# Patient Record
Sex: FEMALE | Race: WHITE | Employment: OTHER | URBAN - METROPOLITAN AREA
[De-identification: names, ages, dates, MRNs, and addresses within clinical notes are randomized per-mention and may not be internally consistent; named-entity substitution may affect disease eponyms.]

---

## 2016-05-19 LAB
CHOLESTEROL, TOTAL: NORMAL MG/DL
CHOLESTEROL/HDL RATIO: NORMAL
HDLC SERPL-MCNC: 57 MG/DL (ref 35–70)
LDL CHOLESTEROL CALCULATED: 67 MG/DL (ref 0–160)
TRIGL SERPL-MCNC: 103 MG/DL
VLDLC SERPL CALC-MCNC: NORMAL MG/DL

## 2017-02-27 RX ORDER — ATORVASTATIN CALCIUM 10 MG/1
10 TABLET, FILM COATED ORAL DAILY
Qty: 30 TABLET | Refills: 11 | Status: SHIPPED | OUTPATIENT
Start: 2017-02-27 | End: 2018-03-19 | Stop reason: SDUPTHER

## 2017-05-15 ENCOUNTER — OFFICE VISIT (OUTPATIENT)
Dept: CARDIOLOGY CLINIC | Age: 82
End: 2017-05-15

## 2017-05-15 VITALS
SYSTOLIC BLOOD PRESSURE: 126 MMHG | DIASTOLIC BLOOD PRESSURE: 70 MMHG | BODY MASS INDEX: 32.06 KG/M2 | WEIGHT: 169.8 LBS | HEIGHT: 61 IN | HEART RATE: 80 BPM

## 2017-05-15 DIAGNOSIS — I63.9 ACUTE CVA (CEREBROVASCULAR ACCIDENT) (HCC): ICD-10-CM

## 2017-05-15 DIAGNOSIS — E78.2 MIXED HYPERLIPIDEMIA: ICD-10-CM

## 2017-05-15 DIAGNOSIS — Z95.1 S/P CABG X 2: ICD-10-CM

## 2017-05-15 DIAGNOSIS — I10 ESSENTIAL HYPERTENSION: ICD-10-CM

## 2017-05-15 DIAGNOSIS — I71.21 ASCENDING AORTIC ANEURYSM: ICD-10-CM

## 2017-05-15 DIAGNOSIS — I25.810 CORONARY ARTERY DISEASE INVOLVING CORONARY BYPASS GRAFT OF NATIVE HEART WITHOUT ANGINA PECTORIS: Primary | ICD-10-CM

## 2017-05-15 DIAGNOSIS — I48.0 PAROXYSMAL ATRIAL FIBRILLATION (HCC): ICD-10-CM

## 2017-05-15 DIAGNOSIS — I38 VHD (VALVULAR HEART DISEASE): ICD-10-CM

## 2017-05-15 DIAGNOSIS — R47.01 EXPRESSIVE APHASIA: ICD-10-CM

## 2017-05-15 DIAGNOSIS — Z98.61 HISTORY OF PTCA: ICD-10-CM

## 2017-05-15 DIAGNOSIS — E66.09 NON MORBID OBESITY DUE TO EXCESS CALORIES: ICD-10-CM

## 2017-05-15 PROCEDURE — G8400 PT W/DXA NO RESULTS DOC: HCPCS | Performed by: INTERNAL MEDICINE

## 2017-05-15 PROCEDURE — G8598 ASA/ANTIPLAT THER USED: HCPCS | Performed by: INTERNAL MEDICINE

## 2017-05-15 PROCEDURE — G8427 DOCREV CUR MEDS BY ELIG CLIN: HCPCS | Performed by: INTERNAL MEDICINE

## 2017-05-15 PROCEDURE — 99214 OFFICE O/P EST MOD 30 MIN: CPT | Performed by: INTERNAL MEDICINE

## 2017-05-15 PROCEDURE — G8419 CALC BMI OUT NRM PARAM NOF/U: HCPCS | Performed by: INTERNAL MEDICINE

## 2017-05-15 PROCEDURE — 4040F PNEUMOC VAC/ADMIN/RCVD: CPT | Performed by: INTERNAL MEDICINE

## 2017-05-15 PROCEDURE — 1123F ACP DISCUSS/DSCN MKR DOCD: CPT | Performed by: INTERNAL MEDICINE

## 2017-05-15 PROCEDURE — 1036F TOBACCO NON-USER: CPT | Performed by: INTERNAL MEDICINE

## 2017-05-15 PROCEDURE — 1090F PRES/ABSN URINE INCON ASSESS: CPT | Performed by: INTERNAL MEDICINE

## 2017-05-26 LAB
ALBUMIN SERPL-MCNC: 3.4 G/DL
ALP BLD-CCNC: 127 U/L
ALT SERPL-CCNC: 15 U/L
ANION GAP SERPL CALCULATED.3IONS-SCNC: NORMAL MMOL/L
AST SERPL-CCNC: 17 U/L
BASOPHILS ABSOLUTE: 0.2 /ΜL
BASOPHILS RELATIVE PERCENT: 2.4 %
BILIRUB SERPL-MCNC: 0.4 MG/DL (ref 0.1–1.4)
BUN BLDV-MCNC: 19 MG/DL
CALCIUM SERPL-MCNC: 9.2 MG/DL
CHLORIDE BLD-SCNC: 99 MMOL/L
CHOLESTEROL, TOTAL: 139 MG/DL
CHOLESTEROL/HDL RATIO: NORMAL
CO2: 27 MMOL/L
CREAT SERPL-MCNC: 0.9 MG/DL
EOSINOPHILS ABSOLUTE: 0.2 /ΜL
EOSINOPHILS RELATIVE PERCENT: 3.5 %
GFR CALCULATED: NORMAL
GLUCOSE BLD-MCNC: 90 MG/DL
HCT VFR BLD CALC: 36.4 % (ref 36–46)
HDLC SERPL-MCNC: 59 MG/DL (ref 35–70)
HEMOGLOBIN: 12.2 G/DL (ref 12–16)
LDL CHOLESTEROL CALCULATED: 63 MG/DL (ref 0–160)
LYMPHOCYTES ABSOLUTE: 1.5 /ΜL
LYMPHOCYTES RELATIVE PERCENT: 26.4 %
MCH RBC QN AUTO: 31 PG
MCHC RBC AUTO-ENTMCNC: 33.6 G/DL
MCV RBC AUTO: 92.3 FL
MONOCYTES ABSOLUTE: 0.7 /ΜL
MONOCYTES RELATIVE PERCENT: 12.3 %
NEUTROPHILS ABSOLUTE: 3.2 /ΜL
NEUTROPHILS RELATIVE PERCENT: 55.4 %
PLATELET # BLD: 205 K/ΜL
PMV BLD AUTO: NORMAL FL
POTASSIUM SERPL-SCNC: 4.4 MMOL/L
RBC # BLD: 3.94 10^6/ΜL
SODIUM BLD-SCNC: 139 MMOL/L
TOTAL PROTEIN: 7.2
TRIGL SERPL-MCNC: 63 MG/DL
VLDLC SERPL CALC-MCNC: 17 MG/DL
WBC # BLD: 5.8 10^3/ML

## 2017-11-01 ENCOUNTER — OFFICE VISIT (OUTPATIENT)
Dept: CARDIOLOGY CLINIC | Age: 82
End: 2017-11-01

## 2017-11-01 VITALS
HEART RATE: 70 BPM | HEIGHT: 61 IN | WEIGHT: 171.2 LBS | BODY MASS INDEX: 32.32 KG/M2 | DIASTOLIC BLOOD PRESSURE: 66 MMHG | SYSTOLIC BLOOD PRESSURE: 118 MMHG

## 2017-11-01 DIAGNOSIS — I25.810 CORONARY ARTERY DISEASE INVOLVING CORONARY BYPASS GRAFT OF NATIVE HEART WITHOUT ANGINA PECTORIS: Primary | ICD-10-CM

## 2017-11-01 DIAGNOSIS — Z98.61 HISTORY OF PTCA: ICD-10-CM

## 2017-11-01 DIAGNOSIS — I71.21 ASCENDING AORTIC ANEURYSM: ICD-10-CM

## 2017-11-01 DIAGNOSIS — I38 VHD (VALVULAR HEART DISEASE): ICD-10-CM

## 2017-11-01 DIAGNOSIS — Z95.1 S/P CABG X 2: ICD-10-CM

## 2017-11-01 DIAGNOSIS — E66.09 NON MORBID OBESITY DUE TO EXCESS CALORIES: ICD-10-CM

## 2017-11-01 DIAGNOSIS — R47.01 EXPRESSIVE APHASIA: ICD-10-CM

## 2017-11-01 DIAGNOSIS — I10 ESSENTIAL HYPERTENSION: ICD-10-CM

## 2017-11-01 DIAGNOSIS — I48.0 PAROXYSMAL ATRIAL FIBRILLATION (HCC): ICD-10-CM

## 2017-11-01 DIAGNOSIS — I63.9 ACUTE CVA (CEREBROVASCULAR ACCIDENT) (HCC): ICD-10-CM

## 2017-11-01 DIAGNOSIS — E78.2 MIXED HYPERLIPIDEMIA: ICD-10-CM

## 2017-11-01 PROCEDURE — 1123F ACP DISCUSS/DSCN MKR DOCD: CPT | Performed by: INTERNAL MEDICINE

## 2017-11-01 PROCEDURE — G8598 ASA/ANTIPLAT THER USED: HCPCS | Performed by: INTERNAL MEDICINE

## 2017-11-01 PROCEDURE — G8427 DOCREV CUR MEDS BY ELIG CLIN: HCPCS | Performed by: INTERNAL MEDICINE

## 2017-11-01 PROCEDURE — 4040F PNEUMOC VAC/ADMIN/RCVD: CPT | Performed by: INTERNAL MEDICINE

## 2017-11-01 PROCEDURE — 99214 OFFICE O/P EST MOD 30 MIN: CPT | Performed by: INTERNAL MEDICINE

## 2017-11-01 PROCEDURE — G8417 CALC BMI ABV UP PARAM F/U: HCPCS | Performed by: INTERNAL MEDICINE

## 2017-11-01 PROCEDURE — 1036F TOBACCO NON-USER: CPT | Performed by: INTERNAL MEDICINE

## 2017-11-01 PROCEDURE — G8484 FLU IMMUNIZE NO ADMIN: HCPCS | Performed by: INTERNAL MEDICINE

## 2017-11-01 PROCEDURE — 1090F PRES/ABSN URINE INCON ASSESS: CPT | Performed by: INTERNAL MEDICINE

## 2017-11-01 PROCEDURE — G8400 PT W/DXA NO RESULTS DOC: HCPCS | Performed by: INTERNAL MEDICINE

## 2017-11-01 RX ORDER — CARVEDILOL 12.5 MG/1
12.5 TABLET ORAL 2 TIMES DAILY
Qty: 180 TABLET | Refills: 3 | Status: SHIPPED | OUTPATIENT
Start: 2017-11-01 | End: 2018-05-16 | Stop reason: SDUPTHER

## 2017-11-01 NOTE — PROGRESS NOTES
OFFICE PROGRESS NOTES      Deion Mendes is a 80 y.o. female who has    CHIEF COMPLAINT AS FOLLOWS:  CHEST PAIN: Patient denies any C/O chest pains at this time. SOB: No C/O SOB at this time. LEG EDEMA: No leg edema   PALPITATIONS: Denies any C/O Palpitations                                DIZZINESS: No C/O Dizziness                           SYNCOPE: None   OTHER:                                     HPI: Patient is here for F/U on her CAD, HTN & Dyslipidemia problems. She does not have any complaints at this time.     Omar Harris has the following history recorded in care path:  Patient Active Problem List    Diagnosis Date Noted    Unstable angina ([facility] 01/22/2016     Priority: High    [facility] 01/21/2016     Priority: Medium    Expressive aphasia 01/21/2016     Priority: Medium    Paroxysmal atrial fibrillation ([facility] 01/13/2016     Priority: Medium    S/P CABG x 2 08/03/2016     Priority: Low    Essential hypertension      Priority: Low    Non morbid obesity due to excess calories      Priority: Low    Coronary artery disease involving coronary bypass graft of native heart without angina pectoris      Priority: Low    Ascending aortic aneurysm (HCC)      Priority: Low    UTI (urinary tract infection) 01/13/2016     Priority: Low    H/O cardiovascular stress test 08/13/2013     Priority: Low    VHD (valvular heart disease)      Priority: Low    History of PTCA      Priority: Low    Hyperlipidemia      Priority: Low     Current Outpatient Prescriptions   Medication Sig Dispense Refill    apixaban (ELIQUIS) 5 MG TABS tablet Take 1 tablet by mouth 2 times daily 60 tablet 11    atorvastatin (LIPITOR) 10 MG tablet Take 1 tablet by mouth daily 30 tablet 11    carvedilol (COREG) 12.5 MG tablet Take 1 tablet by mouth 2 times daily 180 tablet 3    acetaminophen (TYLENOL) 500 MG tablet Take 500 Never Smoker    Smokeless tobacco: Never Used      Comment: reviewed 9/21/2016    Alcohol use No      Review of Systems:    Constitutional: Negative for diaphoresis and fatigue  Psychological:Negative for anxiety or depression  HENT: Negative for headaches, nasal congestion, sinus pain or vertigo  Eyes: Negative for visual disturbance. Endocrine: Negative for polydipsia/polyuria  Respiratory: Negative for shortness of breath  Cardiovascular: Negative for chest pain, dyspnea on exertion, claudication, edema, irregular heartbeat, murmur, palpitations or shortness of breath  Gastrointestinal: Negative for abdominal pain or heartburn  Genito-Urinary: Negative for urinary frequency/urgency  Musculoskeletal: Negative for muscle pain, muscular weakness, negative for pain in arm and leg or swelling in foot and leg  Neurological: Negative for dizziness, headaches, memory loss, numbness/tingling, visual changes, syncope  Dermatological: Negative for rash    Objective: There were no vitals taken for this visit. Wt Readings from Last 3 Encounters:   05/15/17 169 lb 12.8 oz (77 kg)   11/23/16 166 lb (75.3 kg)   09/21/16 169 lb (76.7 kg)     There is no height or weight on file to calculate BMI. GENERAL - Alert, oriented, pleasant, in no apparent distress. EYES: No jaundice, no conjunctival pallor. SKIN: It is warm & dry. No rashes. No Echhymosis    HEENT  No clinically significant abnormalities seen. Neck - Supple. No jugular venous distention noted. No carotid bruits. Cardiovascular  Normal S1 and S2 without obvious murmur or gallop. Extremities - No cyanosis, clubbing, or significant edema. Pulmonary  No respiratory distress. No wheezes or rales. Abdomen  No masses, tenderness, or organomegaly. Musculoskeletal  No significant edema. No joint deformities. No muscle wasting. Neurologic  Cranial nerves II through XII are grossly intact. There were no gross focal neurologic abnormalities.     Lab heart disease) I39    H/O cardiovascular stress test Z92.89    Paroxysmal atrial fibrillation (HCC) I48.0    UTI (urinary tract infection) N39.0    Acute CVA (cerebrovascular accident) ([facility] I63.9    Expressive aphasia R47.01    Unstable angina (HCC) I20.0    Coronary artery disease involving coronary bypass graft of native heart without angina pectoris I25.810    Ascending aortic aneurysm (HCC) I71.2    Essential hypertension I10    Non morbid obesity due to excess calories E66.09    S/P CABG x 2 Z95.1       Assessment & Plan:    CAD:Yes   clinically stable. Patient is on optimal medical regimen ( see medication list above )  -     CORONARY ARTERY DISEASE: Patient is currently  asymptomatic from CAD. - changes in  treatment:   no           - Testing ordered:  no  [facility] classification: 1  [place] RISK SCORE:  [place] RISK SCORE:  HTN:well controlled on current medical regimen, see list above. - changes in  treatment:   no     VHD: No significant VHD noted  DYSLIPIDEMIA: Patient's profile is at / near [employer],                                                             Tolerating current medical regimen wellyes,                                                               See most recent Lab values in Labs section above. OTHER RELEVANT DIAGNOSIS:as noted in patient's active problem list:  TESTS ORDERED:  None this visit                                      All previously ordered tests reviewed. ARRHYTHMIAS: Patient has H/O Atrial fibrillation                                She is rate controlled & on anticoagulation. MEDICATIONS: CPM   Office f/u in six months. Primary/secondary prevention is the goal by aggressive risk modification, healthy and therapeutic life style changes for cardiovascular risk reduction. Various goals are discussed and multiple questions answered.

## 2017-11-01 NOTE — LETTER
Cardiology [address] [place] [address]  Phone: [phone]  Fax: [phone]    Sharon Stiles MD        November 1, 2017     Dewayne Rodriguez MD  [address]    Patient: Keyvn Alfaro  MR Number: [id number]  YOB: 1933  Date of Visit: 11/1/2017    Dear Dr. Dewayne Rodriguez: Thank you for the request for consultation for [facility] to me for the evaluation of CAD / A-fib. Below are the relevant portions of my assessment and plan of care. If you have questions, please do not hesitate to call me. I look forward to following Corinna Ramachandran along with you.     Sincerely,        Sharon Stiles MD

## 2017-11-29 ENCOUNTER — HOSPITAL ENCOUNTER (OUTPATIENT)
Dept: WOMENS IMAGING | Age: 82
Discharge: HOME | End: 2017-11-29
Attending: FAMILY MEDICINE | Admitting: FAMILY MEDICINE

## 2017-11-29 DIAGNOSIS — Z12.31 SCREENING MAMMOGRAM, ENCOUNTER FOR: ICD-10-CM

## 2017-11-29 DIAGNOSIS — M81.0 OSTEOPOROSIS WITHOUT CURRENT PATHOLOGICAL FRACTURE, UNSPECIFIED OSTEOPOROSIS TYPE: ICD-10-CM

## 2018-03-20 RX ORDER — ATORVASTATIN CALCIUM 10 MG/1
10 TABLET, FILM COATED ORAL DAILY
Qty: 30 TABLET | Refills: 1 | Status: SHIPPED | OUTPATIENT
Start: 2018-03-20 | End: 2018-05-16 | Stop reason: SDUPTHER

## 2018-03-21 LAB
ALBUMIN SERPL-MCNC: 4 G/DL
ALP BLD-CCNC: 111 U/L
ALT SERPL-CCNC: 13 U/L
ANION GAP SERPL CALCULATED.3IONS-SCNC: NORMAL MMOL/L
AST SERPL-CCNC: 18 U/L
BILIRUB SERPL-MCNC: 0.3 MG/DL (ref 0.1–1.4)
BUN BLDV-MCNC: 20 MG/DL
CALCIUM SERPL-MCNC: 10 MG/DL
CHLORIDE BLD-SCNC: 99 MMOL/L
CHOLESTEROL, TOTAL: 153 MG/DL
CHOLESTEROL/HDL RATIO: ABNORMAL
CO2: 28 MMOL/L
CREAT SERPL-MCNC: 1 MG/DL
GFR CALCULATED: NORMAL
GLUCOSE BLD-MCNC: 76 MG/DL
HDLC SERPL-MCNC: 71 MG/DL (ref 35–70)
LDL CHOLESTEROL CALCULATED: 65 MG/DL (ref 0–160)
POTASSIUM SERPL-SCNC: 4.3 MMOL/L
SODIUM BLD-SCNC: 140 MMOL/L
TOTAL PROTEIN: 7.3
TRIGL SERPL-MCNC: 87 MG/DL
TSH SERPL DL<=0.05 MIU/L-ACNC: 2.48 UIU/ML
VLDLC SERPL CALC-MCNC: 17 MG/DL

## 2018-05-16 ENCOUNTER — OFFICE VISIT (OUTPATIENT)
Dept: CARDIOLOGY CLINIC | Age: 83
End: 2018-05-16

## 2018-05-16 VITALS
WEIGHT: 171.8 LBS | DIASTOLIC BLOOD PRESSURE: 70 MMHG | SYSTOLIC BLOOD PRESSURE: 130 MMHG | HEIGHT: 60 IN | BODY MASS INDEX: 33.73 KG/M2 | HEART RATE: 60 BPM

## 2018-05-16 DIAGNOSIS — I48.0 PAROXYSMAL ATRIAL FIBRILLATION (HCC): ICD-10-CM

## 2018-05-16 DIAGNOSIS — E78.2 MIXED HYPERLIPIDEMIA: ICD-10-CM

## 2018-05-16 DIAGNOSIS — I71.21 ASCENDING AORTIC ANEURYSM: ICD-10-CM

## 2018-05-16 DIAGNOSIS — E66.09 NON MORBID OBESITY DUE TO EXCESS CALORIES: ICD-10-CM

## 2018-05-16 DIAGNOSIS — I63.9 ACUTE CVA (CEREBROVASCULAR ACCIDENT) (HCC): ICD-10-CM

## 2018-05-16 DIAGNOSIS — R47.01 EXPRESSIVE APHASIA: ICD-10-CM

## 2018-05-16 DIAGNOSIS — Z95.1 S/P CABG X 2: ICD-10-CM

## 2018-05-16 DIAGNOSIS — I10 ESSENTIAL HYPERTENSION: ICD-10-CM

## 2018-05-16 DIAGNOSIS — Z98.61 HISTORY OF PTCA: ICD-10-CM

## 2018-05-16 DIAGNOSIS — I25.810 CORONARY ARTERY DISEASE INVOLVING CORONARY BYPASS GRAFT OF NATIVE HEART WITHOUT ANGINA PECTORIS: Primary | ICD-10-CM

## 2018-05-16 DIAGNOSIS — I38 VHD (VALVULAR HEART DISEASE): ICD-10-CM

## 2018-05-16 PROCEDURE — G8427 DOCREV CUR MEDS BY ELIG CLIN: HCPCS | Performed by: INTERNAL MEDICINE

## 2018-05-16 PROCEDURE — 99214 OFFICE O/P EST MOD 30 MIN: CPT | Performed by: INTERNAL MEDICINE

## 2018-05-16 PROCEDURE — G8417 CALC BMI ABV UP PARAM F/U: HCPCS | Performed by: INTERNAL MEDICINE

## 2018-05-16 PROCEDURE — 1123F ACP DISCUSS/DSCN MKR DOCD: CPT | Performed by: INTERNAL MEDICINE

## 2018-05-16 PROCEDURE — G8598 ASA/ANTIPLAT THER USED: HCPCS | Performed by: INTERNAL MEDICINE

## 2018-05-16 PROCEDURE — 4040F PNEUMOC VAC/ADMIN/RCVD: CPT | Performed by: INTERNAL MEDICINE

## 2018-05-16 PROCEDURE — 1090F PRES/ABSN URINE INCON ASSESS: CPT | Performed by: INTERNAL MEDICINE

## 2018-05-16 PROCEDURE — G8399 PT W/DXA RESULTS DOCUMENT: HCPCS | Performed by: INTERNAL MEDICINE

## 2018-05-16 PROCEDURE — 1036F TOBACCO NON-USER: CPT | Performed by: INTERNAL MEDICINE

## 2018-05-16 RX ORDER — ATORVASTATIN CALCIUM 10 MG/1
10 TABLET, FILM COATED ORAL DAILY
Qty: 30 TABLET | Refills: 1 | Status: SHIPPED | OUTPATIENT
Start: 2018-05-16 | End: 2018-07-18 | Stop reason: SDUPTHER

## 2018-05-16 RX ORDER — LEVOTHYROXINE SODIUM 0.03 MG/1
25 TABLET ORAL
COMMUNITY
End: 2019-09-19 | Stop reason: SDUPTHER

## 2018-05-16 RX ORDER — CARVEDILOL 12.5 MG/1
12.5 TABLET ORAL 2 TIMES DAILY
Qty: 180 TABLET | Refills: 3 | Status: SHIPPED | OUTPATIENT
Start: 2018-05-16 | End: 2019-06-05 | Stop reason: DRUGHIGH

## 2018-07-19 RX ORDER — ATORVASTATIN CALCIUM 10 MG/1
10 TABLET, FILM COATED ORAL DAILY
Qty: 30 TABLET | Refills: 5 | Status: SHIPPED | OUTPATIENT
Start: 2018-07-19 | End: 2019-01-02 | Stop reason: SDUPTHER

## 2018-11-20 ENCOUNTER — OFFICE VISIT (OUTPATIENT)
Dept: CARDIOLOGY CLINIC | Age: 83
End: 2018-11-20
Payer: MEDICARE

## 2018-11-20 VITALS
HEIGHT: 60 IN | DIASTOLIC BLOOD PRESSURE: 72 MMHG | SYSTOLIC BLOOD PRESSURE: 136 MMHG | HEART RATE: 74 BPM | BODY MASS INDEX: 32.98 KG/M2 | WEIGHT: 168 LBS

## 2018-11-20 DIAGNOSIS — I71.21 ASCENDING AORTIC ANEURYSM: ICD-10-CM

## 2018-11-20 DIAGNOSIS — I25.810 CORONARY ARTERY DISEASE INVOLVING CORONARY BYPASS GRAFT OF NATIVE HEART WITHOUT ANGINA PECTORIS: Primary | ICD-10-CM

## 2018-11-20 DIAGNOSIS — I10 ESSENTIAL HYPERTENSION: ICD-10-CM

## 2018-11-20 DIAGNOSIS — Z98.61 HISTORY OF PTCA: ICD-10-CM

## 2018-11-20 DIAGNOSIS — I48.0 PAROXYSMAL ATRIAL FIBRILLATION (HCC): ICD-10-CM

## 2018-11-20 DIAGNOSIS — I38 VHD (VALVULAR HEART DISEASE): ICD-10-CM

## 2018-11-20 DIAGNOSIS — E66.09 NON MORBID OBESITY DUE TO EXCESS CALORIES: ICD-10-CM

## 2018-11-20 DIAGNOSIS — E78.2 MIXED HYPERLIPIDEMIA: ICD-10-CM

## 2018-11-20 DIAGNOSIS — R47.01 EXPRESSIVE APHASIA: ICD-10-CM

## 2018-11-20 DIAGNOSIS — Z95.1 S/P CABG X 2: ICD-10-CM

## 2018-11-20 PROCEDURE — 1090F PRES/ABSN URINE INCON ASSESS: CPT | Performed by: INTERNAL MEDICINE

## 2018-11-20 PROCEDURE — 1036F TOBACCO NON-USER: CPT | Performed by: INTERNAL MEDICINE

## 2018-11-20 PROCEDURE — G8417 CALC BMI ABV UP PARAM F/U: HCPCS | Performed by: INTERNAL MEDICINE

## 2018-11-20 PROCEDURE — G8399 PT W/DXA RESULTS DOCUMENT: HCPCS | Performed by: INTERNAL MEDICINE

## 2018-11-20 PROCEDURE — G8484 FLU IMMUNIZE NO ADMIN: HCPCS | Performed by: INTERNAL MEDICINE

## 2018-11-20 PROCEDURE — 1123F ACP DISCUSS/DSCN MKR DOCD: CPT | Performed by: INTERNAL MEDICINE

## 2018-11-20 PROCEDURE — G8598 ASA/ANTIPLAT THER USED: HCPCS | Performed by: INTERNAL MEDICINE

## 2018-11-20 PROCEDURE — G8427 DOCREV CUR MEDS BY ELIG CLIN: HCPCS | Performed by: INTERNAL MEDICINE

## 2018-11-20 PROCEDURE — 99214 OFFICE O/P EST MOD 30 MIN: CPT | Performed by: INTERNAL MEDICINE

## 2018-11-20 PROCEDURE — 1101F PT FALLS ASSESS-DOCD LE1/YR: CPT | Performed by: INTERNAL MEDICINE

## 2018-11-20 PROCEDURE — 4040F PNEUMOC VAC/ADMIN/RCVD: CPT | Performed by: INTERNAL MEDICINE

## 2018-11-20 NOTE — PROGRESS NOTES
OFFICE PROGRESS NOTES      Yates Duane is a 80 y.o. female who has    CHIEF COMPLAINT AS FOLLOWS:  CHEST PAIN: Patient denies any C/O chest pains at this time. SOB: No C/O SOB at this time. LEG EDEMA: No leg edema   PALPITATIONS: Denies any C/O Palpitations                                   DIZZINESS: No C/O Dizziness                           SYNCOPE: None   OTHER:                                     HPI: Patient is here for F/U on her CAD, HTN & Dyslipidemia problems. She does not have any complaints at this time.     Gareth Sanabria has the following history recorded in care path:  Patient Active Problem List    Diagnosis Date Noted    Unstable angina ([facility] [facility] 01/22/2016     Priority: High    [facility] 01/21/2016     Priority: Medium    Expressive aphasia 01/21/2016     Priority: Medium    Paroxysmal atrial fibrillation ([facility] 01/13/2016     Priority: Medium    S/P CABG x 2 08/03/2016     Priority: Low    Essential hypertension      Priority: Low    Non morbid obesity due to excess calories      Priority: Low    Coronary artery disease involving coronary bypass graft of native heart without angina pectoris      Priority: Low    Ascending aortic aneurysm (HCC)      Priority: Low    H/O cardiovascular stress test 08/13/2013     Priority: Low    VHD (valvular heart disease)      Priority: Low    History of PTCA      Priority: Low    Hyperlipidemia      Priority: Low     Current Outpatient Prescriptions   Medication Sig Dispense Refill    atorvastatin (LIPITOR) 10 MG tablet Take 1 tablet by mouth daily 30 tablet 5    apixaban (ELIQUIS) 5 MG TABS tablet Take 1 tablet by mouth 2 times daily 60 tablet 5    levothyroxine (SYNTHROID) 25 MCG tablet Take 25 mcg by mouth Daily      carvedilol (COREG) 12.5 MG tablet Take 1 tablet by mouth 2 times daily (Patient taking differently: Take 12.5 mg by reviewed History reviewed. No pertinent family history. Social History   Substance Use Topics    Smoking status: Never Smoker    Smokeless tobacco: Never Used    Alcohol use No      Review of Systems:    Constitutional: Negative for diaphoresis and fatigue  Psychological:Negative for anxiety or depression  HENT: Negative for headaches, nasal congestion, sinus pain or vertigo  Eyes: Negative for visual disturbance. Endocrine: Negative for polydipsia/polyuria  Respiratory: Negative for shortness of breath  Cardiovascular: Negative for chest pain, dyspnea on exertion, claudication, edema, irregular heartbeat, murmur, palpitations or shortness of breath  Gastrointestinal: Negative for abdominal pain or heartburn  Genito-Urinary: Negative for urinary frequency/urgency  Musculoskeletal: Negative for muscle pain, muscular weakness, negative for pain in arm and leg or swelling in foot and leg  Neurological: Negative for dizziness, headaches, memory loss, numbness/tingling, visual changes, syncope  Dermatological: Negative for rash    Objective:  /72   Pulse 74   Ht 4' 11.5\" (1.511 m)   Wt 168 lb (76.2 kg)   BMI 33.36 kg/m²   Wt Readings from Last 3 Encounters:   11/20/18 168 lb (76.2 kg)   05/16/18 171 lb 12.8 oz (77.9 kg)   11/01/17 171 lb 3.2 oz (77.7 kg)     Body mass index is 33.36 kg/m². GENERAL - Alert, oriented, pleasant, in no apparent distress. EYES: No jaundice, no conjunctival pallor. SKIN: It is warm & dry. No rashes. No Echhymosis    HEENT - No clinically significant abnormalities seen. Neck - Supple. No jugular venous distention noted. No carotid bruits. Cardiovascular - Normal S1 and S2 without obvious murmur or gallop. Extremities - No cyanosis, clubbing, or significant edema. Pulmonary - No respiratory distress. No wheezes or rales. Abdomen - No masses, tenderness, or organomegaly. Musculoskeletal - No significant edema. No joint deformities. No muscle wasting.   Neurologic anticoagulation. MEDICATIONS: CPM   Office f/u in six months. Primary/secondary prevention is the goal by aggressive risk modification, healthy and therapeutic life style changes for cardiovascular risk reduction. Various goals are discussed and multiple questions answered.

## 2018-12-05 ENCOUNTER — PROCEDURE VISIT (OUTPATIENT)
Dept: CARDIOLOGY CLINIC | Age: 83
End: 2018-12-05
Payer: MEDICARE

## 2018-12-05 DIAGNOSIS — I10 ESSENTIAL HYPERTENSION: ICD-10-CM

## 2018-12-05 DIAGNOSIS — R47.01 EXPRESSIVE APHASIA: ICD-10-CM

## 2018-12-05 DIAGNOSIS — I25.810 CORONARY ARTERY DISEASE INVOLVING CORONARY BYPASS GRAFT OF NATIVE HEART WITHOUT ANGINA PECTORIS: ICD-10-CM

## 2018-12-05 DIAGNOSIS — Z95.1 S/P CABG X 2: ICD-10-CM

## 2018-12-05 DIAGNOSIS — E78.2 MIXED HYPERLIPIDEMIA: ICD-10-CM

## 2018-12-05 DIAGNOSIS — I38 VHD (VALVULAR HEART DISEASE): ICD-10-CM

## 2018-12-05 DIAGNOSIS — I48.0 PAROXYSMAL ATRIAL FIBRILLATION (HCC): ICD-10-CM

## 2018-12-05 DIAGNOSIS — E66.09 NON MORBID OBESITY DUE TO EXCESS CALORIES: ICD-10-CM

## 2018-12-05 DIAGNOSIS — Z98.61 HISTORY OF PTCA: ICD-10-CM

## 2018-12-05 DIAGNOSIS — I71.21 ASCENDING AORTIC ANEURYSM: ICD-10-CM

## 2018-12-05 PROCEDURE — 93880 EXTRACRANIAL BILAT STUDY: CPT | Performed by: INTERNAL MEDICINE

## 2018-12-06 ENCOUNTER — TELEPHONE (OUTPATIENT)
Dept: CARDIOLOGY CLINIC | Age: 83
End: 2018-12-06

## 2018-12-06 NOTE — TELEPHONE ENCOUNTER
Left message on voicemail per hipaa. Mild (0-49%) disease of the Bilateral proximal Internal carotid artery. The Left Proximal external carotid artery exhibits stenosis. Normal vertebral flow.

## 2019-01-02 RX ORDER — ATORVASTATIN CALCIUM 10 MG/1
10 TABLET, FILM COATED ORAL DAILY
Qty: 30 TABLET | Refills: 5 | Status: SHIPPED | OUTPATIENT
Start: 2019-01-02 | End: 2019-07-31 | Stop reason: SDUPTHER

## 2019-01-04 ENCOUNTER — TELEPHONE (OUTPATIENT)
Dept: CARDIOLOGY CLINIC | Age: 84
End: 2019-01-04

## 2019-05-13 NOTE — TELEPHONE ENCOUNTER
Kathyleen Solon called needing the following prescription refills:   Requested Prescriptions     Pending Prescriptions Disp Refills    ADVAIR DISKUS 250-50 MCG/DOSE AEPB [Pharmacy Med Name: Ibeth Alcaraz 250-50 [place] 1 Inhaler 4     Sig: TAKE 1 PUFF BY MOUTH EVERY DAY       Medications were reviewed and appropriate refills were sent to the requested pharmacy after provider approval.     Electronically signed by Davide Ariza LPN on 5/76/80 at 82:71 AM        Patient has appt on 7/31/19

## 2019-06-05 ENCOUNTER — OFFICE VISIT (OUTPATIENT)
Dept: CARDIOLOGY CLINIC | Age: 84
End: 2019-06-05
Payer: MEDICARE

## 2019-06-05 ENCOUNTER — PROCEDURE VISIT (OUTPATIENT)
Dept: CARDIOLOGY CLINIC | Age: 84
End: 2019-06-05
Payer: MEDICARE

## 2019-06-05 VITALS
WEIGHT: 169 LBS | DIASTOLIC BLOOD PRESSURE: 70 MMHG | HEIGHT: 59 IN | HEART RATE: 60 BPM | SYSTOLIC BLOOD PRESSURE: 118 MMHG | BODY MASS INDEX: 34.07 KG/M2

## 2019-06-05 DIAGNOSIS — I71.21 ASCENDING AORTIC ANEURYSM: ICD-10-CM

## 2019-06-05 DIAGNOSIS — I25.810 CORONARY ARTERY DISEASE INVOLVING CORONARY BYPASS GRAFT OF NATIVE HEART WITHOUT ANGINA PECTORIS: ICD-10-CM

## 2019-06-05 DIAGNOSIS — I38 VHD (VALVULAR HEART DISEASE): Primary | ICD-10-CM

## 2019-06-05 DIAGNOSIS — E78.2 MIXED HYPERLIPIDEMIA: ICD-10-CM

## 2019-06-05 DIAGNOSIS — I38 VHD (VALVULAR HEART DISEASE): ICD-10-CM

## 2019-06-05 DIAGNOSIS — I10 ESSENTIAL HYPERTENSION: ICD-10-CM

## 2019-06-05 DIAGNOSIS — I48.0 PAROXYSMAL ATRIAL FIBRILLATION (HCC): Primary | ICD-10-CM

## 2019-06-05 LAB
LV EF: 58 %
LVEF MODALITY: NORMAL

## 2019-06-05 PROCEDURE — G8427 DOCREV CUR MEDS BY ELIG CLIN: HCPCS | Performed by: NURSE PRACTITIONER

## 2019-06-05 PROCEDURE — 1123F ACP DISCUSS/DSCN MKR DOCD: CPT | Performed by: NURSE PRACTITIONER

## 2019-06-05 PROCEDURE — 4040F PNEUMOC VAC/ADMIN/RCVD: CPT | Performed by: NURSE PRACTITIONER

## 2019-06-05 PROCEDURE — 1090F PRES/ABSN URINE INCON ASSESS: CPT | Performed by: NURSE PRACTITIONER

## 2019-06-05 PROCEDURE — 99213 OFFICE O/P EST LOW 20 MIN: CPT | Performed by: NURSE PRACTITIONER

## 2019-06-05 PROCEDURE — G8598 ASA/ANTIPLAT THER USED: HCPCS | Performed by: NURSE PRACTITIONER

## 2019-06-05 PROCEDURE — G8417 CALC BMI ABV UP PARAM F/U: HCPCS | Performed by: NURSE PRACTITIONER

## 2019-06-05 PROCEDURE — 1036F TOBACCO NON-USER: CPT | Performed by: NURSE PRACTITIONER

## 2019-06-05 PROCEDURE — 93306 TTE W/DOPPLER COMPLETE: CPT | Performed by: INTERNAL MEDICINE

## 2019-06-05 RX ORDER — CARVEDILOL 6.25 MG/1
6.25 TABLET ORAL 2 TIMES DAILY WITH MEALS
COMMUNITY
End: 2019-07-31 | Stop reason: SDUPTHER

## 2019-06-05 NOTE — PATIENT INSTRUCTIONS
Please remember to bring all medication bottles or a medication list with you to your appointment. If you have any questions, please call our office at [phone].

## 2019-06-05 NOTE — PROGRESS NOTES
[facility] [place]  Brianna Russell  Phone: [phone]    Fax [phone]                  Deuce Moreno MD, Paul Jha MD, [address], MD, MD Lisa Mascorro MD Mela Fairy, MD Julianne Orn, APRN      Ivette Mcdonald, APRGABO Delgado, OSIRIS    CARDIOLOGY  NOTE      6/5/2019    RE: Larry Guerrero  (4/17/1933)                               TO:  Dr. Shelley Espinoza MD  The primary cardiologist is Dr. Jose Drake    CC:   1. Shortness of breath    2. Paroxysmal atrial fibrillation (HCC)    3. VHD (valvular heart disease)    4. Coronary artery disease involving coronary bypass graft of native heart without angina pectoris    5. Ascending aortic aneurysm ([address]    6. Essential hypertension    7. Mixed hyperlipidemia      Patient denies all of the following:  Chest Pain  Palpitations  Increased Shortness of Breath  Increased Edema  Dizziness  Syncope      HPI: Thank you for involving me in taking care of your patient Larry Guerrero, who is a  80y.o. year old female with a history as listed above. Patient is not active she lives home alone but has frequent help. Patient is compliant with medications. Patient denies any cardiac complaints or needs.      Vitals:    06/05/19 1052   BP: 118/70   Pulse: 60       Current Outpatient Medications   Medication Sig Dispense Refill    carvedilol (COREG) 6.25 MG tablet Take 6.25 mg by mouth 2 times daily (with meals)      ADVAIR DISKUS 250-50 MCG/DOSE AEPB TAKE 1 PUFF BY MOUTH EVERY DAY 1 Inhaler 4    apixaban (ELIQUIS) 5 MG TABS tablet Take 1 tablet by mouth 2 times daily 56 tablet 0    atorvastatin (LIPITOR) 10 MG tablet Take 1 tablet by mouth daily 30 tablet 5    levothyroxine (SYNTHROID) 25 MCG tablet Take 25 mcg by mouth every morning (before breakfast)       acetaminophen (TYLENOL) 500 MG tablet Take 500 mg by mouth every 6 hours as needed for Pain      aspirin 81 MG tablet Take 81 mg by mouth daily      ranitidine (ZANTAC) 150 MG tablet Take 150 mg by mouth 2 times daily       multivitamin (THERAGRAN) per tablet Take 1 tablet by mouth daily.  albuterol (PROVENTIL;VENTOLIN) 90 MCG/ACT inhaler Inhale 2 puffs into the lungs every 6 hours as needed.  nitroGLYCERIN (NITROSTAT) 0.4 MG SL tablet Place 1 tablet under the tongue every 5 minutes as needed for Chest pain 25 tablet 3     No current facility-administered medications for this visit. Allergies: Sulfa antibiotics and Zoloft [sertraline hcl]  Past Medical History:   Diagnosis Date    A-fib [facility])     Ascending aortic aneurysm ([facility] [address] [address] 07/15/2016    per 7/15/2016 Op Note from Dr Zurdo Cee.  CAD (coronary artery disease)     Family history of coronary artery disease     H/O cardiovascular stress test 08/13/2013    EF 70%, no ischemia, normal perfusion pattern in all regions, normal study.  History of PTCA 1995,10/7/05,10/25/05    LAD,RCAX2 Ramus    Hx of cardiovascular stress test     11/12/2009=normal perfusion EF 70%, pt c/o 6/10 chest pain during adenosine infusion;  03/2008=EF 65%; 02/2006=EF 70%; 05/2005; 08/2001;10/1997; 09/1996    Hx of echocardiogram 8/13/13,11/12/09,05/2006,08/2001,10/1997    8/13-EF 50-55% essentially normal study.  11/09=EF>55%, mild MR    Hyperlipidemia     Hypertension     NSTEMI (non-ST elevated myocardial infarction) ([facility] 07/07/2016    Obesity     S/P CABG x 2 07/15/2016    LIMA-LAD and SVG-OM    VHD (valvular heart disease)      Past Surgical History:   Procedure Laterality Date    CHOLECYSTECTOMY      CORONARY ANGIOPLASTY      10/25/2005=ramus 2.5x15mm taxus and 2.5x8mm ostium and proximal portion of the ramus; PTCA LAD in [address] Left 08/15/2016    525 ml s/s     Family History   Problem Relation Age of Onset    Heart Disease Father     Heart Disease Sister     Heart Surgery Sister      Social History     Tobacco Use    Smoking status: Never Smoker    Smokeless tobacco: Never Used   Substance Use Topics    Alcohol use: No     Alcohol/week: 0.0 oz        Review of Systems - History obtained from the patient  General: negative for - fatigue, malaise, night sweats, weight gain  Psychological: negative for - anxiety, depression, sleep disturbances  Ophthalmic: negative for - blurry vision, loss of vision  ENT: negative for - headaches, vertigo, visual changes  Hematological and Lymphatic: negative for - bleeding problems, blood clots, bruising, fatigue or pallor  Endocrine: negative for - malaise/lethargy, palpitations, unexpected weight changes  Respiratory: negative for - cough, orthopnea, shortness of breath or tachypnea  Cardiovascular: negative for - chest pain, dyspnea on exertion, edema or palpitations  Gastrointestinal: no abdominal pain, change in bowel habits, or black or bloody stools  Genito-Urinary: no dysuria, trouble voiding, or hematuria  Musculoskeletal: negative for - gait disturbance, +generalized arthialgia, - swelling   Neurological: negative for - confusion, dizziness, impaired coordination/balance or numbness/tingling. Using a walker for ambulation    Objective:      Physical Exam:  /70 (Site: Right Upper Arm, Position: Sitting, Cuff Size: Medium Adult)   Pulse 60   Ht 4' 11\" (1.499 m)   Wt 169 lb (76.7 kg)   BMI 34.13 kg/m²   Wt Readings from Last 3 Encounters:   06/05/19 169 lb (76.7 kg)   11/20/18 168 lb (76.2 kg)   05/16/18 171 lb 12.8 oz (77.9 kg)     Body mass index is 34.13 kg/m². GENERAL - Alert, oriented, pleasant, in no apparent distress. Head unremarkable  Eyes - pupils equal and reactive to light - bilateral conjunctiva are pink: sclera are white   ENT - external ears intact, nose is intact:  tongue is midline pink and moist  Neck - Supple. No jugular venous distention noted. No carotid bruits appreciated. Cardiovascular - Normal S1 and S2:  murmur appreciated No, No gallop.  Regular rate-

## 2019-06-10 ENCOUNTER — TELEPHONE (OUTPATIENT)
Dept: CARDIOLOGY CLINIC | Age: 84
End: 2019-06-10

## 2019-06-10 NOTE — TELEPHONE ENCOUNTER
Advised patient of echo results. Patient voiced understanding. Summary   Left ventricular systolic function is normal with an ejection fraction of   55-60%.  Grade I diastolic dysfunction.   Mild septal wall asymmetrical left ventricular hypertrophy.   The left atrium is mildly dilated.   Dilatation of the ascending aorta measuring 4.3 cm.   Sclerotic, but non-stenotic aortic valve.   Mitral annular calcification is present.   Mild aortic regurgitation is noted with a pressure half time of 542 msec.   No other significant valvulopathy seen.   No evidence of any pericardial effusion.

## 2019-07-13 DIAGNOSIS — J98.4 RESTRICTIVE LUNG DISEASE: ICD-10-CM

## 2019-07-13 DIAGNOSIS — Z78.0 POSTMENOPAUSAL STATE: ICD-10-CM

## 2019-07-13 DIAGNOSIS — J98.01 BRONCHOSPASM: ICD-10-CM

## 2019-07-31 ENCOUNTER — OFFICE VISIT (OUTPATIENT)
Dept: FAMILY MEDICINE CLINIC | Age: 84
End: 2019-07-31
Payer: MEDICARE

## 2019-07-31 VITALS
OXYGEN SATURATION: 97 % | HEART RATE: 63 BPM | HEIGHT: 58 IN | SYSTOLIC BLOOD PRESSURE: 132 MMHG | BODY MASS INDEX: 35.48 KG/M2 | WEIGHT: 169 LBS | DIASTOLIC BLOOD PRESSURE: 84 MMHG

## 2019-07-31 DIAGNOSIS — I25.810 CORONARY ARTERY DISEASE INVOLVING CORONARY BYPASS GRAFT OF NATIVE HEART WITHOUT ANGINA PECTORIS: Primary | ICD-10-CM

## 2019-07-31 DIAGNOSIS — I10 ESSENTIAL HYPERTENSION: ICD-10-CM

## 2019-07-31 DIAGNOSIS — K21.9 GASTROESOPHAGEAL REFLUX DISEASE WITHOUT ESOPHAGITIS: ICD-10-CM

## 2019-07-31 DIAGNOSIS — E03.9 ACQUIRED HYPOTHYROIDISM: ICD-10-CM

## 2019-07-31 PROCEDURE — 99214 OFFICE O/P EST MOD 30 MIN: CPT | Performed by: FAMILY MEDICINE

## 2019-07-31 RX ORDER — CARVEDILOL 6.25 MG/1
6.25 TABLET ORAL 2 TIMES DAILY WITH MEALS
Qty: 180 TABLET | Refills: 1 | Status: SHIPPED | OUTPATIENT
Start: 2019-07-31 | End: 2019-10-07 | Stop reason: SDUPTHER

## 2019-07-31 RX ORDER — RANITIDINE 150 MG/1
150 TABLET ORAL 2 TIMES DAILY
Qty: 180 TABLET | Refills: 1 | Status: SHIPPED | OUTPATIENT
Start: 2019-07-31 | End: 2019-10-07 | Stop reason: SDUPTHER

## 2019-07-31 RX ORDER — ATORVASTATIN CALCIUM 10 MG/1
10 TABLET, FILM COATED ORAL DAILY
Qty: 90 TABLET | Refills: 1 | Status: SHIPPED | OUTPATIENT
Start: 2019-07-31 | End: 2019-08-16 | Stop reason: SDUPTHER

## 2019-07-31 ASSESSMENT — ENCOUNTER SYMPTOMS
ABDOMINAL PAIN: 0
CHEST TIGHTNESS: 0
RHINORRHEA: 0
CONSTIPATION: 0
SHORTNESS OF BREATH: 0
COUGH: 0
SORE THROAT: 0
SINUS PRESSURE: 0
DIARRHEA: 0

## 2019-07-31 ASSESSMENT — PATIENT HEALTH QUESTIONNAIRE - PHQ9
2. FEELING DOWN, DEPRESSED OR HOPELESS: 0
1. LITTLE INTEREST OR PLEASURE IN DOING THINGS: 0
SUM OF ALL RESPONSES TO PHQ QUESTIONS 1-9: 0
SUM OF ALL RESPONSES TO PHQ9 QUESTIONS 1 & 2: 0
SUM OF ALL RESPONSES TO PHQ QUESTIONS 1-9: 0

## 2019-07-31 NOTE — PROGRESS NOTES
person, place, and time. She appears well-developed. HENT:   Head: Normocephalic. Eyes: Conjunctivae and EOM are normal.   Neck: Neck supple. Cardiovascular: Normal rate, regular rhythm and normal heart sounds. Pulmonary/Chest: Effort normal and breath sounds normal.   Musculoskeletal: Normal range of motion. Neurological: She is alert and oriented to person, place, and time. Skin: Skin is warm and dry. Psychiatric: She has a normal mood and affect. Thought content normal.                ASSESSMENT & PLAN  1. Coronary artery disease involving coronary bypass graft of native heart without angina pectoris  Issue controlled. Continue meds. Refilled meds. - atorvastatin (LIPITOR) 10 MG tablet; Take 1 tablet by mouth daily  Dispense: 90 tablet; Refill: 1    2. Essential hypertension  Issue controlled. Continue meds. Refilled meds. - carvedilol (COREG) 6.25 MG tablet; Take 1 tablet by mouth 2 times daily (with meals)  Dispense: 180 tablet; Refill: 1  - CBC Auto Differential; Future  - Comprehensive Metabolic Panel; Future    3. Gastroesophageal reflux disease without esophagitis  We discussed nutrition changes see above. Continue present meds. - ranitidine (ZANTAC) 150 MG tablet; Take 1 tablet by mouth 2 times daily  Dispense: 180 tablet; Refill: 1    4. Acquired hypothyroidism  Issue is stable check labs today. Adjust medication off of lab results.  - TSH WITH REFLEX TO FT4; Future                 Return in about 6 months (around 1/31/2020).             Electronically signed by Amber Guerrero MD on 7/31/2019

## 2019-08-01 LAB
A/G RATIO: 1.1 (ref 1.1–2.2)
ALBUMIN SERPL-MCNC: 3.9 G/DL (ref 3.4–5)
ALP BLD-CCNC: 105 U/L (ref 40–129)
ALT SERPL-CCNC: 14 U/L (ref 10–40)
ANION GAP SERPL CALCULATED.3IONS-SCNC: 13 MMOL/L (ref 3–16)
AST SERPL-CCNC: 21 U/L (ref 15–37)
BASOPHILS ABSOLUTE: 0 K/UL (ref 0–0.2)
BASOPHILS RELATIVE PERCENT: 0.8 %
BILIRUB SERPL-MCNC: <0.2 MG/DL (ref 0–1)
BUN BLDV-MCNC: 17 MG/DL (ref 7–20)
CALCIUM SERPL-MCNC: 10.5 MG/DL (ref 8.3–10.6)
CHLORIDE BLD-SCNC: 99 MMOL/L (ref 99–110)
CO2: 28 MMOL/L (ref 21–32)
CREAT SERPL-MCNC: 1 MG/DL (ref 0.6–1.2)
EOSINOPHILS ABSOLUTE: 0.1 K/UL (ref 0–0.6)
EOSINOPHILS RELATIVE PERCENT: 2 %
GFR AFRICAN AMERICAN: >60
GFR NON-AFRICAN AMERICAN: 53
GLOBULIN: 3.4 G/DL
GLUCOSE BLD-MCNC: 73 MG/DL (ref 70–99)
HCT VFR BLD CALC: 38 % (ref 36–48)
HEMOGLOBIN: 12.5 G/DL (ref 12–16)
LYMPHOCYTES ABSOLUTE: 2.5 K/UL (ref 1–5.1)
LYMPHOCYTES RELATIVE PERCENT: 41.2 %
MCH RBC QN AUTO: 30.8 PG (ref 26–34)
MCHC RBC AUTO-ENTMCNC: 32.7 G/DL (ref 31–36)
MCV RBC AUTO: 94.2 FL (ref 80–100)
MONOCYTES ABSOLUTE: 0.8 K/UL (ref 0–1.3)
MONOCYTES RELATIVE PERCENT: 13.7 %
NEUTROPHILS ABSOLUTE: 2.5 K/UL (ref 1.7–7.7)
NEUTROPHILS RELATIVE PERCENT: 42.3 %
PDW BLD-RTO: 14.6 % (ref 12.4–15.4)
PLATELET # BLD: 218 K/UL (ref 135–450)
PMV BLD AUTO: 9.2 FL (ref 5–10.5)
POTASSIUM SERPL-SCNC: 4.4 MMOL/L (ref 3.5–5.1)
RBC # BLD: 4.04 M/UL (ref 4–5.2)
SODIUM BLD-SCNC: 140 MMOL/L (ref 136–145)
TOTAL PROTEIN: 7.3 G/DL (ref 6.4–8.2)
TSH REFLEX FT4: 2.85 UIU/ML (ref 0.27–4.2)
WBC # BLD: 6 K/UL (ref 4–11)

## 2019-08-16 DIAGNOSIS — E78.2 MIXED HYPERLIPIDEMIA: Primary | ICD-10-CM

## 2019-08-16 DIAGNOSIS — I25.810 CORONARY ARTERY DISEASE INVOLVING CORONARY BYPASS GRAFT OF NATIVE HEART WITHOUT ANGINA PECTORIS: ICD-10-CM

## 2019-08-16 RX ORDER — ATORVASTATIN CALCIUM 10 MG/1
10 TABLET, FILM COATED ORAL DAILY
Qty: 90 TABLET | Refills: 0 | Status: SHIPPED | OUTPATIENT
Start: 2019-08-16 | End: 2019-10-07 | Stop reason: SDUPTHER

## 2019-08-16 NOTE — PROGRESS NOTES
Patient dose not have a recent lipid panel to be evaluated for proper management of their cholesterol. Will give one refill, with the expectation that the patient have their labs drawn, which I have ordered, or bring in a copy of recent, within the last 6 months, results.        Electronically signed by OSIRIS Eddy CNP on 8/16/2019 at 4:49 PM

## 2019-09-20 ENCOUNTER — TELEPHONE (OUTPATIENT)
Dept: FAMILY MEDICINE CLINIC | Age: 84
End: 2019-09-20

## 2019-09-20 RX ORDER — LEVOTHYROXINE SODIUM 0.03 MG/1
25 TABLET ORAL DAILY
Qty: 90 TABLET | Refills: 1 | Status: SHIPPED | OUTPATIENT
Start: 2019-09-20 | End: 2019-10-07 | Stop reason: SDUPTHER

## 2019-09-20 NOTE — TELEPHONE ENCOUNTER
Requested Prescriptions     Signed Prescriptions Disp Refills    levothyroxine (SYNTHROID) 25 MCG tablet 90 tablet 1     Sig: Take 1 tablet by mouth Daily     Authorizing Provider: Marly Ayoub     Ordering User: Crispin Grissom

## 2019-10-07 DIAGNOSIS — K21.9 GASTROESOPHAGEAL REFLUX DISEASE WITHOUT ESOPHAGITIS: ICD-10-CM

## 2019-10-07 DIAGNOSIS — I10 ESSENTIAL HYPERTENSION: ICD-10-CM

## 2019-10-07 DIAGNOSIS — I25.810 CORONARY ARTERY DISEASE INVOLVING CORONARY BYPASS GRAFT OF NATIVE HEART WITHOUT ANGINA PECTORIS: ICD-10-CM

## 2019-10-07 RX ORDER — LEVOTHYROXINE SODIUM 0.03 MG/1
25 TABLET ORAL DAILY
Qty: 90 TABLET | Refills: 0 | Status: SHIPPED | OUTPATIENT
Start: 2019-10-07 | End: 2020-01-28 | Stop reason: SDUPTHER

## 2019-10-07 RX ORDER — CARVEDILOL 6.25 MG/1
6.25 TABLET ORAL 2 TIMES DAILY WITH MEALS
Qty: 180 TABLET | Refills: 0 | Status: SHIPPED | OUTPATIENT
Start: 2019-10-07 | End: 2020-01-06

## 2019-10-07 RX ORDER — ATORVASTATIN CALCIUM 10 MG/1
10 TABLET, FILM COATED ORAL DAILY
Qty: 90 TABLET | Refills: 0 | Status: SHIPPED | OUTPATIENT
Start: 2019-10-07 | End: 2020-02-05 | Stop reason: SDUPTHER

## 2019-10-07 RX ORDER — RANITIDINE 150 MG/1
150 TABLET ORAL 2 TIMES DAILY
Qty: 180 TABLET | Refills: 1 | Status: SHIPPED | OUTPATIENT
Start: 2019-10-07 | End: 2020-02-05

## 2020-01-06 RX ORDER — CARVEDILOL 6.25 MG/1
TABLET ORAL
Qty: 180 TABLET | Refills: 0 | Status: SHIPPED | OUTPATIENT
Start: 2020-01-06 | End: 2020-02-05 | Stop reason: SDUPTHER

## 2020-01-28 RX ORDER — LEVOTHYROXINE SODIUM 0.03 MG/1
25 TABLET ORAL DAILY
Qty: 90 TABLET | Refills: 0 | Status: SHIPPED | OUTPATIENT
Start: 2020-01-28 | End: 2020-02-05 | Stop reason: SDUPTHER

## 2020-02-05 ENCOUNTER — OFFICE VISIT (OUTPATIENT)
Dept: FAMILY MEDICINE CLINIC | Age: 85
End: 2020-02-05
Payer: MEDICARE

## 2020-02-05 VITALS
BODY MASS INDEX: 34.61 KG/M2 | SYSTOLIC BLOOD PRESSURE: 122 MMHG | WEIGHT: 160.4 LBS | DIASTOLIC BLOOD PRESSURE: 84 MMHG | HEIGHT: 57 IN | HEART RATE: 60 BPM

## 2020-02-05 DIAGNOSIS — E03.9 ACQUIRED HYPOTHYROIDISM: ICD-10-CM

## 2020-02-05 LAB — TSH REFLEX FT4: 2.42 UIU/ML (ref 0.27–4.2)

## 2020-02-05 PROCEDURE — 99214 OFFICE O/P EST MOD 30 MIN: CPT | Performed by: FAMILY MEDICINE

## 2020-02-05 PROCEDURE — G8510 SCR DEP NEG, NO PLAN REQD: HCPCS | Performed by: FAMILY MEDICINE

## 2020-02-05 RX ORDER — ATORVASTATIN CALCIUM 10 MG/1
10 TABLET, FILM COATED ORAL DAILY
Qty: 90 TABLET | Refills: 1 | Status: SHIPPED | OUTPATIENT
Start: 2020-02-05 | End: 2020-08-05 | Stop reason: SDUPTHER

## 2020-02-05 RX ORDER — FAMOTIDINE 20 MG/1
20 TABLET, FILM COATED ORAL 2 TIMES DAILY PRN
Qty: 60 TABLET | Refills: 3 | Status: SHIPPED | OUTPATIENT
Start: 2020-02-05 | End: 2020-05-28

## 2020-02-05 RX ORDER — LEVOTHYROXINE SODIUM 0.03 MG/1
25 TABLET ORAL DAILY
Qty: 90 TABLET | Refills: 1 | Status: SHIPPED | OUTPATIENT
Start: 2020-02-05 | End: 2020-08-05 | Stop reason: SDUPTHER

## 2020-02-05 RX ORDER — CARVEDILOL 6.25 MG/1
6.25 TABLET ORAL 2 TIMES DAILY
Qty: 180 TABLET | Refills: 1 | Status: SHIPPED | OUTPATIENT
Start: 2020-02-05 | End: 2020-08-05 | Stop reason: SDUPTHER

## 2020-02-05 ASSESSMENT — PATIENT HEALTH QUESTIONNAIRE - PHQ9
1. LITTLE INTEREST OR PLEASURE IN DOING THINGS: 0
SUM OF ALL RESPONSES TO PHQ QUESTIONS 1-9: 0
SUM OF ALL RESPONSES TO PHQ QUESTIONS 1-9: 0
SUM OF ALL RESPONSES TO PHQ9 QUESTIONS 1 & 2: 0
2. FEELING DOWN, DEPRESSED OR HOPELESS: 0

## 2020-02-05 NOTE — PROGRESS NOTES
2/5/2020    Ryan Velazquez    Chief Complaint   Patient presents with    6 Month Follow-Up    Gastroesophageal Reflux     pt stopped ranitidine d/t recall. need alternative       HPI  Amy Aiken is a 80 y.o. female who presents today for follow up:    Patient is without complaints today. Patient has some changes related to her insurance. Her Advair changed over to [employer] which is a generic. Her ranitidine has had manufacturing problems and she wishes for a replacement as her reflux is no longer controlled. Patient notes no problems with her or side effects with her levothyroxine. She notes compliance with it. She wishes to continue it. Patient's blood pressures controlled well today. She is not been having orthostatic symptoms. She notes compliance with her blood pressure medication. Review of systems    Patient denies constitutional symptoms of infection, chest pain, shortness of breath, abdominal pain or diarrhea. She denies edema. PAST MEDICAL HISTORY  Past Medical History:   Diagnosis Date    [facility]     Bronchospasm     Essential hypertension     Family history of coronary artery disease     GERD (gastroesophageal reflux disease)     H/O cardiovascular stress test 08/13/2013    EF 70%, no ischemia, normal perfusion pattern in all regions, normal study.  H/O echocardiogram 06/05/2019    EF 87-52%, Grade I diastolic dysfunction, Mild septal wall asymmetrical left ventricular hypertophy, sclerotic but non stenotic aortic vavle, mitral annular calcification, Mild AR,     Headache     History of PTCA 1995,10/7/05,10/25/05    LAD,RCAX2 Ramus    Hx of cardiovascular stress test     11/12/2009=normal perfusion EF 70%, pt c/o 6/10 chest pain during adenosine infusion;  03/2008=EF 65%; 02/2006=EF 70%; 05/2005; 08/2001;10/1997; 09/1996    Hx of echocardiogram 8/13/13,11/12/09,05/2006,08/2001,10/1997    8/13-EF 50-55% essentially normal study.  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    LIMA-LAD and SVG-OM    VHD (valvular heart disease)        FAMILY HISTORY  Family History   Problem Relation Age of Onset    Heart Failure Mother     Heart Disease Father     Heart Disease Sister     Heart Disease Sister     Heart Surgery Sister     Rheum Arthritis Sister     Colon Cancer Other        SOCIAL HISTORY  Social History     Socioeconomic History    Marital status:       Spouse name: Not on file    Number of children: 11    Years of education: Not on file    Highest education level: Not on file   Occupational History    Not on file   Social Needs    Financial resource strain: Not on file    Food insecurity:     Worry: Not on file     Inability: Not on file    Transportation needs:     Medical: Not on file     Non-medical: Not on file   Tobacco Use    Smoking status: Never Smoker    Smokeless tobacco: Never Used   Substance and Sexual Activity    Alcohol use: No     Alcohol/week: 0.0 standard drinks    Drug use: No    Sexual activity: Not on file     Comment:    Lifestyle    Physical activity:     Days per week: Not on file     Minutes per session: Not on file    Stress: Not on file   Relationships    Social connections:     Talks on phone: Not on file     Gets together: Not on file     Attends Congregational service: Not on file     Active member of club or organization: Not on file     Attends meetings of clubs or organizations: Not on file     Relationship status: Not on file    Intimate partner violence:     Fear of current or ex partner: Not on file     Emotionally abused: Not on file     Physically abused: Not on file     Forced sexual activity: Not on file   Other Topics Concern    Not on file   Social History Narrative    Not on file        SURGICAL HISTORY  Past Surgical History:   Procedure Laterality Date    CATARACT REMOVAL WITH IMPLANT Bilateral 2006    CHOLECYSTECTOMY      CORONARY ANGIOPLASTY  2005, 1995    10/25/2005=ramus 2.5x15mm taxus and 2.5x8mm ostium and proximal portion of the ramus; PTCA LAD in [address]  07/2016    x 2    THORACENTESIS Left 08/15/2016    525 ml s/s       CURRENT MEDICATIONS  Current Outpatient Medications   Medication Sig Dispense Refill    aspirin 81 MG tablet Take 81 mg by mouth daily      carvedilol (COREG) 6.25 MG tablet Take 1 tablet by mouth 2 times daily 180 tablet 1    atorvastatin (LIPITOR) 10 MG tablet Take 1 tablet by mouth daily 90 tablet 1    famotidine (PEPCID) 20 MG tablet Take 1 tablet by mouth 2 times daily as needed (heart burn) 60 tablet 3    fluticasone-salmeterol (WIXELA INHUB) 250-50 MCG/DOSE AEPB Inhale 1 puff into the lungs every 12 hours 60 each 3    apixaban (ELIQUIS) 5 MG TABS tablet Take 1 tablet by mouth 2 times daily 180 tablet 1    levothyroxine (SYNTHROID) 25 MCG tablet Take 1 tablet by mouth Daily 90 tablet 1    acetaminophen (TYLENOL) 500 MG tablet Take 500 mg by mouth every 6 hours as needed for Pain      nitroGLYCERIN (NITROSTAT) 0.4 MG SL tablet Place 1 tablet under the tongue every 5 minutes as needed for Chest pain 25 tablet 3    multivitamin (THERAGRAN) per tablet Take 1 tablet by mouth daily.  albuterol (PROVENTIL;VENTOLIN) 90 MCG/ACT inhaler Inhale 2 puffs into the lungs 4 times daily as needed        No current facility-administered medications for this visit.         ALLERGIES  Allergies   Allergen Reactions    Sulfa Antibiotics Other (See Comments)     Cannot remember; rash per paper chart    Zoloft [Sertraline Hcl] Other (See Comments)     Shakes          PHYSICAL EXAM  /84   Pulse 60   Ht 4' 9\" (1.448 m)   Wt 160 lb 6.4 oz (72.8 kg)   BMI 34.71 kg/m²     Physical Exam  Constitutional:       Appearance: She is well-developed. HENT:      Head: Normocephalic. Eyes:      Conjunctiva/sclera: Conjunctivae normal.   Neck:      Musculoskeletal: Neck supple. Cardiovascular:      Rate and Rhythm: Normal rate and regular rhythm. Heart sounds: Normal heart sounds. Pulmonary:      Effort: Pulmonary effort is normal.      Breath sounds: Normal breath sounds. Musculoskeletal: Normal range of motion. Skin:     General: Skin is warm and dry. Neurological:      Mental Status: She is alert and oriented to person, place, and time. Psychiatric:         Thought Content: Thought content normal.                  ASSESSMENT & PLAN  1. Essential hypertension  Issue controlled. Continue meds. Refilled meds. - carvedilol (COREG) 6.25 MG tablet; Take 1 tablet by mouth 2 times daily  Dispense: 180 tablet; Refill: 1    2. Acquired hypothyroidism  Issue is stable check labs today. Adjust medication off of lab results.  - TSH WITH REFLEX TO FT4; Future    3. Gastroesophageal reflux disease without esophagitis  Symptoms controlled change to Pepcid due to the above    4. Paroxysmal atrial fibrillation (HCC)  Issue controlled. Continue meds. Refilled meds. 5. Coronary artery disease involving coronary bypass graft of native heart without angina pectoris  Issue controlled. Continue meds. Refilled meds. - atorvastatin (LIPITOR) 10 MG tablet; Take 1 tablet by mouth daily  Dispense: 90 tablet; Refill: 1                 Return in about 6 months (around 8/5/2020).             Electronically signed by Jo Avery MD on 2/5/2020

## 2020-03-23 ENCOUNTER — TELEPHONE (OUTPATIENT)
Dept: INTERNAL MEDICINE CLINIC | Age: 85
End: 2020-03-23

## 2020-05-11 ENCOUNTER — TELEPHONE (OUTPATIENT)
Dept: FAMILY MEDICINE CLINIC | Age: 85
End: 2020-05-11

## 2020-05-11 ENCOUNTER — OFFICE VISIT (OUTPATIENT)
Dept: FAMILY MEDICINE CLINIC | Age: 85
End: 2020-05-11
Payer: MEDICARE

## 2020-05-11 VITALS
HEIGHT: 59 IN | SYSTOLIC BLOOD PRESSURE: 130 MMHG | WEIGHT: 160 LBS | DIASTOLIC BLOOD PRESSURE: 80 MMHG | BODY MASS INDEX: 32.25 KG/M2

## 2020-05-11 PROCEDURE — 1123F ACP DISCUSS/DSCN MKR DOCD: CPT | Performed by: PHYSICIAN ASSISTANT

## 2020-05-11 PROCEDURE — G8427 DOCREV CUR MEDS BY ELIG CLIN: HCPCS | Performed by: PHYSICIAN ASSISTANT

## 2020-05-11 PROCEDURE — 99213 OFFICE O/P EST LOW 20 MIN: CPT | Performed by: PHYSICIAN ASSISTANT

## 2020-05-11 PROCEDURE — 1036F TOBACCO NON-USER: CPT | Performed by: PHYSICIAN ASSISTANT

## 2020-05-11 PROCEDURE — 96372 THER/PROPH/DIAG INJ SC/IM: CPT | Performed by: PHYSICIAN ASSISTANT

## 2020-05-11 PROCEDURE — 1090F PRES/ABSN URINE INCON ASSESS: CPT | Performed by: PHYSICIAN ASSISTANT

## 2020-05-11 PROCEDURE — G8417 CALC BMI ABV UP PARAM F/U: HCPCS | Performed by: PHYSICIAN ASSISTANT

## 2020-05-11 PROCEDURE — 4040F PNEUMOC VAC/ADMIN/RCVD: CPT | Performed by: PHYSICIAN ASSISTANT

## 2020-05-11 RX ORDER — TRAMADOL HYDROCHLORIDE 50 MG/1
50 TABLET ORAL EVERY 4 HOURS PRN
Qty: 18 TABLET | Refills: 0 | Status: CANCELLED | OUTPATIENT
Start: 2020-05-11 | End: 2020-05-14

## 2020-05-11 RX ORDER — KETOROLAC TROMETHAMINE 30 MG/ML
60 INJECTION, SOLUTION INTRAMUSCULAR; INTRAVENOUS ONCE
Status: COMPLETED | OUTPATIENT
Start: 2020-05-11 | End: 2020-05-11

## 2020-05-11 RX ORDER — TRAMADOL HYDROCHLORIDE 50 MG/1
50 TABLET ORAL EVERY 4 HOURS PRN
Qty: 42 TABLET | Refills: 0 | Status: SHIPPED | OUTPATIENT
Start: 2020-05-11 | End: 2020-05-15 | Stop reason: ALTCHOICE

## 2020-05-11 RX ORDER — TIZANIDINE 2 MG/1
2 TABLET ORAL NIGHTLY PRN
Qty: 30 TABLET | Refills: 0 | Status: SHIPPED | OUTPATIENT
Start: 2020-05-11 | End: 2020-08-05

## 2020-05-11 RX ORDER — BACLOFEN 5 MG/1
TABLET ORAL
Qty: 30 TABLET | Refills: 0 | Status: CANCELLED | OUTPATIENT
Start: 2020-05-11

## 2020-05-11 RX ORDER — ALBUTEROL SULFATE 90 UG/1
2 AEROSOL, METERED RESPIRATORY (INHALATION) 4 TIMES DAILY PRN
Qty: 1 INHALER | Refills: 5 | Status: SHIPPED | OUTPATIENT
Start: 2020-05-11 | End: 2020-08-05

## 2020-05-11 RX ADMIN — KETOROLAC TROMETHAMINE 60 MG: 30 INJECTION, SOLUTION INTRAMUSCULAR; INTRAVENOUS at 16:52

## 2020-05-11 ASSESSMENT — ENCOUNTER SYMPTOMS
DIARRHEA: 0
CONSTIPATION: 0
COUGH: 1
SHORTNESS OF BREATH: 1
EYE PAIN: 0
BLOOD IN STOOL: 0
SORE THROAT: 0
RHINORRHEA: 0
ABDOMINAL PAIN: 0

## 2020-05-11 NOTE — PROGRESS NOTES
Cardiovascular:      Rate and Rhythm: Normal rate and regular rhythm. Heart sounds: No murmur. No friction rub. No gallop. Pulmonary:      Effort: Pulmonary effort is normal.      Breath sounds: Normal breath sounds. Abdominal:      Tenderness: There is no right CVA tenderness or left CVA tenderness. Musculoskeletal:      Comments: Normal lordotic curve. No TTP over spinal processes. Slight TTP over paravertebral muscles in thoracic region, left of midline. Patient is in a wheelchair. Skin:     General: Skin is warm and dry. Neurological:      Mental Status: She is alert and oriented to person, place, and time. Comments: Cranial nerves II-XII grossly intact   Psychiatric:         Mood and Affect: Mood normal.         Behavior: Behavior normal.         ASSESSMENT & PLAN  1. Acute left-sided low back pain without sciatica  Administered 60 mg IM injection of Toradol. Prescribed Tizanidine and Tramadol for back pain. The patient has been exceeding the maximum dose of Tylenol without relief and she's been instructed not to take NSAID's due to chronic therapy with Eliquis. Instructed the patient to continue with heat and rest.  - ketorolac (TORADOL) injection 60 mg  - tiZANidine (ZANAFLEX) 2 MG tablet; Take 1 tablet by mouth nightly as needed (muscle spasms)  Dispense: 30 tablet; Refill: 0  - traMADol (ULTRAM) 50 MG tablet; Take 1 tablet by mouth every 4 hours as needed for Pain for up to 7 days. Intended supply: 7 days. Take lowest dose possible to manage pain  Dispense: 42 tablet; Refill: 0    2. Bronchospasm  Refilled albuterol inhaler. - albuterol sulfate HFA (VENTOLIN HFA) 108 (90 Base) MCG/ACT inhaler; Inhale 2 puffs into the lungs 4 times daily as needed for Wheezing  Dispense: 1 Inhaler; Refill: 5          No follow-ups on file.             Electronically signed by Gavino Pineda PA-C on 5/11/2020

## 2020-05-13 ENCOUNTER — TELEPHONE (OUTPATIENT)
Dept: FAMILY MEDICINE CLINIC | Age: 85
End: 2020-05-13

## 2020-05-14 ENCOUNTER — TELEPHONE (OUTPATIENT)
Dept: FAMILY MEDICINE CLINIC | Age: 85
End: 2020-05-14

## 2020-05-14 NOTE — TELEPHONE ENCOUNTER
Call [address]. I am sorry I do not know what happened yesterday's message. I do not have any unanswered messages. Your mom's pulled muscles are tricky. She cannot use nonsteroidals like ibuprofen Motrin or Aleve due to her Eliquis. She is able to use Tylenol and that would be 500 mg 2 pills 3 times a day as a max. She can use ice if she truly has pulled muscles in the first day or 2 after pulling them. Afterwards probably more heat and stretching. She does have muscle relaxers on her list I believe tizanidine. Please let us know if there is more you can think of we can do for her.

## 2020-05-15 ENCOUNTER — APPOINTMENT (OUTPATIENT)
Dept: CT IMAGING | Age: 85
End: 2020-05-15
Payer: MEDICARE

## 2020-05-15 ENCOUNTER — APPOINTMENT (OUTPATIENT)
Dept: GENERAL RADIOLOGY | Age: 85
End: 2020-05-15
Payer: MEDICARE

## 2020-05-15 ENCOUNTER — HOSPITAL ENCOUNTER (EMERGENCY)
Age: 85
Discharge: HOME | End: 2020-05-15
Payer: MEDICARE

## 2020-05-15 VITALS
SYSTOLIC BLOOD PRESSURE: 148 MMHG | TEMPERATURE: 98 F | BODY MASS INDEX: 32.25 KG/M2 | OXYGEN SATURATION: 95 % | HEART RATE: 67 BPM | DIASTOLIC BLOOD PRESSURE: 55 MMHG | WEIGHT: 160 LBS | HEIGHT: 59 IN | RESPIRATION RATE: 16 BRPM

## 2020-05-15 LAB
ALBUMIN SERPL-MCNC: 3.5 GM/DL (ref 3.4–5)
ALP BLD-CCNC: 119 IU/L (ref 40–129)
ALT SERPL-CCNC: 26 U/L (ref 10–40)
ANION GAP SERPL CALCULATED.3IONS-SCNC: 11 MMOL/L (ref 4–16)
AST SERPL-CCNC: 27 IU/L (ref 15–37)
BACTERIA: ABNORMAL /HPF
BASOPHILS ABSOLUTE: 0 K/CU MM
BASOPHILS RELATIVE PERCENT: 0.2 % (ref 0–1)
BILIRUB SERPL-MCNC: 0.3 MG/DL (ref 0–1)
BILIRUBIN URINE: NEGATIVE MG/DL
BLOOD, URINE: NEGATIVE
BUN BLDV-MCNC: 21 MG/DL (ref 6–23)
CALCIUM SERPL-MCNC: 9.1 MG/DL (ref 8.3–10.6)
CHLORIDE BLD-SCNC: 94 MMOL/L (ref 99–110)
CLARITY: CLEAR
CO2: 25 MMOL/L (ref 21–32)
COLOR: YELLOW
CREAT SERPL-MCNC: 0.9 MG/DL (ref 0.6–1.1)
DIFFERENTIAL TYPE: ABNORMAL
EOSINOPHILS ABSOLUTE: 0.1 K/CU MM
EOSINOPHILS RELATIVE PERCENT: 1.1 % (ref 0–3)
GFR AFRICAN AMERICAN: >60 ML/MIN/1.73M2
GFR NON-AFRICAN AMERICAN: 59 ML/MIN/1.73M2
GLUCOSE BLD-MCNC: 93 MG/DL (ref 70–99)
GLUCOSE, URINE: NEGATIVE MG/DL
HCT VFR BLD CALC: 38.4 % (ref 37–47)
HEMOGLOBIN: 12.3 GM/DL (ref 12.5–16)
IMMATURE NEUTROPHIL %: 0.5 % (ref 0–0.43)
KETONES, URINE: NEGATIVE MG/DL
LACTATE: 1.1 MMOL/L (ref 0.4–2)
LEUKOCYTE ESTERASE, URINE: NEGATIVE
LIPASE: 81 IU/L (ref 13–60)
LYMPHOCYTES ABSOLUTE: 2.2 K/CU MM
LYMPHOCYTES RELATIVE PERCENT: 25.9 % (ref 24–44)
MCH RBC QN AUTO: 30.1 PG (ref 27–31)
MCHC RBC AUTO-ENTMCNC: 32 % (ref 32–36)
MCV RBC AUTO: 94.1 FL (ref 78–100)
MONOCYTES ABSOLUTE: 1.1 K/CU MM
MONOCYTES RELATIVE PERCENT: 13.1 % (ref 0–4)
NITRITE URINE, QUANTITATIVE: NEGATIVE
NUCLEATED RBC %: 0 %
PDW BLD-RTO: 14.2 % (ref 11.7–14.9)
PH, URINE: 6 (ref 5–8)
PLATELET # BLD: 229 K/CU MM (ref 140–440)
PMV BLD AUTO: 9.6 FL (ref 7.5–11.1)
POTASSIUM SERPL-SCNC: 5 MMOL/L (ref 3.5–5.1)
PROTEIN UA: NEGATIVE MG/DL
RBC # BLD: 4.08 M/CU MM (ref 4.2–5.4)
RBC URINE: 2 /HPF (ref 0–6)
SEGMENTED NEUTROPHILS ABSOLUTE COUNT: 5 K/CU MM
SEGMENTED NEUTROPHILS RELATIVE PERCENT: 59.2 % (ref 36–66)
SODIUM BLD-SCNC: 130 MMOL/L (ref 135–145)
SPECIFIC GRAVITY UA: 1.01 (ref 1–1.03)
SQUAMOUS EPITHELIAL: <1 /HPF
TOTAL IMMATURE NEUTOROPHIL: 0.04 K/CU MM
TOTAL NUCLEATED RBC: 0 K/CU MM
TOTAL PROTEIN: 6.5 GM/DL (ref 6.4–8.2)
TRICHOMONAS: ABNORMAL /HPF
TROPONIN T: <0.01 NG/ML
UROBILINOGEN, URINE: NORMAL MG/DL (ref 0.2–1)
WBC # BLD: 8.5 K/CU MM (ref 4–10.5)
WBC UA: 2 /HPF (ref 0–5)

## 2020-05-15 PROCEDURE — 93005 ELECTROCARDIOGRAM TRACING: CPT | Performed by: PHYSICIAN ASSISTANT

## 2020-05-15 PROCEDURE — 80053 COMPREHEN METABOLIC PANEL: CPT

## 2020-05-15 PROCEDURE — 6360000004 HC RX CONTRAST MEDICATION: Performed by: PHYSICIAN ASSISTANT

## 2020-05-15 PROCEDURE — 71045 X-RAY EXAM CHEST 1 VIEW: CPT

## 2020-05-15 PROCEDURE — 83605 ASSAY OF LACTIC ACID: CPT

## 2020-05-15 PROCEDURE — 6370000000 HC RX 637 (ALT 250 FOR IP): Performed by: PHYSICIAN ASSISTANT

## 2020-05-15 PROCEDURE — 81001 URINALYSIS AUTO W/SCOPE: CPT

## 2020-05-15 PROCEDURE — 71260 CT THORAX DX C+: CPT

## 2020-05-15 PROCEDURE — 84484 ASSAY OF TROPONIN QUANT: CPT

## 2020-05-15 PROCEDURE — 36415 COLL VENOUS BLD VENIPUNCTURE: CPT

## 2020-05-15 PROCEDURE — 99284 EMERGENCY DEPT VISIT MOD MDM: CPT

## 2020-05-15 PROCEDURE — 85025 COMPLETE CBC W/AUTO DIFF WBC: CPT

## 2020-05-15 PROCEDURE — 83690 ASSAY OF LIPASE: CPT

## 2020-05-15 PROCEDURE — 93010 ELECTROCARDIOGRAM REPORT: CPT | Performed by: INTERNAL MEDICINE

## 2020-05-15 PROCEDURE — 87086 URINE CULTURE/COLONY COUNT: CPT

## 2020-05-15 PROCEDURE — 74177 CT ABD & PELVIS W/CONTRAST: CPT

## 2020-05-15 RX ORDER — HYDROCODONE BITARTRATE AND ACETAMINOPHEN 5; 325 MG/1; MG/1
1 TABLET ORAL EVERY 8 HOURS PRN
Qty: 10 TABLET | Refills: 0 | Status: SHIPPED | OUTPATIENT
Start: 2020-05-15 | End: 2020-05-21 | Stop reason: SDUPTHER

## 2020-05-15 RX ORDER — ONDANSETRON 4 MG/1
4 TABLET, ORALLY DISINTEGRATING ORAL ONCE
Status: COMPLETED | OUTPATIENT
Start: 2020-05-15 | End: 2020-05-15

## 2020-05-15 RX ORDER — HYDROCODONE BITARTRATE AND ACETAMINOPHEN 5; 325 MG/1; MG/1
1 TABLET ORAL ONCE
Status: COMPLETED | OUTPATIENT
Start: 2020-05-15 | End: 2020-05-15

## 2020-05-15 RX ORDER — ONDANSETRON 4 MG/1
4 TABLET, ORALLY DISINTEGRATING ORAL EVERY 8 HOURS PRN
Qty: 15 TABLET | Refills: 0 | Status: SHIPPED | OUTPATIENT
Start: 2020-05-15 | End: 2020-12-02

## 2020-05-15 RX ADMIN — ONDANSETRON 4 MG: 4 TABLET, ORALLY DISINTEGRATING ORAL at 09:03

## 2020-05-15 RX ADMIN — IOPAMIDOL 80 ML: 755 INJECTION, SOLUTION INTRAVENOUS at 10:37

## 2020-05-15 RX ADMIN — HYDROCODONE BITARTRATE AND ACETAMINOPHEN 1 TABLET: 5; 325 TABLET ORAL at 11:01

## 2020-05-15 ASSESSMENT — PAIN DESCRIPTION - FREQUENCY: FREQUENCY: INTERMITTENT

## 2020-05-15 ASSESSMENT — PAIN DESCRIPTION - PROGRESSION: CLINICAL_PROGRESSION: GRADUALLY WORSENING

## 2020-05-15 ASSESSMENT — PAIN DESCRIPTION - LOCATION: LOCATION: BACK

## 2020-05-15 ASSESSMENT — PAIN DESCRIPTION - ORIENTATION: ORIENTATION: LOWER

## 2020-05-15 ASSESSMENT — PAIN DESCRIPTION - ONSET: ONSET: ON-GOING

## 2020-05-15 ASSESSMENT — PAIN SCALES - GENERAL
PAINLEVEL_OUTOF10: 9
PAINLEVEL_OUTOF10: 8

## 2020-05-15 ASSESSMENT — PAIN DESCRIPTION - PAIN TYPE: TYPE: ACUTE PAIN;CHRONIC PAIN

## 2020-05-15 ASSESSMENT — PAIN DESCRIPTION - DESCRIPTORS: DESCRIPTORS: ACHING

## 2020-05-15 NOTE — TELEPHONE ENCOUNTER
Spoke with patients daughter at 18am regard. Message below per Dottie Starkey patient's daughter voiced understanding and states she will take the patient to the er.   Electronically signed by George Hernandez LPN on 5/04/0364 at 5:81 AM

## 2020-05-15 NOTE — ED PROVIDER NOTES
EKG:  Normal sinus rhythm with a rate of 68. KS interval 186, QRS 82, QTc 401. No ST elevations or depressions. Normal T waves. Q waves in the lateral leads. Low ventricular perjury. Impression: Abnormal EKG. When compared to previous EKG from 8/9/2016, the baseline interference is improved, otherwise no significant changes.      Austin Bailey MD  05/15/20 5557

## 2020-05-15 NOTE — ED PROVIDER NOTES
Inhaler 5    WIXELA INHUB 250-50 MCG/DOSE AEPB INHALE 1 PUFF INTO THE LUNGS EVERY 12 HOURS 1 Inhaler 2    aspirin 81 MG tablet Take 81 mg by mouth daily      carvedilol (COREG) 6.25 MG tablet Take 1 tablet by mouth 2 times daily 180 tablet 1    atorvastatin (LIPITOR) 10 MG tablet Take 1 tablet by mouth daily 90 tablet 1    famotidine (PEPCID) 20 MG tablet Take 1 tablet by mouth 2 times daily as needed (heart burn) 60 tablet 3    apixaban (ELIQUIS) 5 MG TABS tablet Take 1 tablet by mouth 2 times daily 180 tablet 1    levothyroxine (SYNTHROID) 25 MCG tablet Take 1 tablet by mouth Daily 90 tablet 1    acetaminophen (TYLENOL) 500 MG tablet Take 500 mg by mouth every 6 hours as needed for Pain      nitroGLYCERIN (NITROSTAT) 0.4 MG SL tablet Place 1 tablet under the tongue every 5 minutes as needed for Chest pain 25 tablet 3    multivitamin (THERAGRAN) per tablet Take 1 tablet by mouth daily.  albuterol (PROVENTIL;VENTOLIN) 90 MCG/ACT inhaler Inhale 2 puffs into the lungs 4 times daily as needed          ALLERGIES    Allergies   Allergen Reactions    Sulfa Antibiotics Other (See Comments)     Cannot remember; rash per paper chart    Zoloft [Sertraline Hcl] Other (See Comments)     Shakes          SOCIAL AND FAMILY HISTORY    Social History     Socioeconomic History    Marital status:       Spouse name: None    Number of children: 5    Years of education: None    Highest education level: None   Occupational History    None   Social Needs    Financial resource strain: None    Food insecurity     Worry: None     Inability: None    Transportation needs     Medical: None     Non-medical: None   Tobacco Use    Smoking status: Never Smoker    Smokeless tobacco: Never Used   Substance and Sexual Activity    Alcohol use: No     Alcohol/week: 0.0 standard drinks    Drug use: No    Sexual activity: None     Comment:    Lifestyle    Physical activity     Days per week: None     Minutes per GM/DL    Hematocrit 38.4 37 - 47 %    MCV 94.1 78 - 100 FL    MCH 30.1 27 - 31 PG    MCHC 32.0 32.0 - 36.0 %    RDW 14.2 11.7 - 14.9 %    Platelets 354 772 - 283 K/CU MM    MPV 9.6 7.5 - 11.1 FL    Differential Type AUTOMATED DIFFERENTIAL     Segs Relative 59.2 36 - 66 %    Lymphocytes % 25.9 24 - 44 %    Monocytes % 13.1 (H) 0 - 4 %    Eosinophils % 1.1 0 - 3 %    Basophils % 0.2 0 - 1 %    Segs Absolute 5.0 K/CU MM    Lymphocytes Absolute 2.2 K/CU MM    Monocytes Absolute 1.1 K/CU MM    Eosinophils Absolute 0.1 K/CU MM    Basophils Absolute 0.0 K/CU MM    Nucleated RBC % 0.0 %    Total Nucleated RBC 0.0 K/CU MM    Total Immature Neutrophil 0.04 K/CU MM    Immature Neutrophil % 0.5 (H) 0 - 0.43 %   Comprehensive Metabolic Panel   Result Value Ref Range    Sodium 130 (L) 135 - 145 MMOL/L    Potassium 5.0 3.5 - 5.1 MMOL/L    Chloride 94 (L) 99 - 110 mMol/L    CO2 25 21 - 32 MMOL/L    BUN 21 6 - 23 MG/DL    CREATININE 0.9 0.6 - 1.1 MG/DL    Glucose 93 70 - 99 MG/DL    Calcium 9.1 8.3 - 10.6 MG/DL    Alb 3.5 3.4 - 5.0 GM/DL    Total Protein 6.5 6.4 - 8.2 GM/DL    Total Bilirubin 0.3 0.0 - 1.0 MG/DL    ALT 26 10 - 40 U/L    AST 27 15 - 37 IU/L    Alkaline Phosphatase 119 40 - 129 IU/L    GFR Non- 59 (L) >60 mL/min/1.73m2    GFR African American >60 >60 mL/min/1.73m2    Anion Gap 11 4 - 16   Lipase   Result Value Ref Range    Lipase 81 (H) 13 - 60 IU/L   Urinalysis   Result Value Ref Range    Color, UA YELLOW YELLOW    Clarity, UA CLEAR CLEAR    Glucose, Urine NEGATIVE NEGATIVE MG/DL    Bilirubin Urine NEGATIVE NEGATIVE MG/DL    Ketones, Urine NEGATIVE NEGATIVE MG/DL    Specific Gravity, UA 1.014 1.001 - 1.035    Blood, Urine NEGATIVE NEGATIVE    pH, Urine 6.0 5.0 - 8.0    Protein, UA NEGATIVE NEGATIVE MG/DL    Urobilinogen, Urine NORMAL 0.2 - 1.0 MG/DL    Nitrite Urine, Quantitative NEGATIVE NEGATIVE    Leukocyte Esterase, Urine NEGATIVE NEGATIVE    RBC, UA 2 0 - 6 /HPF    WBC, UA 2 0 - 5 /HPF

## 2020-05-15 NOTE — ED NOTES
Piper Overton responded to page at [facility] KAREEM Scott  05/15/20 [id number]

## 2020-05-15 NOTE — ED NOTES
Case Management reviewed all discharge instructions and follow up care with patient's Daughter. [facility] at bedside to transport patient home. Patient discharged home at this time.      Lupe Mckenzie RN  05/15/20 [id number]

## 2020-05-16 LAB
CULTURE: NORMAL
Lab: NORMAL
SPECIMEN: NORMAL

## 2020-05-18 ENCOUNTER — TELEPHONE (OUTPATIENT)
Dept: FAMILY MEDICINE CLINIC | Age: 85
End: 2020-05-18

## 2020-05-19 LAB
EKG ATRIAL RATE: 68 BPM
EKG DIAGNOSIS: NORMAL
EKG P AXIS: 51 DEGREES
EKG P-R INTERVAL: 196 MS
EKG Q-T INTERVAL: 378 MS
EKG QRS DURATION: 82 MS
EKG QTC CALCULATION (BAZETT): 401 MS
EKG R AXIS: -26 DEGREES
EKG T AXIS: 20 DEGREES
EKG VENTRICULAR RATE: 68 BPM

## 2020-05-21 ENCOUNTER — OFFICE VISIT (OUTPATIENT)
Dept: FAMILY MEDICINE CLINIC | Age: 85
End: 2020-05-21
Payer: MEDICARE

## 2020-05-21 VITALS
WEIGHT: 160 LBS | BODY MASS INDEX: 32.25 KG/M2 | OXYGEN SATURATION: 82 % | TEMPERATURE: 97.2 F | SYSTOLIC BLOOD PRESSURE: 142 MMHG | HEIGHT: 59 IN | DIASTOLIC BLOOD PRESSURE: 74 MMHG | HEART RATE: 57 BPM

## 2020-05-21 PROCEDURE — 99213 OFFICE O/P EST LOW 20 MIN: CPT | Performed by: FAMILY MEDICINE

## 2020-05-21 RX ORDER — ONDANSETRON 4 MG/1
4 TABLET, ORALLY DISINTEGRATING ORAL 3 TIMES DAILY PRN
Qty: 60 TABLET | Refills: 3 | Status: SHIPPED | OUTPATIENT
Start: 2020-05-21 | End: 2020-08-05

## 2020-05-21 RX ORDER — HYDROCODONE BITARTRATE AND ACETAMINOPHEN 5; 325 MG/1; MG/1
1 TABLET ORAL EVERY 8 HOURS PRN
Qty: 21 TABLET | Refills: 0 | Status: SHIPPED | OUTPATIENT
Start: 2020-05-21 | End: 2020-05-28

## 2020-05-22 NOTE — PROGRESS NOTES
5/21/2020    Maliha Stewart    Chief Complaint   Patient presents with   [facility] Other     ER f/u. pt stated that she is in pain, lower back pain in between. memory is getting worse. muscle relaxers doesn't seem to work. pt don't want to walk because of her pain. pt stated that her side and back hurt when she breathe. the er doctor found a nodule on her lung. no med refills requested at this time. HPI    Antonieta Rai is a 80 y.o. female who presents today with follow-up. Patient notes positional pain in her mid lower left side of her back. It is well reproducible and bothers her a lot upon position changes. Patient following up with me after being seen in our clinic 10 days ago and the emergency room  Approximately 5 days ago. Best response to medication was a Toradol shot but patient is on blood thinners and cannot continue NSAIDs or continue Toradol shots. The emergency room did a CT scan of her back that showed a compression deformity at T12. Patient has gotten some physical therapy that puts pressure at the spot gives her some relief. Patient has not tried a course that or a lumbar brace. Patient does have home physical therapy.   Emergency room checked and ruled out urine infection as well as chest x-ray showed some mild left lung atelectasis but patient has no clinical symptoms of infection    REVIEW OF SYSTEMS    Constitutional:  Denies fever, chills, weight loss or weakness  Eyes:  no photophobia or discharge  ENT:  no sore throat or ear pain  Cardiovascular:  Denies chest pain, palpitations or swelling  Respiratory:  Denies cough or shortness of breath  GI:  no abdominal pain, nausea, vomiting, or diarrhea  Musculoskeletal: Back tenderness to palpation from T9 down to L2 on the left side  Skin:  No rashes  Neurologic:  no headache, focal weakness, or sensory changes  Endocrine:  no polyuria or polydipsia      PAST MEDICAL HISTORY  Past Medical History:   Diagnosis Date    A-fib ([facility]     EVERY 12 HOURS 1 Inhaler 2    aspirin 81 MG tablet Take 81 mg by mouth daily      carvedilol (COREG) 6.25 MG tablet Take 1 tablet by mouth 2 times daily 180 tablet 1    atorvastatin (LIPITOR) 10 MG tablet Take 1 tablet by mouth daily 90 tablet 1    famotidine (PEPCID) 20 MG tablet Take 1 tablet by mouth 2 times daily as needed (heart burn) 60 tablet 3    apixaban (ELIQUIS) 5 MG TABS tablet Take 1 tablet by mouth 2 times daily 180 tablet 1    levothyroxine (SYNTHROID) 25 MCG tablet Take 1 tablet by mouth Daily 90 tablet 1    acetaminophen (TYLENOL) 500 MG tablet Take 500 mg by mouth every 6 hours as needed for Pain      nitroGLYCERIN (NITROSTAT) 0.4 MG SL tablet Place 1 tablet under the tongue every 5 minutes as needed for Chest pain 25 tablet 3    multivitamin (THERAGRAN) per tablet Take 1 tablet by mouth daily.  albuterol (PROVENTIL;VENTOLIN) 90 MCG/ACT inhaler Inhale 2 puffs into the lungs 4 times daily as needed        No current facility-administered medications for this visit.         ALLERGIES  Allergies   Allergen Reactions    Sulfa Antibiotics Other (See Comments)     Cannot remember; rash per paper chart    Zoloft [Sertraline Hcl] Other (See Comments)     Shakes          PHYSICAL EXAM    BP (!) 142/74   Pulse 57   Temp 97.2 °F (36.2 °C)   Ht 4' 11\" (1.499 m)   Wt 160 lb (72.6 kg)   SpO2 (!) 82%   BMI 32.32 kg/m²     Constitutional:  Well developed, well nourished  HEENT:  Normocephalic, atraumatic, bilateral external ears normal, oropharynx moist, nose normal  Neck:  Normal range of motion, no tenderness, supple  Lymphatic:  No lymphadenopathy noted  Cardiovascular:  Normal heart rate, S1S2 nl  Thorax & Lungs:  Normal breath sounds, no respiratory distress, no wheezing  Skin:  Warm, dry, no erythema, no rash  Back: Palpable pain left side paraspinal T9-L2  Extremities:  No edema, no tenderness, no cyanosis  Musculoskeletal:  Good range of motion   Neurologic:  Alert & oriented X

## 2020-05-28 RX ORDER — FAMOTIDINE 20 MG/1
20 TABLET, FILM COATED ORAL 2 TIMES DAILY PRN
Qty: 180 TABLET | Refills: 0 | Status: SHIPPED | OUTPATIENT
Start: 2020-05-28 | End: 2020-12-02

## 2020-06-08 ENCOUNTER — PATIENT MESSAGE (OUTPATIENT)
Dept: FAMILY MEDICINE CLINIC | Age: 85
End: 2020-06-08

## 2020-06-09 ENCOUNTER — TELEPHONE (OUTPATIENT)
Dept: FAMILY MEDICINE CLINIC | Age: 85
End: 2020-06-09

## 2020-06-09 NOTE — TELEPHONE ENCOUNTER
To Dr Presley De Jesus--      Patient has a spot on right buttocks---top layer of skin has come off---- [facility] would like an verbal order to say:     Cleanse spot with soap and water----apply foam border twice per week and PRN.

## 2020-06-10 ENCOUNTER — TELEPHONE (OUTPATIENT)
Dept: FAMILY MEDICINE CLINIC | Age: 85
End: 2020-06-10

## 2020-06-26 RX ORDER — RANITIDINE 150 MG/1
TABLET ORAL
Qty: 180 TABLET | Refills: 0 | Status: SHIPPED | OUTPATIENT
Start: 2020-06-26 | End: 2020-08-05

## 2020-06-26 NOTE — TELEPHONE ENCOUNTER
Requested Prescriptions     Signed Prescriptions Disp Refills    raNITIdine (ZANTAC) 150 MG tablet 180 tablet 0     Sig: TAKE 1 TABLET BY MOUTH TWICE A DAY     Authorizing Provider: Pipo Gracia     Ordering User: Ever Alvarado

## 2020-07-01 ENCOUNTER — TELEPHONE (OUTPATIENT)
Dept: FAMILY MEDICINE CLINIC | Age: 85
End: 2020-07-01

## 2020-07-31 RX ORDER — ATORVASTATIN CALCIUM 10 MG/1
TABLET, FILM COATED ORAL
Qty: 90 TABLET | Refills: 1 | OUTPATIENT
Start: 2020-07-31

## 2020-08-05 ENCOUNTER — VIRTUAL VISIT (OUTPATIENT)
Dept: FAMILY MEDICINE CLINIC | Age: 85
End: 2020-08-05
Payer: MEDICARE

## 2020-08-05 PROCEDURE — 99442 PR PHYS/QHP TELEPHONE EVALUATION 11-20 MIN: CPT | Performed by: FAMILY MEDICINE

## 2020-08-05 RX ORDER — ALBUTEROL SULFATE 90 UG/1
2 AEROSOL, METERED RESPIRATORY (INHALATION) 4 TIMES DAILY PRN
Qty: 3 INHALER | Refills: 1 | Status: SHIPPED | OUTPATIENT
Start: 2020-08-05 | End: 2020-12-02

## 2020-08-05 RX ORDER — MONTELUKAST SODIUM 10 MG/1
10 TABLET ORAL DAILY
Qty: 90 TABLET | Refills: 1 | Status: SHIPPED | OUTPATIENT
Start: 2020-08-05 | End: 2020-12-02

## 2020-08-05 RX ORDER — LEVOTHYROXINE SODIUM 0.03 MG/1
25 TABLET ORAL DAILY
Qty: 90 TABLET | Refills: 1 | Status: SHIPPED | OUTPATIENT
Start: 2020-08-05 | End: 2021-03-23

## 2020-08-05 RX ORDER — ATORVASTATIN CALCIUM 10 MG/1
10 TABLET, FILM COATED ORAL DAILY
Qty: 90 TABLET | Refills: 1 | Status: SHIPPED | OUTPATIENT
Start: 2020-08-05 | End: 2021-02-02

## 2020-08-05 RX ORDER — CARVEDILOL 6.25 MG/1
6.25 TABLET ORAL 2 TIMES DAILY
Qty: 180 TABLET | Refills: 1 | Status: SHIPPED | OUTPATIENT
Start: 2020-08-05 | End: 2021-03-02

## 2020-12-01 PROBLEM — I25.2 HX OF NON-ST ELEVATION MYOCARDIAL INFARCTION (NSTEMI): Status: ACTIVE | Noted: 2020-12-01

## 2020-12-01 PROBLEM — Z86.73 HISTORY OF CVA (CEREBROVASCULAR ACCIDENT): Status: ACTIVE | Noted: 2020-12-01

## 2020-12-02 ENCOUNTER — OFFICE VISIT (OUTPATIENT)
Dept: FAMILY MEDICINE CLINIC | Age: 85
End: 2020-12-02
Payer: MEDICARE

## 2020-12-02 ENCOUNTER — VIRTUAL VISIT (OUTPATIENT)
Dept: FAMILY MEDICINE CLINIC | Age: 85
End: 2020-12-02
Payer: MEDICARE

## 2020-12-02 VITALS
TEMPERATURE: 97.1 F | BODY MASS INDEX: 32.25 KG/M2 | DIASTOLIC BLOOD PRESSURE: 82 MMHG | HEART RATE: 68 BPM | HEIGHT: 59 IN | WEIGHT: 160 LBS | SYSTOLIC BLOOD PRESSURE: 120 MMHG

## 2020-12-02 VITALS
HEART RATE: 68 BPM | SYSTOLIC BLOOD PRESSURE: 120 MMHG | OXYGEN SATURATION: 82 % | DIASTOLIC BLOOD PRESSURE: 82 MMHG | TEMPERATURE: 97.1 F | HEIGHT: 59 IN | WEIGHT: 160 LBS | BODY MASS INDEX: 32.25 KG/M2

## 2020-12-02 DIAGNOSIS — R41.3 MEMORY LOSS: ICD-10-CM

## 2020-12-02 DIAGNOSIS — E03.9 ACQUIRED HYPOTHYROIDISM: ICD-10-CM

## 2020-12-02 DIAGNOSIS — I10 ESSENTIAL HYPERTENSION: ICD-10-CM

## 2020-12-02 PROCEDURE — 1036F TOBACCO NON-USER: CPT | Performed by: FAMILY MEDICINE

## 2020-12-02 PROCEDURE — 3023F SPIROM DOC REV: CPT | Performed by: FAMILY MEDICINE

## 2020-12-02 PROCEDURE — 99214 OFFICE O/P EST MOD 30 MIN: CPT | Performed by: FAMILY MEDICINE

## 2020-12-02 PROCEDURE — 4040F PNEUMOC VAC/ADMIN/RCVD: CPT | Performed by: FAMILY MEDICINE

## 2020-12-02 PROCEDURE — 1123F ACP DISCUSS/DSCN MKR DOCD: CPT | Performed by: FAMILY MEDICINE

## 2020-12-02 PROCEDURE — G8417 CALC BMI ABV UP PARAM F/U: HCPCS | Performed by: FAMILY MEDICINE

## 2020-12-02 PROCEDURE — G0438 PPPS, INITIAL VISIT: HCPCS | Performed by: FAMILY MEDICINE

## 2020-12-02 PROCEDURE — G8926 SPIRO NO PERF OR DOC: HCPCS | Performed by: FAMILY MEDICINE

## 2020-12-02 PROCEDURE — G8427 DOCREV CUR MEDS BY ELIG CLIN: HCPCS | Performed by: FAMILY MEDICINE

## 2020-12-02 PROCEDURE — G8484 FLU IMMUNIZE NO ADMIN: HCPCS | Performed by: FAMILY MEDICINE

## 2020-12-02 PROCEDURE — 1090F PRES/ABSN URINE INCON ASSESS: CPT | Performed by: FAMILY MEDICINE

## 2020-12-02 ASSESSMENT — PATIENT HEALTH QUESTIONNAIRE - PHQ9
SUM OF ALL RESPONSES TO PHQ QUESTIONS 1-9: 0
2. FEELING DOWN, DEPRESSED OR HOPELESS: 0
SUM OF ALL RESPONSES TO PHQ9 QUESTIONS 1 & 2: 0
SUM OF ALL RESPONSES TO PHQ QUESTIONS 1-9: 0
1. LITTLE INTEREST OR PLEASURE IN DOING THINGS: 0
SUM OF ALL RESPONSES TO PHQ QUESTIONS 1-9: 0

## 2020-12-02 ASSESSMENT — LIFESTYLE VARIABLES: HOW OFTEN DO YOU HAVE A DRINK CONTAINING ALCOHOL: 0

## 2020-12-02 NOTE — PATIENT INSTRUCTIONS
Personalized Preventive Plan for Noah Mcgee - 12/2/2020  Medicare offers a range of preventive health benefits. Some of the tests and screenings are paid in full while other may be subject to a deductible, co-insurance, and/or copay. Some of these benefits include a comprehensive review of your medical history including lifestyle, illnesses that may run in your family, and various assessments and screenings as appropriate. After reviewing your medical record and screening and assessments performed today your provider may have ordered immunizations, labs, imaging, and/or referrals for you. A list of these orders (if applicable) as well as your Preventive Care list are included within your After Visit Summary for your review. Other Preventive Recommendations:    · A preventive eye exam performed by an eye specialist is recommended every 1-2 years to screen for glaucoma; cataracts, macular degeneration, and other eye disorders. · A preventive dental visit is recommended every 6 months. · Try to get at least 150 minutes of exercise per week or 10,000 steps per day on a pedometer . · Order or download the FREE \"Exercise & Physical Activity: Your Everyday Guide\" from [employer]. Call 6-146.397.7824 or search [employer] online. · You need 3122-9513 mg of calcium and 4787-1014 IU of vitamin D per day. It is possible to meet your calcium requirement with diet alone, but a vitamin D supplement is usually necessary to meet this goal.  · When exposed to the sun, use a sunscreen that protects against both UVA and UVB radiation with an SPF of 30 or greater. Reapply every 2 to 3 hours or after sweating, drying off with a towel, or swimming. · Always wear a seat belt when traveling in a car. Always wear a helmet when riding a bicycle or motorcycle.

## 2020-12-02 NOTE — PROGRESS NOTES
12/2/2020    Marisela Smalls    Chief Complaint   Patient presents with    Other     4 mth f/u    Other     no complaints       HPI    Krystina Valderrama is a 80 y.o. female who presents today with follow-up. Daughter is concerned about thick congestion from mom's chest and wheezing. She does get benefit from an albuterol HFA but daughter is concerned that she does not get a good exposure to the medication each time. Patient is treated for COPD but has never smoked therefore likely more asthmatic component. Marisela Smalls was evaluated today and a DME order was entered for a nebulizer compressor in order to administer Albuterol due to the diagnosis of COPD. The need for this equipment and treatment was discussed with the patient and she understands and is in agreement. Patient is without chest pain. There are no anginal symptoms currently. Patient notes compliance with current medications and denies side effects. Patient denies heart racing and running. Her shortness of breath does not appear to be heart rate related. It has more concerns for COPD. Patient notes compliance with medications and denies side effects. REVIEW OF SYSTEMS    Constitutional:  Denies fever, chills, weight loss or weakness  Eyes:  no photophobia or discharge  ENT:  no sore throat or ear pain  Cardiovascular:  Denies chest pain, palpitations or swelling  Respiratory: See history   GI:  no abdominal pain, nausea, vomiting, or diarrhea  Musculoskeletal:  no back pain  Skin:  No rashes  Neurologic:  no headache, focal weakness, or sensory changes  Endocrine:  no polyuria or polydipsia      PAST MEDICAL HISTORY  Past Medical History:   Diagnosis Date    A-fib ([address]     Bronchospasm     Essential hypertension     Family history of coronary artery disease     GERD (gastroesophageal reflux disease)     H/O cardiovascular stress test 08/13/2013    EF 70%, no ischemia, normal perfusion pattern in all regions, normal study.  H/O echocardiogram 06/05/2019    EF 43-60%, Grade I diastolic dysfunction, Mild septal wall asymmetrical left ventricular hypertophy, sclerotic but non stenotic aortic vavle, mitral annular calcification, Mild AR,     Headache     History of PTCA 1995,10/7/05,10/25/05    LAD,RCAX2 Ramus    Hx of cardiovascular stress test     11/12/2009=normal perfusion EF 70%, pt c/o 6/10 chest pain during adenosine infusion;  03/2008=EF 65%; 02/2006=EF 70%; 05/2005; 08/2001;10/1997; 09/1996    Hx of echocardiogram 8/13/13,11/12/09,05/2006,08/2001,10/1997    8/13-EF 50-55% essentially normal study. 11/09=EF>55%, mild MR    Hyperlipidemia     Obesity     Paroxysmal atrial fibrillation ([facility] [address] 1/13/2016    Postmenopausal state     Restrictive lung disease     VHD (valvular heart disease)        FAMILY HISTORY  Family History   Problem Relation Age of Onset    Heart Failure Mother     Heart Disease Father     Heart Disease Sister     Heart Disease Sister     Heart Surgery Sister     Rheum Arthritis Sister     Colon Cancer Other        SOCIAL HISTORY  Social History     Socioeconomic History    Marital status:       Spouse name: Not on file    Number of children: 11    Years of education: Not on file    Highest education level: Not on file   Occupational History    Not on file   Social Needs    Financial resource strain: Not on file    Food insecurity     Worry: Not on file     Inability: Not on file    Transportation needs     Medical: Not on file     Non-medical: Not on file   Tobacco Use    Smoking status: Never Smoker    Smokeless tobacco: Never Used   Substance and Sexual Activity    Alcohol use: No     Alcohol/week: 0.0 standard drinks    Drug use: No    Sexual activity: Not on file     Comment:    Lifestyle    Physical activity     Days per week: Not on file     Minutes per session: Not on file    Stress: Not on file   Relationships    Social connections     Talks on phone: Not on

## 2020-12-02 NOTE — PROGRESS NOTES
Medicare Annual Wellness Visit  Name: Jailene Haro Date: 2020   MRN: [id number] Sex: Female   Age: 80 y.o. Ethnicity: Non-/Non    : 1933 Race: Ella Rodríguez is here for Medicare AWV    Screenings for behavioral, psychosocial and functional/safety risks, and cognitive dysfunction are all negative except as indicated below. These results, as well as other patient data from the [address] form, are documented in Flowsheets linked to this Encounter. Allergies   Allergen Reactions    Sulfa Antibiotics Other (See Comments)     Cannot remember; rash per paper chart    Zoloft [Sertraline Hcl] Other (See Comments)     Shakes          Prior to Visit Medications    Medication Sig Taking? Authorizing Provider   apixaban (ELIQUIS) 5 MG TABS tablet Take 5 mg by mouth 2 times daily Yes Historical Provider, MD   albuterol (PROVENTIL) (5 MG/ML) 0.5% nebulizer solution Take 1 mL by nebulization 4 times daily as needed for Wheezing Yes Ramón Edwards MD   carvedilol (COREG) 6.25 MG tablet Take 1 tablet by mouth 2 times daily Yes Ramón Edwards MD   levothyroxine (SYNTHROID) 25 MCG tablet Take 1 tablet by mouth Daily Yes Ramón Edwards MD   atorvastatin (LIPITOR) 10 MG tablet Take 1 tablet by mouth daily Yes Ramón Edwards MD   fluticasone-salmeterol (Elvera Overall) 250-50 MCG/DOSE AEPB INHALE 1 PUFF INTO THE LUNGS EVERY 15 HOURS Yes Ramón Edwards MD   aspirin 81 MG tablet Take 81 mg by mouth daily Yes Historical Provider, MD   acetaminophen (TYLENOL) 500 MG tablet Take 500 mg by mouth every 6 hours as needed for Pain Yes Historical Provider, MD   multivitamin [facility]) per tablet Take 1 tablet by mouth daily.    Yes Historical Provider, MD   albuterol (PROVENTIL;VENTOLIN) 90 MCG/ACT inhaler Inhale 2 puffs into the lungs 4 times daily as needed  Yes Historical Provider, MD   nitroGLYCERIN (NITROSTAT) 0.4 MG SL tablet Place 1 tablet under the tongue every 5 minutes as needed for Chest pain  Ross Garcia MD       Past Medical History:   Diagnosis Date    A-fib [facility])     Bronchospasm     Essential hypertension     Family history of coronary artery disease     GERD (gastroesophageal reflux disease)     H/O cardiovascular stress test 08/13/2013    EF 70%, no ischemia, normal perfusion pattern in all regions, normal study.  H/O echocardiogram 06/05/2019    EF 95-40%, Grade I diastolic dysfunction, Mild septal wall asymmetrical left ventricular hypertophy, sclerotic but non stenotic aortic vavle, mitral annular calcification, Mild AR,     Headache     History of PTCA 1995,10/7/05,10/25/05    LAD,RCAX2 Ramus    Hx of cardiovascular stress test     11/12/2009=normal perfusion EF 70%, pt c/o 6/10 chest pain during adenosine infusion;  03/2008=EF 65%; 02/2006=EF 70%; 05/2005; 08/2001;10/1997; 09/1996    Hx of echocardiogram 8/13/13,11/12/09,05/2006,08/2001,10/1997    8/13-EF 50-55% essentially normal study.  11/09=EF>55%, mild MR    Hyperlipidemia     Obesity     Paroxysmal atrial fibrillation ([address] 1/13/2016    Postmenopausal state     Restrictive lung disease     VHD (valvular heart disease)        Past Surgical History:   Procedure Laterality Date    CATARACT REMOVAL WITH IMPLANT Bilateral 2006    CHOLECYSTECTOMY      CORONARY ANGIOPLASTY  2005, 1995    10/25/2005=ramus 2.5x15mm taxus and 2.5x8mm ostium and proximal portion of the ramus; PTCA LAD in [address]  07/2016    x 2    THORACENTESIS Left 08/15/2016    525 ml s/s       Family History   Problem Relation Age of Onset    Heart Failure Mother     Heart Disease Father     Heart Disease Sister     Heart Disease Sister     Heart Surgery Sister     Rheum Arthritis Sister     Colon Cancer Other        CareTeam (Including outside providers/suppliers regularly involved in providing care):   Patient Care Team:  [address] Israel Delgado MD as PCP - Vandana Garcia MD as PCP - [facility] Kavin Mcdonald Provider  Ata Yoder MD as Consulting Physician (Cardiology)    Wt Readings from Last 3 Encounters:   12/02/20 160 lb (72.6 kg)   12/02/20 160 lb (72.6 kg)   05/21/20 160 lb (72.6 kg)     Vitals:    12/02/20 1530   BP: 120/82   Pulse: 68   Temp: 97.1 °F (36.2 °C)   SpO2: (!) 82%   Weight: 160 lb (72.6 kg)   Height: 4' 11\" (1.499 m)     Body mass index is 32.32 kg/m². Patient was seen by PCP today and a nebulizer was ordered for the patient. Based upon direct observation of the patient, evaluation of cognition reveals recent and remote memory intact. Patient's complete Health Risk Assessment and screening values have been reviewed and are found in Flowsheets. The following problems were reviewed today and where indicated follow up appointments were made and/or referrals ordered. Positive Risk Factor Screenings with Interventions:     Cognitive: Words recalled: 2 Words Recalled  Total Score Interpretation: Positive Mini-Cog  Did the patient refuse to take the cognition test?: No  Cognitive Impairment Interventions:  · Patient declines any further evaluation/treatment for cognitive impairment   · Patient did not sound as if she was cognitively impaired    General Health and ACP:  General  In general, how would you say your health is?: Fair  In the past 7 days, have you experienced any of the following?  New or Increased Pain, New or Increased Fatigue, Loneliness, Social Isolation, Stress or Anger?: None of These  Do you get the social and emotional support that you need?: Yes  Do you have a Living Will?: Yes  Advance Directives     Power of  Living Will ACP-Advance Directive ACP-Power of     Not on File Not on File Filed [address] Risk Interventions:  · No Living Will: ACP documents already completed- patient asked to provide copy to the office    Health Habits/Nutrition:  Health Habits/Nutrition  Do you exercise for at least 20 minutes 2-3 times per week?: (!) No  Have you lost any weight without trying in the past 3 months?: No  Do you eat fewer than 2 meals per day?: No  Have you seen a dentist within the past year?: (!) No  Body mass index: (!) 32.31  Health Habits/Nutrition Interventions:  · Inadequate physical activity:  patient is not ready to increase his/her physical activity level at this time  · Nutritional issues:  patient is not ready to address his/her nutritional/weight issues at this time  · Dental exam overdue:  patient declines dental evaluation stating that she wears dentures    Hearing/Vision:  No exam data present  Hearing/Vision  Do you or your family notice any trouble with your hearing?: (!) Yes  Do you have difficulty driving, watching TV, or doing any of your daily activities because of your eyesight?: No  Have you had an eye exam within the past year?: (!) No  Hearing/Vision Interventions:  · Hearing concerns:  patient declines any further evaluation/treatment for hearing issues  · Vision concerns:  patient encouraged to make appointment with his/her eye specialist   · Patient stated that she does wear hearing aids    ADL:  ADLs  In the past 7 days, did you need help from others to perform any of the following everyday activities? Eating, dressing, grooming, bathing, toileting, or walking/balance?: (!) Bathing, Dressing  In the past 7 days, did you need help from others to take care of any of the following?  Laundry, housekeeping, banking/finances, shopping, telephone use, food preparation, transportation, or taking medications?: [employer], Housekeeping, Food Preparation, Transportation, Shopping  ADL Interventions:  · Patient declines any further evaluation/treatment for this issue stating that her daughters take care of her ADLs    Personalized Preventive Plan   Current Health Maintenance Status  Immunization History   Administered Date(s) Administered   [facility] Influenza Virus Vaccine 10/10/2020    Influenza, High Dose (Fluzone 65 yrs and older) 09/25/2015, 11/09/2016, 09/19/2018, 10/17/2019    Pneumococcal Conjugate 13-valent ([id number]) 02/25/2015    Pneumococcal Polysaccharide ([id number]) 11/15/2005    Tdap (Boostrix, Adacel) 12/19/2013        Health Maintenance   Topic Date Due    Shingles Vaccine (1 of 2) 04/17/1983    Lipid screen  03/21/2019    Annual Wellness Visit (AWV)  06/21/2019    TSH testing  02/05/2021    Potassium monitoring  05/15/2021    Creatinine monitoring  05/15/2021    DTaP/Tdap/Td vaccine (2 - Td) 12/19/2023    Flu vaccine  Completed    Pneumococcal 65+ years Vaccine  Completed    Hepatitis A vaccine  Aged Out    Hepatitis B vaccine  Aged Out    Hib vaccine  Aged Out    Meningococcal (ACWY) vaccine  Aged Out     Recommendations for [employer] Due: see orders and patient instructions/AVS.  . Recommended screening schedule for the next 5-10 years is provided to the patient in written form: see Patient Instructions/AVS    Vu Larsen is a 80 y.o. female being evaluated by a Virtual Visit (audio visit) encounter to address concerns as mentioned above. A caregiver was present when appropriate. Due to this being a TeleHealth encounter (During [address] emergency), evaluation of the following organ systems was limited: Vitals/Constitutional/EENT/Resp/CV/GI//MS/Neuro/Skin/Heme-Lymph-Imm. Pursuant to the emergency declaration under the [address] [place] [address] authority and the [employer] and [employer] Act, this Virtual Visit was conducted with patient's (and/or legal guardian's) consent, to reduce the patient's risk of exposure to COVID-19 and provide necessary medical care.   The patient (and/or legal guardian) has also been advised to contact this office for worsening conditions or problems, and seek emergency medical treatment and/or call 911 if deemed necessary. Patient identification was verified at the start of the visit: Yes    Total time spent for this encounter: Not billed by time    Services were provided through a audio synchronous discussion virtually to substitute for in-person clinic visit. Patient and provider were located at their individual homes. --Gagan Peace on 12/2/2020 at 3:37 PM    An electronic signature was used to authenticate this note.'      Gagan COX, 12/2/2020, performed the documented evaluation under the direct supervision of the attending physician. This encounter was performed under Ross nolen MDs, direct supervision, 12/2/2020.

## 2020-12-03 LAB
ANION GAP SERPL CALCULATED.3IONS-SCNC: 19 MMOL/L (ref 3–16)
BUN BLDV-MCNC: 18 MG/DL (ref 7–20)
CALCIUM SERPL-MCNC: 10.4 MG/DL (ref 8.3–10.6)
CHLORIDE BLD-SCNC: 99 MMOL/L (ref 99–110)
CO2: 20 MMOL/L (ref 21–32)
CREAT SERPL-MCNC: 0.9 MG/DL (ref 0.6–1.2)
GFR AFRICAN AMERICAN: >60
GFR NON-AFRICAN AMERICAN: 59
GLUCOSE BLD-MCNC: 82 MG/DL (ref 70–99)
POTASSIUM SERPL-SCNC: 5 MMOL/L (ref 3.5–5.1)
SODIUM BLD-SCNC: 138 MMOL/L (ref 136–145)
TSH REFLEX FT4: 2.18 UIU/ML (ref 0.27–4.2)
VITAMIN B-12: 1151 PG/ML (ref 211–911)

## 2020-12-11 ENCOUNTER — TELEPHONE (OUTPATIENT)
Dept: FAMILY MEDICINE CLINIC | Age: 85
End: 2020-12-11

## 2021-02-02 DIAGNOSIS — I25.810 CORONARY ARTERY DISEASE INVOLVING CORONARY BYPASS GRAFT OF NATIVE HEART WITHOUT ANGINA PECTORIS: ICD-10-CM

## 2021-02-02 RX ORDER — ATORVASTATIN CALCIUM 10 MG/1
TABLET, FILM COATED ORAL
Qty: 90 TABLET | Refills: 1 | Status: SHIPPED | OUTPATIENT
Start: 2021-02-02 | End: 2021-05-13 | Stop reason: SDUPTHER

## 2021-02-02 NOTE — TELEPHONE ENCOUNTER
Requested Prescriptions     Signed Prescriptions Disp Refills    atorvastatin (LIPITOR) 10 MG tablet 90 tablet 1     Sig: TAKE 1 TABLET BY MOUTH EVERY DAY     Authorizing Provider: Timothy Myrick     Ordering User: Ramírez Starr

## 2021-03-08 RX ORDER — APIXABAN 5 MG/1
TABLET, FILM COATED ORAL
Qty: 180 TABLET | Refills: 0 | Status: SHIPPED | OUTPATIENT
Start: 2021-03-08 | End: 2021-05-13 | Stop reason: SDUPTHER

## 2021-03-08 NOTE — TELEPHONE ENCOUNTER
Requested Prescriptions     Signed Prescriptions Disp Refills    ELIQUIS 5 MG TABS tablet 180 tablet 0     Sig: TAKE 1 TABLET BY MOUTH TWICE A DAY     Authorizing Provider: Shawn Pinon     Ordering User: Maurilio Valles

## 2021-03-16 DIAGNOSIS — J98.4 RESTRICTIVE LUNG DISEASE: ICD-10-CM

## 2021-03-16 NOTE — TELEPHONE ENCOUNTER
Requested Prescriptions     Signed Prescriptions Disp Refills    fluticasone-salmeterol (WIXELA INHUB) 250-50 MCG/DOSE AEPB 3 Inhaler 0     Sig: INHALE 1 PUFF INTO THE LUNGS EVERY 12 HOURS     Authorizing Provider: Diane Mcfarland     Ordering User: Jens Gallegos

## 2021-03-22 DIAGNOSIS — E03.9 ACQUIRED HYPOTHYROIDISM: ICD-10-CM

## 2021-03-23 RX ORDER — LEVOTHYROXINE SODIUM 0.03 MG/1
TABLET ORAL
Qty: 90 TABLET | Refills: 0 | Status: SHIPPED | OUTPATIENT
Start: 2021-03-23 | End: 2021-05-13 | Stop reason: SDUPTHER

## 2021-03-23 NOTE — TELEPHONE ENCOUNTER
Requested Prescriptions     Signed Prescriptions Disp Refills    levothyroxine (SYNTHROID) 25 MCG tablet 90 tablet 0     Sig: TAKE 1 TABLET BY MOUTH EVERY DAY     Authorizing Provider: Carolin Perales     Ordering User: Jersey Perdomo

## 2021-05-13 ENCOUNTER — APPOINTMENT (OUTPATIENT)
Dept: CT IMAGING | Age: 86
DRG: 280 | End: 2021-05-13
Payer: MEDICARE

## 2021-05-13 ENCOUNTER — OFFICE VISIT (OUTPATIENT)
Dept: FAMILY MEDICINE CLINIC | Age: 86
End: 2021-05-13

## 2021-05-13 ENCOUNTER — APPOINTMENT (OUTPATIENT)
Dept: GENERAL RADIOLOGY | Age: 86
DRG: 280 | End: 2021-05-13
Payer: MEDICARE

## 2021-05-13 ENCOUNTER — HOSPITAL ENCOUNTER (INPATIENT)
Age: 86
LOS: 3 days | Discharge: HOME | DRG: 280 | End: 2021-05-16
Attending: EMERGENCY MEDICINE | Admitting: INTERNAL MEDICINE
Payer: MEDICARE

## 2021-05-13 VITALS
HEART RATE: 80 BPM | BODY MASS INDEX: 31.25 KG/M2 | DIASTOLIC BLOOD PRESSURE: 84 MMHG | WEIGHT: 155 LBS | OXYGEN SATURATION: 91 % | SYSTOLIC BLOOD PRESSURE: 132 MMHG | HEIGHT: 59 IN

## 2021-05-13 DIAGNOSIS — R06.03 RESPIRATORY DISTRESS: ICD-10-CM

## 2021-05-13 DIAGNOSIS — I10 ESSENTIAL HYPERTENSION: ICD-10-CM

## 2021-05-13 DIAGNOSIS — J44.9 CHRONIC OBSTRUCTIVE PULMONARY DISEASE, UNSPECIFIED COPD TYPE (HCC): ICD-10-CM

## 2021-05-13 DIAGNOSIS — I71.21 THORACIC ASCENDING AORTIC ANEURYSM: ICD-10-CM

## 2021-05-13 DIAGNOSIS — I50.9 ACUTE CONGESTIVE HEART FAILURE, UNSPECIFIED HEART FAILURE TYPE (HCC): ICD-10-CM

## 2021-05-13 DIAGNOSIS — I48.0 PAROXYSMAL ATRIAL FIBRILLATION (HCC): Primary | ICD-10-CM

## 2021-05-13 DIAGNOSIS — R77.8 ELEVATED TROPONIN: ICD-10-CM

## 2021-05-13 DIAGNOSIS — I38 VHD (VALVULAR HEART DISEASE): ICD-10-CM

## 2021-05-13 DIAGNOSIS — J98.4 RESTRICTIVE LUNG DISEASE: ICD-10-CM

## 2021-05-13 DIAGNOSIS — I71.21 ASCENDING AORTIC ANEURYSM: ICD-10-CM

## 2021-05-13 DIAGNOSIS — I50.33 ACUTE ON CHRONIC DIASTOLIC CONGESTIVE HEART FAILURE (HCC): ICD-10-CM

## 2021-05-13 DIAGNOSIS — Z98.61 HISTORY OF PTCA: ICD-10-CM

## 2021-05-13 DIAGNOSIS — R06.02 SOB (SHORTNESS OF BREATH): ICD-10-CM

## 2021-05-13 DIAGNOSIS — I25.810 CORONARY ARTERY DISEASE INVOLVING CORONARY BYPASS GRAFT OF NATIVE HEART WITHOUT ANGINA PECTORIS: ICD-10-CM

## 2021-05-13 DIAGNOSIS — J18.9 PNEUMONIA OF BOTH LUNGS DUE TO INFECTIOUS ORGANISM, UNSPECIFIED PART OF LUNG: Primary | ICD-10-CM

## 2021-05-13 DIAGNOSIS — E03.9 ACQUIRED HYPOTHYROIDISM: ICD-10-CM

## 2021-05-13 DIAGNOSIS — A41.9 SEPTICEMIA (HCC): ICD-10-CM

## 2021-05-13 LAB
ABO/RH: NORMAL
ALBUMIN SERPL-MCNC: 4 GM/DL (ref 3.4–5)
ALP BLD-CCNC: 97 IU/L (ref 40–128)
ALT SERPL-CCNC: 13 U/L (ref 10–40)
ANION GAP SERPL CALCULATED.3IONS-SCNC: 11 MMOL/L (ref 4–16)
ANTIBODY SCREEN: NEGATIVE
APTT: 32.5 SECONDS (ref 25.1–37.1)
AST SERPL-CCNC: 17 IU/L (ref 15–37)
BASE EXCESS: 5 (ref 0–2.4)
BILIRUB SERPL-MCNC: 0.2 MG/DL (ref 0–1)
BUN BLDV-MCNC: 22 MG/DL (ref 6–23)
CALCIUM SERPL-MCNC: 9.9 MG/DL (ref 8.3–10.6)
CHLORIDE BLD-SCNC: 98 MMOL/L (ref 99–110)
CO2: 26 MMOL/L (ref 21–32)
COMMENT: ABNORMAL
CREAT SERPL-MCNC: 1 MG/DL (ref 0.6–1.1)
D DIMER: 402 NG/ML(DDU)
DIFFERENTIAL TYPE: ABNORMAL
GFR AFRICAN AMERICAN: >60 ML/MIN/1.73M2
GFR NON-AFRICAN AMERICAN: 52 ML/MIN/1.73M2
GLUCOSE BLD-MCNC: 231 MG/DL (ref 70–99)
HCO3 VENOUS: 26.3 MMOL/L (ref 19–25)
HCT VFR BLD CALC: 40.8 % (ref 37–47)
HEMOGLOBIN: 12.7 GM/DL (ref 12.5–16)
INR BLD: 1.25 INDEX
LACTIC ACID, SEPSIS: 1.1 MMOL/L (ref 0.5–1.9)
LACTIC ACID, SEPSIS: 5.7 MMOL/L (ref 0.5–1.9)
LYMPHOCYTES ABSOLUTE: 5.8 K/CU MM
LYMPHOCYTES RELATIVE PERCENT: 61 % (ref 24–44)
MAGNESIUM: 1.8 MG/DL (ref 1.8–2.4)
MCH RBC QN AUTO: 31.7 PG (ref 27–31)
MCHC RBC AUTO-ENTMCNC: 31.1 % (ref 32–36)
MCV RBC AUTO: 101.7 FL (ref 78–100)
MONOCYTES ABSOLUTE: 0.5 K/CU MM
MONOCYTES RELATIVE PERCENT: 5 % (ref 0–4)
O2 SAT, VEN: 94.9 % (ref 50–70)
PCO2, VEN: 79 MMHG (ref 38–52)
PDW BLD-RTO: 13.3 % (ref 11.7–14.9)
PH VENOUS: 7.13 (ref 7.32–7.42)
PLATELET # BLD: 241 K/CU MM (ref 140–440)
PMV BLD AUTO: 10.5 FL (ref 7.5–11.1)
PO2, VEN: 154 MMHG (ref 28–48)
POTASSIUM SERPL-SCNC: 4.4 MMOL/L (ref 3.5–5.1)
PRO-BNP: 5131 PG/ML
PROTHROMBIN TIME: 15.1 SECONDS (ref 11.7–14.5)
RBC # BLD: 4.01 M/CU MM (ref 4.2–5.4)
SARS-COV-2, NAAT: NOT DETECTED
SEGMENTED NEUTROPHILS ABSOLUTE COUNT: 3.3 K/CU MM
SEGMENTED NEUTROPHILS RELATIVE PERCENT: 34 % (ref 36–66)
SODIUM BLD-SCNC: 135 MMOL/L (ref 135–145)
SOURCE: NORMAL
TOTAL PROTEIN: 7.4 GM/DL (ref 6.4–8.2)
TROPONIN T: 0.02 NG/ML
WBC # BLD: 9.6 K/CU MM (ref 4–10.5)

## 2021-05-13 PROCEDURE — 85730 THROMBOPLASTIN TIME PARTIAL: CPT

## 2021-05-13 PROCEDURE — 2700000000 HC OXYGEN THERAPY PER DAY

## 2021-05-13 PROCEDURE — 82805 BLOOD GASES W/O2 SATURATION: CPT

## 2021-05-13 PROCEDURE — 96365 THER/PROPH/DIAG IV INF INIT: CPT

## 2021-05-13 PROCEDURE — 0202U NFCT DS 22 TRGT SARS-COV-2: CPT

## 2021-05-13 PROCEDURE — 2580000003 HC RX 258: Performed by: EMERGENCY MEDICINE

## 2021-05-13 PROCEDURE — 80053 COMPREHEN METABOLIC PANEL: CPT

## 2021-05-13 PROCEDURE — 85610 PROTHROMBIN TIME: CPT

## 2021-05-13 PROCEDURE — 99285 EMERGENCY DEPT VISIT HI MDM: CPT

## 2021-05-13 PROCEDURE — 93005 ELECTROCARDIOGRAM TRACING: CPT | Performed by: EMERGENCY MEDICINE

## 2021-05-13 PROCEDURE — 83735 ASSAY OF MAGNESIUM: CPT

## 2021-05-13 PROCEDURE — 86850 RBC ANTIBODY SCREEN: CPT

## 2021-05-13 PROCEDURE — 87635 SARS-COV-2 COVID-19 AMP PRB: CPT

## 2021-05-13 PROCEDURE — 83880 ASSAY OF NATRIURETIC PEPTIDE: CPT

## 2021-05-13 PROCEDURE — 85379 FIBRIN DEGRADATION QUANT: CPT

## 2021-05-13 PROCEDURE — 86901 BLOOD TYPING SEROLOGIC RH(D): CPT

## 2021-05-13 PROCEDURE — 94660 CPAP INITIATION&MGMT: CPT

## 2021-05-13 PROCEDURE — 71045 X-RAY EXAM CHEST 1 VIEW: CPT

## 2021-05-13 PROCEDURE — 86900 BLOOD TYPING SEROLOGIC ABO: CPT

## 2021-05-13 PROCEDURE — 96367 TX/PROPH/DG ADDL SEQ IV INF: CPT

## 2021-05-13 PROCEDURE — 6360000002 HC RX W HCPCS: Performed by: EMERGENCY MEDICINE

## 2021-05-13 PROCEDURE — 36415 COLL VENOUS BLD VENIPUNCTURE: CPT

## 2021-05-13 PROCEDURE — 85025 COMPLETE CBC W/AUTO DIFF WBC: CPT

## 2021-05-13 PROCEDURE — 87040 BLOOD CULTURE FOR BACTERIA: CPT

## 2021-05-13 PROCEDURE — 96368 THER/DIAG CONCURRENT INF: CPT

## 2021-05-13 PROCEDURE — 84484 ASSAY OF TROPONIN QUANT: CPT

## 2021-05-13 PROCEDURE — 83605 ASSAY OF LACTIC ACID: CPT

## 2021-05-13 PROCEDURE — 2500000003 HC RX 250 WO HCPCS: Performed by: EMERGENCY MEDICINE

## 2021-05-13 PROCEDURE — 71275 CT ANGIOGRAPHY CHEST: CPT

## 2021-05-13 PROCEDURE — 2000000000 HC ICU R&B

## 2021-05-13 PROCEDURE — 6360000004 HC RX CONTRAST MEDICATION: Performed by: EMERGENCY MEDICINE

## 2021-05-13 RX ORDER — NITROGLYCERIN 20 MG/100ML
5-200 INJECTION INTRAVENOUS CONTINUOUS
Status: DISCONTINUED | OUTPATIENT
Start: 2021-05-13 | End: 2021-05-13

## 2021-05-13 RX ORDER — LEVOFLOXACIN 5 MG/ML
750 INJECTION, SOLUTION INTRAVENOUS EVERY 24 HOURS
Status: DISCONTINUED | OUTPATIENT
Start: 2021-05-13 | End: 2021-05-14

## 2021-05-13 RX ORDER — LEVOTHYROXINE SODIUM 0.03 MG/1
TABLET ORAL
Qty: 90 TABLET | Refills: 1 | Status: SHIPPED | OUTPATIENT
Start: 2021-05-13

## 2021-05-13 RX ORDER — ATORVASTATIN CALCIUM 10 MG/1
TABLET, FILM COATED ORAL
Qty: 90 TABLET | Refills: 1 | Status: ON HOLD | OUTPATIENT
Start: 2021-05-13 | End: 2021-06-20 | Stop reason: SDUPTHER

## 2021-05-13 RX ORDER — ACETAMINOPHEN 325 MG/1
650 TABLET ORAL ONCE
Status: COMPLETED | OUTPATIENT
Start: 2021-05-13 | End: 2021-05-14

## 2021-05-13 RX ORDER — SODIUM CHLORIDE 0.9 % (FLUSH) 0.9 %
10 SYRINGE (ML) INJECTION 2 TIMES DAILY
Status: DISCONTINUED | OUTPATIENT
Start: 2021-05-13 | End: 2021-05-16 | Stop reason: HOSPADM

## 2021-05-13 RX ORDER — NITROGLYCERIN 0.4 MG/1
0.4 TABLET SUBLINGUAL EVERY 5 MIN PRN
Qty: 25 TABLET | Refills: 3 | Status: SHIPPED | OUTPATIENT
Start: 2021-05-13 | End: 2022-05-13

## 2021-05-13 RX ORDER — METHYLPREDNISOLONE SODIUM SUCCINATE 125 MG/2ML
80 INJECTION, POWDER, LYOPHILIZED, FOR SOLUTION INTRAMUSCULAR; INTRAVENOUS ONCE
Status: COMPLETED | OUTPATIENT
Start: 2021-05-14 | End: 2021-05-14

## 2021-05-13 RX ORDER — CARVEDILOL 6.25 MG/1
TABLET ORAL
Qty: 180 TABLET | Refills: 1 | Status: ON HOLD | OUTPATIENT
Start: 2021-05-13 | End: 2021-05-16 | Stop reason: SDUPTHER

## 2021-05-13 RX ADMIN — CEFTRIAXONE SODIUM 2000 MG: 2 INJECTION, POWDER, FOR SOLUTION INTRAMUSCULAR; INTRAVENOUS at 19:14

## 2021-05-13 RX ADMIN — SODIUM CHLORIDE, PRESERVATIVE FREE 10 ML: 5 INJECTION INTRAVENOUS at 21:34

## 2021-05-13 RX ADMIN — NITROGLYCERIN 5 MCG/MIN: 20 INJECTION INTRAVENOUS at 17:57

## 2021-05-13 RX ADMIN — IOPAMIDOL 80 ML: 755 INJECTION, SOLUTION INTRAVENOUS at 21:33

## 2021-05-13 RX ADMIN — LEVOFLOXACIN 750 MG: 5 INJECTION, SOLUTION INTRAVENOUS at 19:14

## 2021-05-13 ASSESSMENT — PATIENT HEALTH QUESTIONNAIRE - PHQ9
SUM OF ALL RESPONSES TO PHQ QUESTIONS 1-9: 0
SUM OF ALL RESPONSES TO PHQ QUESTIONS 1-9: 0
2. FEELING DOWN, DEPRESSED OR HOPELESS: 0
SUM OF ALL RESPONSES TO PHQ9 QUESTIONS 1 & 2: 0

## 2021-05-13 ASSESSMENT — PAIN SCALES - GENERAL: PAINLEVEL_OUTOF10: 0

## 2021-05-13 NOTE — ED PROVIDER NOTES
Emergency Department Encounter    Patient: Cassi Anguiano  MRN: [id number]  : 1933  Date of Evaluation: 2021  ED Provider:  HARRY MOTT    Critical Care: There is a high probability of clinically significant and life or limb altering change in the patient's condition that requires my immediate attention and intervention. Total critical care time not including separately reportable procedures is at least 30 minutes. Triage Chief Complaint:   Shortness of Breath      [place]:  Cassi Anguiano is a 80 y.o. female that presents to the emergency department with abrupt onset of shortness of breath. Patient was at her primary care doctor's office for routine checkup. Daughter was with her, and daughter reports that it was very warm and musty. Patient had the spontaneous and rapid onset of difficulty breathing and increased work of breathing. They have noticed some increasing swelling of the lower extremities, but they do not know if there is any specific weight gain. Symptoms are severe and constant in timing. Patient does report some nonradiating discomfort across the mid chest.  Patient and daughter do report history of COPD but no home oxygen use. There is a history of congestive heart failure. Patient reports no other particular provocative or alleviating factors. Patient is able to answer yes and no questions but is otherwise not speaking, somewhat limiting history. Remainder of history is provided by the patient's daughter. ROS - see HPI, below listed is current ROS at time of my eval:  CONSTITUTIONAL: No fevers, chills, or sweats. EYES: No vision change, redness, drainage, or discharge. HENT: No sore throat, runny nose, or earache. RESPIRATORY: As above. CARDIOVASCULAR: As above. GASTROINTESTINAL: No nausea, vomiting, or abdominal pain. GENITOURINARY: No frequency, urgency, or dysuria. No hematuria. MUSCULOSKELETAL: No recent injury.   No neck, back, or extremity pain.  NEUROLOGICAL: No focal weakness, numbness, or tingling. SKIN: No rashes or other lesions reported. No yellowing of the skin. Medical history:  Past Medical History:   Diagnosis Date    A-fib ([address]     Bronchospasm     Essential hypertension     Family history of coronary artery disease     GERD (gastroesophageal reflux disease)     H/O cardiovascular stress test 08/13/2013    EF 70%, no ischemia, normal perfusion pattern in all regions, normal study.  H/O echocardiogram 06/05/2019    EF 66-36%, Grade I diastolic dysfunction, Mild septal wall asymmetrical left ventricular hypertophy, sclerotic but non stenotic aortic vavle, mitral annular calcification, Mild AR,     Headache     History of PTCA 1995,10/7/05,10/25/05    LAD,RCAX2 Ramus    Hx of cardiovascular stress test     11/12/2009=normal perfusion EF 70%, pt c/o 6/10 chest pain during adenosine infusion;  03/2008=EF 65%; 02/2006=EF 70%; 05/2005; 08/2001;10/1997; 09/1996    Hx of echocardiogram 8/13/13,11/12/09,05/2006,08/2001,10/1997    8/13-EF 50-55% essentially normal study. 11/09=EF>55%, mild MR    Hyperlipidemia     Obesity     Paroxysmal atrial fibrillation ([address] 1/13/2016    Postmenopausal state     Restrictive lung disease     VHD (valvular heart disease)      Past Surgical History:   Procedure Laterality Date    CATARACT REMOVAL WITH IMPLANT Bilateral 2006    CHOLECYSTECTOMY      CORONARY ANGIOPLASTY  2005, 1995    10/25/2005=ramus 2.5x15mm taxus and 2.5x8mm ostium and proximal portion of the ramus; PTCA LAD in [address]  07/2016    x 2    THORACENTESIS Left 08/15/2016    525 ml s/s     Family History   Problem Relation Age of Onset    Heart Failure Mother     Heart Disease Father     Heart Disease Sister     Heart Disease Sister     Heart Surgery Sister     Rheum Arthritis Sister     Colon Cancer Other      Social History     Socioeconomic History    Marital status:       Spouse name: Not on file    Number of children: 11    Years of education: Not on file    Highest education level: Not on file   Occupational History    Not on file   Social Needs    Financial resource strain: Not on file    Food insecurity     Worry: Not on file     Inability: Not on file    Transportation needs     Medical: Not on file     Non-medical: Not on file   Tobacco Use    Smoking status: Never Smoker    Smokeless tobacco: Never Used   Substance and Sexual Activity    Alcohol use: No     Alcohol/week: 0.0 standard drinks    Drug use: No    Sexual activity: Not on file     Comment:    Lifestyle    Physical activity     Days per week: Not on file     Minutes per session: Not on file    Stress: Not on file   Relationships    Social connections     Talks on phone: Not on file     Gets together: Not on file     Attends Ca[employer]ism service: Not on file     Active member of club or organization: Not on file     Attends meetings of clubs or organizations: Not on file     Relationship status: Not on file    Intimate partner violence     Fear of current or ex partner: Not on file     Emotionally abused: Not on file     Physically abused: Not on file     Forced sexual activity: Not on file   Other Topics Concern    Not on file   Social History Narrative    Not on file     Current Facility-Administered Medications   Medication Dose Route Frequency Provider Last Rate Last Admin    levoFLOXacin (LEVAQUIN) 750 MG/150ML infusion 750 mg  750 mg Intravenous Q24H Darryl Burt MD   Stopped at 05/13/21 2042    sodium chloride flush 0.9 % injection 10 mL  10 mL Intravenous BID Darryl Burt MD   10 mL at 05/13/21 2134     Current Outpatient Medications   Medication Sig Dispense Refill    [facility] ESOMEPRAZOLE MAGNESIUM PO Take 20 mg by mouth daily      levothyroxine (SYNTHROID) 25 MCG tablet TAKE 1 TABLET BY MOUTH EVERY DAY 90 tablet 1    fluticasone-salmeterol (WIXELA INHUB) 250-50 MCG/DOSE AEPB INHALE 1 PUFF INTO THE LUNGS EVERY 12 HOURS 3 Inhaler 1    apixaban (ELIQUIS) 5 MG TABS tablet TAKE 1 TABLET BY MOUTH TWICE A  tablet 1    carvedilol (COREG) 6.25 MG tablet TAKE 1 TABLET BY MOUTH TWICE A  tablet 1    atorvastatin (LIPITOR) 10 MG tablet TAKE 1 TABLET BY MOUTH EVERY DAY 90 tablet 1    albuterol (PROVENTIL) (5 MG/ML) 0.5% nebulizer solution Take 1 mL by nebulization 4 times daily as needed for Wheezing 120 each 5    nitroGLYCERIN (NITROSTAT) 0.4 MG SL tablet Place 1 tablet under the tongue every 5 minutes as needed for Chest pain 25 tablet 3    aspirin 81 MG tablet Take 81 mg by mouth daily      acetaminophen (TYLENOL) 500 MG tablet Take 500 mg by mouth every 6 hours as needed for Pain      multivitamin (THERAGRAN) per tablet Take 1 tablet by mouth daily.  albuterol (PROVENTIL;VENTOLIN) 90 MCG/ACT inhaler Inhale 2 puffs into the lungs 4 times daily as needed        Allergies   Allergen Reactions    Sulfa Antibiotics Other (See Comments)     Cannot remember; rash per paper chart    Zoloft [Sertraline Hcl] Other (See Comments)     [place]          Nursing Notes Reviewed    Physical Exam:  Triage VS:    ED Triage Vitals   Enc Vitals Group      BP       Pulse       Resp       Temp       Temp src       SpO2       Weight       Height       Head Circumference       Peak Flow       Pain Score       Pain Loc       Pain Edu? Excl. in [address]? My pulse ox interpretation is -96% on a nonrebreather    GENERAL: Patient is awake, alert, and answering yes/no questions appropriately. Markedly increased work of breathing. Patient is not speaking but answering yes or no questions appropriately. HEENT: Normocephalic and atraumatic. Pupils equal, round, and reactive to light. No redness or matting. Bilateral external ears are unremarkable. .  Nasal mucosa is pink without purulence. Oral mucosa is mildly dry. NECK: Supple with normal range of motion. Short neck with no visible JVD.     RESPIRATORY: Moderately diminished aeration throughout. Faint end expiratory wheezes noted anteriorly. Crackles throughout anteriorly. Chest wall stable and nontender. CARDIOVASCULAR: Regular tachycardia. No murmurs, rubs, or gallops. No central or peripheral cyanosis. GASTROINTESTINAL: Soft, nontender, and nondistended. No McBurney's or Meek's point tenderness. No other focal tenderness. No involuntary guarding, rebound, or rigidity. No mass or pulsatile mass. Bowel sounds normal signs. NEUROLOGICAL: Awake, alert and oriented x 3. Cranial nerves III through XII are grossly intact as tested without facial droop or dermatomal paresthesias. Of note, forehead wrinkles are symmetric and intact. Conjugate gaze without entrapment. No asymmetry of the corners of the mouth or nasolabial folds. No gross motor or cerebellar deficits. MUSCULOSKELETAL: 1 or 2+ lightly pitting, symmetric edema with Homans' sign, or cords. SKIN: Cool, mildly diaphoretic. No petechiae, purpura, vesicles, bullae, or other lesions. No icterus. Emergency department course. Patient is brought to bed 34 and assessed and reassessed by me. After initial evaluation, orders are placed for CBC, metabolic panel, 2 sets of blood cultures, troponin, and BNP among others. Patient presents with significant respiratory effort. Nursing reports that patient's oxygenation on room air during triage was \"in the 70s,\" prompting placement of the nonrebreather. Patient is then moved to bed trauma 3. BiPAP is ordered as documented. Patient, family, and I have discussed that if breathing does not improve with the BiPAP, patient would require intubation mechanical ventilation. Patient nods consent. Nitroglycerin titration is also ordered. Patient is agreeable to continuing plan. Patient has been assessed and reassessed on multiple occasions. Patient improved dramatically with the BiPAP.   Nitroglycerin is quickly titrated off, and she has been resting much more comfortably on the BiPAP. Color has improved. Diaphoresis has resolved. Initial chest x-ray is suspicious for a right-sided pneumonia. D-dimer is elevated, so CT of chest is added. There is, in fact, bilateral pneumonia. Fortunately, there is no evidence of complicating mass or pulmonary embolism. Patient did have significant elevation in lactic acid, so broad-spectrum antibiotics have been started as documented. Patient likely does have developing sepsis, but she has not required pressor support. There is also concern for congestive heart failure, but diuresis has been deferred due to concern for collapse of blood pressure. We have discussed admission to the hospital, and patient is agreeable. Hospitalist Dr. Ian Moreno is paged at [phone] to discuss admission. Initial verbal orders are discussed at approximately 0602. Decision time to admit: 2219. Patient seen during [facility], I did don appropriate PPE during my encounters with the patient, including n95 (when appropriate) mask and eye protection as appropriate.     I have reviewed and interpreted all of the currently available lab results from this visit (if applicable):  Results for orders placed or performed during the hospital encounter of 05/13/21   COVID-19, Rapid    Specimen: Nasopharyngeal   Result Value Ref Range    Source THROAT     SARS-CoV-2, NAAT NOT DETECTED NOT DETECTED   CBC Auto Differential   Result Value Ref Range    WBC 9.6 4.0 - 10.5 K/CU MM    RBC 4.01 (L) 4.2 - 5.4 M/CU MM    Hemoglobin 12.7 12.5 - 16.0 GM/DL    Hematocrit 40.8 37 - 47 %    .7 (H) 78 - 100 FL    MCH 31.7 (H) 27 - 31 PG    MCHC 31.1 (L) 32.0 - 36.0 %    RDW 13.3 11.7 - 14.9 %    Platelets 345 952 - 735 K/CU MM    MPV 10.5 7.5 - 11.1 FL    Segs Relative 34.0 (L) 36 - 66 %    Lymphocytes % 61.0 (H) 24 - 44 %    Monocytes % 5.0 (H) 0 - 4 %    Segs Absolute 3.3 K/CU MM    Lymphocytes Absolute 5.8 K/CU MM    Monocytes Absolute 0.5 K/CU MM    Differential Type       AUTOMATED DIFF RESULTS CONFIRMED BY SMEAR REVIEW  MANUAL DIFFERENTIAL     Comprehensive Metabolic Panel w/ Reflex to MG   Result Value Ref Range    Sodium 135 135 - 145 MMOL/L    Potassium 4.4 3.5 - 5.1 MMOL/L    Chloride 98 (L) 99 - 110 mMol/L    CO2 26 21 - 32 MMOL/L    BUN 22 6 - 23 MG/DL    CREATININE 1.0 0.6 - 1.1 MG/DL    Glucose 231 (H) 70 - 99 MG/DL    Calcium 9.9 8.3 - 10.6 MG/DL    Albumin 4.0 3.4 - 5.0 GM/DL    Total Protein 7.4 6.4 - 8.2 GM/DL    Total Bilirubin 0.2 0.0 - 1.0 MG/DL    ALT 13 10 - 40 U/L    AST 17 15 - 37 IU/L    Alkaline Phosphatase 97 40 - 128 IU/L    GFR Non- 52 (L) >60 mL/min/1.73m2    GFR African American >60 >60 mL/min/1.73m2    Anion Gap 11 4 - 16   Troponin   Result Value Ref Range    Troponin T 0.023 (H) <0.01 NG/ML   Brain Natriuretic Peptide   Result Value Ref Range    Pro-BNP 5,131 (H) <300 PG/ML   Protime-INR   Result Value Ref Range    Protime 15.1 (H) 11.7 - 14.5 SECONDS    INR 1.25 INDEX   APTT   Result Value Ref Range    aPTT 32.5 25.1 - 37.1 SECONDS   Blood Gas, Venous   Result Value Ref Range    pH, Neel 7.13 (L) 7.32 - 7.42    pCO2, Neel 79 (H) 38 - 52 mmHG    pO2, Neel 154 (H) 28 - 48 mmHG    Base Excess 5 (H) 0 - 2.4    HCO3, Venous 26.3 (H) 19 - 25 MMOL/L    O2 Sat, Neel 94.9 (H) 50 - 70 %    Comment VBG    Lactate, Sepsis   Result Value Ref Range    Lactic Acid, Sepsis 5.7 (HH) 0.5 - 1.9 mMOL/L   Magnesium   Result Value Ref Range    Magnesium 1.8 1.8 - 2.4 mg/dl   D-Dimer, Quantitative   Result Value Ref Range    D-Dimer, Quant 402 (H) <230 NG/mL(DDU)   TYPE AND SCREEN   Result Value Ref Range    ABO/Rh O POSITIVE     Antibody Screen NEGATIVE         Radiographs (if obtained):  Radiologist's Report Reviewed:  Xr Chest Portable    Result Date: 5/13/2021  EXAMINATION: ONE XRAY VIEW OF THE CHEST 5/13/2021 6:17 pm COMPARISON: CT chest May 15, 2020; chest x-ray May 15, 2020 HISTORY: ORDERING SYSTEM PROVIDED HISTORY: adequately opacified for evaluation. No evidence of intraluminal filling defect to suggest pulmonary embolism. Main pulmonary artery is normal in caliber. Mediastinum: The heart is enlarged. Sternal fixation wires are present. There is aneurysmal enlargement of the ascending thoracic aorta, measuring 5.1 cm in AP dimension at the level of the main pulmonary artery. The proximal descending thoracic aorta at the same level is normal in caliber and measures 2.2 cm. .  No mediastinal hematoma. No pericardial effusion. No enlarged or suspicious axillary, hilar, or mediastinal lymphadenopathy. Lungs/pleura: The trachea and mainstem bronchi are widely patent. There is diffuse ground-glass opacity throughout the lungs bilaterally to a moderate degree, somewhat asymmetric and more pronounced on the right. Small right pleural effusion. No pneumothorax or left pleural effusion. Soft Tissues/Bones: Degenerative changes are present throughout the thoracic spine. There is a new chronic compression deformity of the T10 vertebral body with a vertebral plana appearance. Upper Abdomen: The visualized upper abdomen is unremarkable. No evidence of pulmonary embolism. Slight interval increase in size of the known in sending aortic aneurysm, now measuring 5.1 cm (previously 4.8 cm). Cardiomegaly. Small right pleural effusion. New moderate diffuse ground-glass opacities throughout the lungs bilaterally, somewhat asymmetric and more pronounced on the right. This may be due to pulmonary edema although active COVID-19 viral pneumonia could have a similar appearance in the appropriate setting. .    EKG:  Twelve-lead EKG obtained at 1736 on 13 May 2021 and interpreted by me in the absence of a cardiologist.  There is no criteria ST elevation or reciprocal change. There are no hyperacute T wave changes. There is no sign of acute ischemia or infarction.  This tracing shows a irregularly irregular rhythm with significant baseline artifact. Rate and intervals are 109 beats per minute, MN interval indeterminant milliseconds, QRS duration 84 milliseconds, QTc interval 420 milliseconds, and R axis left shifted at -77 degrees. There is no acute change compared with the most recent EKG dated May 15, 2020 except for sinus rhythm at that time. Medical decision making:  Patient presents to the emergency department with respiratory distress secondary to bilateral pneumonia and likely developing congestive heart failure. Consider developing flash pulmonary edema. Symptoms improved fairly rapidly with BiPAP and short duration of nitroglycerin infusion. Subsequent CT scan of the chest shows no evidence of pulmonary embolism. The lungs do show rather generalized inflammation, but she is currently COVID-19 negative. Hospitalist indicates that he will recheck with a more sensitive PCR test in the hospital.  Troponin is elevated, but EKG shows no detectable ischemia or infarction. Elevation may be due to cardiac strain associated with sepsis. Similarly, BNP is elevated, consistent with a component of congestive heart failure. Renal function is intact. Abdomen is nonsurgical.  CT scan makes incidental note of thoracic aortic aneurysm that has shown some mild enlargement since last surveilled. There is no evidence of hemorrhage or dissection. Procedures: None. Consultations: Hospitalist service. Clinical Impression:  1. Pneumonia of both lungs due to infectious organism, unspecified part of lung    2. Respiratory distress    3. Acute congestive heart failure, unspecified heart failure type (HCC)    4. Elevated troponin    5. Septicemia ([facility] [address]    6. Thoracic ascending aortic aneurysm [facility])      Disposition referral (if applicable):  No follow-up provider specified.   Disposition medications (if applicable):  New Prescriptions    No medications on file     ED Provider Disposition Time  DISPOSITION Decision To Admit 05/13/2021 10:15:32 PM      Comment: Please note this report has been produced using speech recognition software and may contain errors related to that system including errors in grammar, punctuation, and spelling, as well as words and phrases that may be inappropriate. Efforts were made to edit the dictations.         Darryl Burt MD  05/13/21 4640

## 2021-05-13 NOTE — Clinical Note
Patient Class: Inpatient [101]   REQUIRED: Diagnosis: SOB (shortness of breath) [086004]   Estimated Length of Stay: Estimated stay of more than 2 midnights   Telemetry/Cardiac Monitoring Required?: Yes

## 2021-05-14 PROBLEM — I50.33 ACUTE ON CHRONIC DIASTOLIC CONGESTIVE HEART FAILURE (HCC): Status: ACTIVE | Noted: 2021-05-14

## 2021-05-14 LAB
ADENOVIRUS DETECTION BY PCR: NOT DETECTED
ALBUMIN SERPL-MCNC: 3.9 GM/DL (ref 3.4–5)
ALP BLD-CCNC: 83 IU/L (ref 40–128)
ALT SERPL-CCNC: 13 U/L (ref 10–40)
ANION GAP SERPL CALCULATED.3IONS-SCNC: 9 MMOL/L (ref 4–16)
AST SERPL-CCNC: 27 IU/L (ref 15–37)
BACTERIA: ABNORMAL /HPF
BASE EXCESS MIXED: 3.6 (ref 0–2.3)
BASOPHILS ABSOLUTE: 0 K/CU MM
BASOPHILS RELATIVE PERCENT: 0.1 % (ref 0–1)
BILIRUB SERPL-MCNC: 0.2 MG/DL (ref 0–1)
BILIRUBIN URINE: NEGATIVE MG/DL
BLOOD, URINE: NEGATIVE
BORDETELLA PARAPERTUSSIS BY PCR: NOT DETECTED
BORDETELLA PERTUSSIS PCR: NOT DETECTED
BUN BLDV-MCNC: 21 MG/DL (ref 6–23)
CALCIUM SERPL-MCNC: 9.6 MG/DL (ref 8.3–10.6)
CHLAMYDOPHILA PNEUMONIA PCR: NOT DETECTED
CHLORIDE BLD-SCNC: 99 MMOL/L (ref 99–110)
CLARITY: CLEAR
CO2: 28 MMOL/L (ref 21–32)
COLOR: YELLOW
COMMENT: ABNORMAL
CORONAVIRUS 229E PCR: NOT DETECTED
CORONAVIRUS HKU1 PCR: NOT DETECTED
CORONAVIRUS NL63 PCR: NOT DETECTED
CORONAVIRUS OC43 PCR: NOT DETECTED
CREAT SERPL-MCNC: 0.9 MG/DL (ref 0.6–1.1)
DIFFERENTIAL TYPE: ABNORMAL
EKG ATRIAL RATE: 100 BPM
EKG ATRIAL RATE: 86 BPM
EKG DIAGNOSIS: NORMAL
EKG DIAGNOSIS: NORMAL
EKG P AXIS: 4 DEGREES
EKG P-R INTERVAL: 214 MS
EKG Q-T INTERVAL: 312 MS
EKG Q-T INTERVAL: 362 MS
EKG QRS DURATION: 84 MS
EKG QRS DURATION: 84 MS
EKG QTC CALCULATION (BAZETT): 420 MS
EKG QTC CALCULATION (BAZETT): 433 MS
EKG R AXIS: -37 DEGREES
EKG R AXIS: -77 DEGREES
EKG T AXIS: 102 DEGREES
EKG T AXIS: 79 DEGREES
EKG VENTRICULAR RATE: 109 BPM
EKG VENTRICULAR RATE: 86 BPM
EOSINOPHILS ABSOLUTE: 0 K/CU MM
EOSINOPHILS RELATIVE PERCENT: 0 % (ref 0–3)
GFR AFRICAN AMERICAN: >60 ML/MIN/1.73M2
GFR NON-AFRICAN AMERICAN: 59 ML/MIN/1.73M2
GLUCOSE BLD-MCNC: 106 MG/DL (ref 70–99)
GLUCOSE, URINE: NEGATIVE MG/DL
HCO3 VENOUS: 31.1 MMOL/L (ref 19–25)
HCT VFR BLD CALC: 38.2 % (ref 37–47)
HEMOGLOBIN: 12 GM/DL (ref 12.5–16)
HUMAN METAPNEUMOVIRUS PCR: NOT DETECTED
HYALINE CASTS: 2 /LPF
IMMATURE NEUTROPHIL %: 0.4 % (ref 0–0.43)
INFLUENZA A BY PCR: NOT DETECTED
INFLUENZA A H1 (2009) PCR: NOT DETECTED
INFLUENZA A H1 PANDEMIC PCR: NOT DETECTED
INFLUENZA A H3 PCR: NOT DETECTED
INFLUENZA B BY PCR: NOT DETECTED
KETONES, URINE: ABNORMAL MG/DL
LACTIC ACID, SEPSIS: 0.9 MMOL/L (ref 0.5–1.9)
LEUKOCYTE ESTERASE, URINE: NEGATIVE
LV EF: 50 %
LVEF MODALITY: NORMAL
LYMPHOCYTES ABSOLUTE: 1 K/CU MM
LYMPHOCYTES RELATIVE PERCENT: 13.9 % (ref 24–44)
MCH RBC QN AUTO: 30.8 PG (ref 27–31)
MCHC RBC AUTO-ENTMCNC: 31.4 % (ref 32–36)
MCV RBC AUTO: 98.2 FL (ref 78–100)
MONOCYTES ABSOLUTE: 0.2 K/CU MM
MONOCYTES RELATIVE PERCENT: 3.1 % (ref 0–4)
MUCUS: ABNORMAL HPF
MYCOPLASMA PNEUMONIAE PCR: NOT DETECTED
NITRITE URINE, QUANTITATIVE: NEGATIVE
NUCLEATED RBC %: 0 %
O2 SAT, VEN: 93.8 % (ref 50–70)
PARAINFLUENZA 1 PCR: NOT DETECTED
PARAINFLUENZA 2 PCR: NOT DETECTED
PARAINFLUENZA 3 PCR: NOT DETECTED
PARAINFLUENZA 4 PCR: NOT DETECTED
PCO2, VEN: 59 MMHG (ref 38–52)
PDW BLD-RTO: 13.2 % (ref 11.7–14.9)
PH VENOUS: 7.33 (ref 7.32–7.42)
PH, URINE: 5 (ref 5–8)
PLATELET # BLD: 222 K/CU MM (ref 140–440)
PMV BLD AUTO: 10.1 FL (ref 7.5–11.1)
PO2, VEN: 232 MMHG (ref 28–48)
POTASSIUM SERPL-SCNC: 4.8 MMOL/L (ref 3.5–5.1)
PROTEIN UA: NEGATIVE MG/DL
RBC # BLD: 3.89 M/CU MM (ref 4.2–5.4)
RBC URINE: 2 /HPF (ref 0–6)
RHINOVIRUS ENTEROVIRUS PCR: NOT DETECTED
RSV PCR: NOT DETECTED
SARS-COV-2: NOT DETECTED
SEGMENTED NEUTROPHILS ABSOLUTE COUNT: 5.8 K/CU MM
SEGMENTED NEUTROPHILS RELATIVE PERCENT: 82.5 % (ref 36–66)
SODIUM BLD-SCNC: 136 MMOL/L (ref 135–145)
SPECIFIC GRAVITY UA: 1.04 (ref 1–1.03)
SQUAMOUS EPITHELIAL: <1 /HPF
TOTAL IMMATURE NEUTOROPHIL: 0.03 K/CU MM
TOTAL NUCLEATED RBC: 0 K/CU MM
TOTAL PROTEIN: 6.7 GM/DL (ref 6.4–8.2)
TRICHOMONAS: ABNORMAL /HPF
TROPONIN T: 0.13 NG/ML
UROBILINOGEN, URINE: NEGATIVE MG/DL (ref 0.2–1)
WBC # BLD: 7 K/CU MM (ref 4–10.5)
WBC UA: 2 /HPF (ref 0–5)

## 2021-05-14 PROCEDURE — 94640 AIRWAY INHALATION TREATMENT: CPT

## 2021-05-14 PROCEDURE — 6360000002 HC RX W HCPCS: Performed by: EMERGENCY MEDICINE

## 2021-05-14 PROCEDURE — 85025 COMPLETE CBC W/AUTO DIFF WBC: CPT

## 2021-05-14 PROCEDURE — 81001 URINALYSIS AUTO W/SCOPE: CPT

## 2021-05-14 PROCEDURE — 84484 ASSAY OF TROPONIN QUANT: CPT

## 2021-05-14 PROCEDURE — 6370000000 HC RX 637 (ALT 250 FOR IP): Performed by: INTERNAL MEDICINE

## 2021-05-14 PROCEDURE — 6370000000 HC RX 637 (ALT 250 FOR IP): Performed by: EMERGENCY MEDICINE

## 2021-05-14 PROCEDURE — 6360000002 HC RX W HCPCS: Performed by: INTERNAL MEDICINE

## 2021-05-14 PROCEDURE — 93306 TTE W/DOPPLER COMPLETE: CPT

## 2021-05-14 PROCEDURE — 80053 COMPREHEN METABOLIC PANEL: CPT

## 2021-05-14 PROCEDURE — 93010 ELECTROCARDIOGRAM REPORT: CPT | Performed by: INTERNAL MEDICINE

## 2021-05-14 PROCEDURE — 2000000000 HC ICU R&B

## 2021-05-14 PROCEDURE — 2700000000 HC OXYGEN THERAPY PER DAY

## 2021-05-14 PROCEDURE — 93005 ELECTROCARDIOGRAM TRACING: CPT | Performed by: INTERNAL MEDICINE

## 2021-05-14 PROCEDURE — 36415 COLL VENOUS BLD VENIPUNCTURE: CPT

## 2021-05-14 PROCEDURE — 94664 DEMO&/EVAL PT USE INHALER: CPT

## 2021-05-14 PROCEDURE — 94761 N-INVAS EAR/PLS OXIMETRY MLT: CPT

## 2021-05-14 PROCEDURE — 99223 1ST HOSP IP/OBS HIGH 75: CPT | Performed by: INTERNAL MEDICINE

## 2021-05-14 PROCEDURE — 83605 ASSAY OF LACTIC ACID: CPT

## 2021-05-14 PROCEDURE — 2580000003 HC RX 258: Performed by: EMERGENCY MEDICINE

## 2021-05-14 PROCEDURE — 2580000003 HC RX 258: Performed by: INTERNAL MEDICINE

## 2021-05-14 RX ORDER — PREDNISONE 20 MG/1
20 TABLET ORAL 2 TIMES DAILY
Status: DISCONTINUED | OUTPATIENT
Start: 2021-05-15 | End: 2021-05-16 | Stop reason: HOSPADM

## 2021-05-14 RX ORDER — PROMETHAZINE HYDROCHLORIDE 25 MG/1
12.5 TABLET ORAL EVERY 6 HOURS PRN
Status: DISCONTINUED | OUTPATIENT
Start: 2021-05-14 | End: 2021-05-16 | Stop reason: HOSPADM

## 2021-05-14 RX ORDER — SODIUM CHLORIDE 9 MG/ML
25 INJECTION, SOLUTION INTRAVENOUS PRN
Status: DISCONTINUED | OUTPATIENT
Start: 2021-05-14 | End: 2021-05-16 | Stop reason: HOSPADM

## 2021-05-14 RX ORDER — FUROSEMIDE 10 MG/ML
40 INJECTION INTRAMUSCULAR; INTRAVENOUS 2 TIMES DAILY
Status: DISCONTINUED | OUTPATIENT
Start: 2021-05-14 | End: 2021-05-16

## 2021-05-14 RX ORDER — SODIUM CHLORIDE 0.9 % (FLUSH) 0.9 %
10 SYRINGE (ML) INJECTION EVERY 12 HOURS SCHEDULED
Status: DISCONTINUED | OUTPATIENT
Start: 2021-05-14 | End: 2021-05-16 | Stop reason: HOSPADM

## 2021-05-14 RX ORDER — ATORVASTATIN CALCIUM 10 MG/1
10 TABLET, FILM COATED ORAL DAILY
Status: DISCONTINUED | OUTPATIENT
Start: 2021-05-14 | End: 2021-05-16 | Stop reason: HOSPADM

## 2021-05-14 RX ORDER — CARVEDILOL 6.25 MG/1
6.25 TABLET ORAL 2 TIMES DAILY
Status: DISCONTINUED | OUTPATIENT
Start: 2021-05-14 | End: 2021-05-16

## 2021-05-14 RX ORDER — ASPIRIN 81 MG/1
81 TABLET, CHEWABLE ORAL DAILY
Status: DISCONTINUED | OUTPATIENT
Start: 2021-05-14 | End: 2021-05-16 | Stop reason: HOSPADM

## 2021-05-14 RX ORDER — FUROSEMIDE 10 MG/ML
20 INJECTION INTRAMUSCULAR; INTRAVENOUS ONCE
Status: COMPLETED | OUTPATIENT
Start: 2021-05-14 | End: 2021-05-14

## 2021-05-14 RX ORDER — SODIUM CHLORIDE 0.9 % (FLUSH) 0.9 %
10 SYRINGE (ML) INJECTION PRN
Status: DISCONTINUED | OUTPATIENT
Start: 2021-05-14 | End: 2021-05-16 | Stop reason: HOSPADM

## 2021-05-14 RX ORDER — NITROGLYCERIN 0.4 MG/1
0.4 TABLET SUBLINGUAL EVERY 5 MIN PRN
Status: DISCONTINUED | OUTPATIENT
Start: 2021-05-14 | End: 2021-05-16 | Stop reason: HOSPADM

## 2021-05-14 RX ORDER — BUDESONIDE AND FORMOTEROL FUMARATE DIHYDRATE 160; 4.5 UG/1; UG/1
2 AEROSOL RESPIRATORY (INHALATION) 2 TIMES DAILY
Status: DISCONTINUED | OUTPATIENT
Start: 2021-05-14 | End: 2021-05-16 | Stop reason: HOSPADM

## 2021-05-14 RX ORDER — ACETAMINOPHEN 650 MG/1
650 SUPPOSITORY RECTAL EVERY 6 HOURS PRN
Status: DISCONTINUED | OUTPATIENT
Start: 2021-05-14 | End: 2021-05-16 | Stop reason: HOSPADM

## 2021-05-14 RX ORDER — ACETAMINOPHEN 500 MG
500 TABLET ORAL EVERY 6 HOURS PRN
Status: DISCONTINUED | OUTPATIENT
Start: 2021-05-14 | End: 2021-05-14 | Stop reason: SDUPTHER

## 2021-05-14 RX ORDER — ONDANSETRON 2 MG/ML
4 INJECTION INTRAMUSCULAR; INTRAVENOUS EVERY 6 HOURS PRN
Status: DISCONTINUED | OUTPATIENT
Start: 2021-05-14 | End: 2021-05-16 | Stop reason: HOSPADM

## 2021-05-14 RX ORDER — LEVOTHYROXINE SODIUM 0.03 MG/1
25 TABLET ORAL DAILY
Status: DISCONTINUED | OUTPATIENT
Start: 2021-05-14 | End: 2021-05-16 | Stop reason: HOSPADM

## 2021-05-14 RX ORDER — ACETAMINOPHEN 325 MG/1
650 TABLET ORAL EVERY 6 HOURS PRN
Status: DISCONTINUED | OUTPATIENT
Start: 2021-05-14 | End: 2021-05-16 | Stop reason: HOSPADM

## 2021-05-14 RX ADMIN — SODIUM CHLORIDE, PRESERVATIVE FREE 10 ML: 5 INJECTION INTRAVENOUS at 09:55

## 2021-05-14 RX ADMIN — SODIUM CHLORIDE, PRESERVATIVE FREE 10 ML: 5 INJECTION INTRAVENOUS at 21:42

## 2021-05-14 RX ADMIN — ALBUTEROL SULFATE 5 MG: 2.5 SOLUTION RESPIRATORY (INHALATION) at 02:28

## 2021-05-14 RX ADMIN — BUDESONIDE AND FORMOTEROL FUMARATE DIHYDRATE 2 PUFF: 160; 4.5 AEROSOL RESPIRATORY (INHALATION) at 22:09

## 2021-05-14 RX ADMIN — APIXABAN 5 MG: 5 TABLET, FILM COATED ORAL at 21:00

## 2021-05-14 RX ADMIN — CARVEDILOL 6.25 MG: 6.25 TABLET, FILM COATED ORAL at 21:37

## 2021-05-14 RX ADMIN — LEVOTHYROXINE SODIUM 25 MCG: 25 TABLET ORAL at 08:11

## 2021-05-14 RX ADMIN — CARVEDILOL 6.25 MG: 6.25 TABLET, FILM COATED ORAL at 09:39

## 2021-05-14 RX ADMIN — FUROSEMIDE 20 MG: 10 INJECTION, SOLUTION INTRAMUSCULAR; INTRAVENOUS at 03:01

## 2021-05-14 RX ADMIN — FUROSEMIDE 40 MG: 10 INJECTION, SOLUTION INTRAVENOUS at 09:43

## 2021-05-14 RX ADMIN — FUROSEMIDE 40 MG: 10 INJECTION, SOLUTION INTRAVENOUS at 18:03

## 2021-05-14 RX ADMIN — ACETAMINOPHEN 650 MG: 325 TABLET ORAL at 00:12

## 2021-05-14 RX ADMIN — BUDESONIDE AND FORMOTEROL FUMARATE DIHYDRATE 2 PUFF: 160; 4.5 AEROSOL RESPIRATORY (INHALATION) at 07:35

## 2021-05-14 RX ADMIN — METHYLPREDNISOLONE SODIUM SUCCINATE 80 MG: 125 INJECTION, POWDER, FOR SOLUTION INTRAMUSCULAR; INTRAVENOUS at 00:12

## 2021-05-14 RX ADMIN — ACETAMINOPHEN 650 MG: 325 TABLET ORAL at 21:37

## 2021-05-14 RX ADMIN — ASPIRIN 81 MG: 81 TABLET, CHEWABLE ORAL at 09:40

## 2021-05-14 RX ADMIN — ATORVASTATIN CALCIUM 10 MG: 10 TABLET, FILM COATED ORAL at 09:39

## 2021-05-14 RX ADMIN — ACETAMINOPHEN 650 MG: 325 TABLET ORAL at 10:34

## 2021-05-14 ASSESSMENT — PAIN DESCRIPTION - DESCRIPTORS
DESCRIPTORS: ACHING;HEADACHE
DESCRIPTORS: ACHING
DESCRIPTORS: TIGHTNESS

## 2021-05-14 ASSESSMENT — PAIN DESCRIPTION - FREQUENCY
FREQUENCY: INTERMITTENT
FREQUENCY: INTERMITTENT

## 2021-05-14 ASSESSMENT — PAIN DESCRIPTION - LOCATION
LOCATION: BACK;CHEST
LOCATION: HEAD;NECK
LOCATION: BACK

## 2021-05-14 ASSESSMENT — PAIN - FUNCTIONAL ASSESSMENT: PAIN_FUNCTIONAL_ASSESSMENT: ACTIVITIES ARE NOT PREVENTED

## 2021-05-14 ASSESSMENT — PAIN DESCRIPTION - PROGRESSION: CLINICAL_PROGRESSION: NOT CHANGED

## 2021-05-14 ASSESSMENT — PAIN DESCRIPTION - ONSET: ONSET: GRADUAL

## 2021-05-14 ASSESSMENT — PAIN SCALES - GENERAL
PAINLEVEL_OUTOF10: 2
PAINLEVEL_OUTOF10: 2
PAINLEVEL_OUTOF10: 4
PAINLEVEL_OUTOF10: 2
PAINLEVEL_OUTOF10: 1

## 2021-05-14 ASSESSMENT — PAIN DESCRIPTION - PAIN TYPE
TYPE: ACUTE PAIN
TYPE: CHRONIC PAIN;ACUTE PAIN

## 2021-05-14 NOTE — PROGRESS NOTES
Discussed questions with patient and daughter about cardiac cath vs medical management. Pt would like to stay with medical management, doesn't want to do an invasive cath. She also expressed a desire to change code status to [facility]. Dr. Ruby Pablo notified.

## 2021-05-14 NOTE — PROGRESS NOTES
Hospitalist Progress Note      Name:  Keenan Ventura /Age/Sex: 1933  (80 y.o. female)   MRN & CSN:  [id number] & [id number] Admission Date/Time: 2021  5:20 PM   Location:  - PCP: Damaris Andrews MD         Hospital Day: 2    History of Present Illness:     Chief Complaint: Keenan Ventura is a 80 y.o.  female  who presents with SOB     The patient seen and examined at bed side. She says her SOB and chest discomfort have improved. Ten point ROS reviewed negative, unless as noted above    Objective:        Intake/Output Summary (Last 24 hours) at 2021 1435  Last data filed at 2021 0655  Gross per 24 hour   Intake --   Output 700 ml   Net -700 ml      Vitals:   Vitals:    21 1400   BP: (!) 97/51   Pulse: 83   Resp: 17   Temp:    SpO2: 96%     Physical Exam:   General Appearance: alert and oriented to person, place and time, in no acute distress  Cardiovascular: normal rate, regular rhythm, normal S1 and S2  Pulmonary/Chest: Bilateral crackles  Abdomen: soft, non-tender, non-distended, normal bowel sounds, no masses   Extremities: Bilateral lower extremity pitting edema  Skin: warm and dry, no rash or erythema  Head: normocephalic and atraumatic  Eyes: pupils equal, round, and reactive to light  Neck: supple and non-tender without mass, no thyromegaly   Musculoskeletal: normal range of motion, no joint swelling, deformity or tenderness  Neurological: alert, oriented, normal speech, no focal findings or movement disorder noted    Medications:   Medications:    apixaban  5 mg Oral BID    aspirin  81 mg Oral Daily    atorvastatin  10 mg Oral Daily    carvedilol  6.25 mg Oral BID    levothyroxine  25 mcg Oral Daily    budesonide-formoterol  2 puff Inhalation BID    sodium chloride flush  10 mL Intravenous 2 times per day    [START ON 5/15/2021] cefTRIAXone (ROCEPHIN) IV  1,000 mg Intravenous Q24H    [START ON 5/15/2021] azithromycin  500 mg Intravenous Q24H  [START ON 5/15/2021] predniSONE  20 mg Oral BID    furosemide  40 mg Intravenous BID    sodium chloride flush  10 mL Intravenous BID      Infusions:    sodium chloride       PRN Meds: albuterol, 5 mg, 4x Daily PRN  nitroGLYCERIN, 0.4 mg, Q5 Min PRN  sodium chloride flush, 10 mL, PRN  sodium chloride, 25 mL, PRN  promethazine, 12.5 mg, Q6H PRN    Or  ondansetron, 4 mg, Q6H PRN  magnesium hydroxide, 30 mL, Daily PRN  acetaminophen, 650 mg, Q6H PRN    Or  acetaminophen, 650 mg, Q6H PRN            Pertinent New Labs & Imaging Studies     CBC with Differential:    Lab Results   Component Value Date    WBC 7.0 05/14/2021    RBC 3.89 05/14/2021    HGB 12.0 05/14/2021    HCT 38.2 05/14/2021     05/14/2021    MCV 98.2 05/14/2021    MCH 30.8 05/14/2021    MCHC 31.4 05/14/2021    RDW 13.2 05/14/2021    SEGSPCT 82.5 05/14/2021    LYMPHOPCT 13.9 05/14/2021    MONOPCT 3.1 05/14/2021    EOSPCT 3.5 05/25/2017    BASOPCT 0.1 05/14/2021    MONOSABS 0.2 05/14/2021    LYMPHSABS 1.0 05/14/2021    EOSABS 0.0 05/14/2021    BASOSABS 0.0 05/14/2021    DIFFTYPE AUTOMATED DIFFERENTIAL 05/14/2021     CMP:    Lab Results   Component Value Date     05/14/2021    K 4.8 05/14/2021    CL 99 05/14/2021    CO2 28 05/14/2021    BUN 21 05/14/2021    CREATININE 0.9 05/14/2021    GFRAA >60 05/14/2021    AGRATIO 1.1 07/31/2019    LABGLOM 59 05/14/2021    GLUCOSE 106 05/14/2021    PROT 6.7 05/14/2021    LABALBU 3.9 05/14/2021    CALCIUM 9.6 05/14/2021    BILITOT 0.2 05/14/2021    ALKPHOS 83 05/14/2021    AST 27 05/14/2021    ALT 13 05/14/2021     Xr Chest Portable    Result Date: 5/13/2021  EXAMINATION: ONE XRAY VIEW OF THE CHEST 5/13/2021 6:17 pm COMPARISON: CT chest May 15, 2020; chest x-ray May 15, 2020 HISTORY: ORDERING SYSTEM PROVIDED HISTORY: Respiratory distress TECHNOLOGIST PROVIDED HISTORY: Reason for exam:->Respiratory distress Reason for Exam: Respiratory distress Acuity: Acute Type of Exam: Initial Additional signs and appreciated-Plan for heart cath as troponin's trending up, family debating at this time.  On Heparin drip  Continue empiric antibiotics  Continue Coreg  Echocardiogram with EF 50% with Grade I DD  Continue as needed BiPAP and Oxygen support     Lactic acidosis-Resolved  Likely with Hypoxia    Acquired hypothyroidism  Continue Synthroid     Paroxysmal atrial fibrillation  Holding Eliquis, currently on Heparin drip  Coreg     Coronary artery disease involving coronary bypass graft of native heart without angina pectoris  Aspirin, Lipitor     Chronic obstructive pulmonary disease, likely not in acute exacerbation  Continue nebulizer as needed    Diet DIET CARDIAC;   DVT Prophylaxis [] Lovenox, [x]  Heparin, [] SCDs, [] Ambulation   GI Prophylaxis [] PPI,  [] H2 Blocker,  [] Carafate,  [x] Diet/Tube Feeds   Code Status DNR-CCA   Disposition Patient requires continued admission due to NSTEMI   MDM [] Low, [] Moderate,[]  High     Electronically signed by Randy Goode MD on 5/14/2021 at 2:35 PM

## 2021-05-14 NOTE — FLOWSHEET NOTE
Pt admitted to room 2125 from ER. [facility] Skin check done with Kamryn Crystal RN. No open areas noted. Coccyx/buttocks slightly pink, blanches well. Placed on cardiac monitor. Call light in reach. Pt denies needs at this time.

## 2021-05-14 NOTE — ED NOTES
Pt belongings of glasses, bilateral hearing aids, and blanket delivered to 2125 at this time with pt.      Dany Cordova RN  05/14/21 8866

## 2021-05-14 NOTE — CONSULTS
Pulmonary Consult Note      Reason for Consult: Pneumonia   Requesting Physician: Dr. Ruben Vigil  Subjective:   CHIEF COMPLAINT :SOB    Patient Active Problem List    Diagnosis Date Noted    Expressive aphasia 01/21/2016     Priority: Medium    Paroxysmal atrial fibrillation ([facility] 01/13/2016     Priority: Medium    Acute on chronic diastolic congestive heart failure ([facility] 05/14/2021    Chronic obstructive pulmonary disease (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  VHD (valvular heart disease)     History of PTCA      Overview Note:     LAD,RCAX2 Ramus      Hyperlipidemia         HPI:                The patient is a 80 y.o. female with significant past medical history of Afib, HTN, GERD,  CAD, HLD, Obesity, VHD  presents with complaints of SOB for the last  Few weeks getting worse. She has no fever, no sick exposure, no headaches, no n/v, no abd pain, no diarrhea, no dysuria. She had a ground glass opacities on the CT chest. Her PBNPT are elevated along with mild increase in troponins. She is on Abx. At this time she is lying in the bed.  She is in mild resp distress    Past Medical History:      Diagnosis Date    A-fib [facility]     Bronchospasm     CHF (congestive heart failure) (HCC)     COPD (chronic obstructive pulmonary disease) ([facility]     Essential hypertension     Family history of coronary artery disease     GERD (gastroesophageal reflux disease)     H/O cardiovascular stress test 08/13/2013    EF 70%, no ischemia, normal perfusion pattern in all regions, normal study.  H/O echocardiogram 06/05/2019    EF 80-12%, Grade I diastolic dysfunction, Mild septal wall asymmetrical left ventricular hypertophy, sclerotic but non stenotic aortic vavle, mitral annular calcification, Mild AR,     Headache     History of PTCA 1995,10/7/05,10/25/05    LAD,RCAX2 Ramus    Hx of cardiovascular stress test     11/12/2009=normal perfusion EF 70%, pt c/o 6/10 chest pain during adenosine infusion;  03/2008=EF 65%; 02/2006=EF 70%; 05/2005; 08/2001;10/1997; 09/1996    Hx of echocardiogram 8/13/13,11/12/09,05/2006,08/2001,10/1997    8/13-EF 50-55% essentially normal study.  11/09=EF>55%, mild MR    Hyperlipidemia     Obesity     Paroxysmal atrial fibrillation ([facility] 1/13/2016    Postmenopausal state     Restrictive lung disease     VHD (valvular heart disease)       Past Surgical History:        Procedure Laterality Date    CATARACT REMOVAL WITH IMPLANT Bilateral 2006    CHOLECYSTECTOMY      CORONARY ANGIOPLASTY  2005, 1995    10/25/2005=ramus 2.5x15mm taxus and 2.5x8mm ostium and proximal portion of the ramus; PTCA LAD in [address]  07/2016    x 2    THORACENTESIS Left 08/15/2016    525 ml s/s     Current Medications:     [Held by provider] apixaban  5 mg Oral BID    aspirin  81 mg Oral Daily    atorvastatin  10 mg Oral Daily    carvedilol  6.25 mg Oral BID    levothyroxine  25 mcg Oral Daily    budesonide-formoterol  2 puff Inhalation BID    sodium chloride flush  10 mL Intravenous 2 times per day    [START ON 5/15/2021] cefTRIAXone (ROCEPHIN) IV  1,000 mg Intravenous Q24H    [START ON 5/15/2021] azithromycin  500 mg Intravenous Q24H    [START ON 5/15/2021] predniSONE  20 mg Oral BID    (1.499 m)   Wt 170 lb 3.1 oz (77.2 kg)   SpO2 98%   BMI 34.38 kg/m²   24HR INTAKE/OUTPUT:      Intake/Output Summary (Last 24 hours) at 2021 1048  Last data filed at 2021 0655  Gross per 24 hour   Intake --   Output 700 ml   Net -700 ml     CURRENT PULSE OXIMETRY:  SpO2: 98 %  24HR PULSE OXIMETRY RANGE:  SpO2  Av.2 %  Min: 91 %  Max: 100 %    CONSTITUTIONAL:  awake, alert, cooperative, no apparent distress, and appears stated age  NECK:  Supple, symmetrical, trachea midline, no adenopathy, thyroid symmetric, not enlarged and no tenderness, skin normal  LUNGS: Occasional basal crackles  CARDIOVASCULAR:  normal S1 and S2, no edema and no JVD  ABDOMEN:  normal bowel sounds, non-distended and no masses palpated, and no tenderness to palpation. No hepatospleenomegaly  LYMPHADENOPATHY:  no axillary or supraclavicular adenopathy. No cervical adnenopathy  PSYCHIATRIC: Oriented to person place and time. No obvious depression or anxiety. MUSCULOSKELETAL: No obvious misalignment or effusion of the joints. No clubbing, cyanosis of the digits. SKIN:  normal skin color, texture, turgor and no redness, warmth, or swelling.  No palpable nodules    DATA:    Old records have been reviewed  CBC with Differential:    Lab Results   Component Value Date    WBC 7.0 2021    RBC 3.89 2021    HGB 12.0 2021    HCT 38.2 2021     2021    MCV 98.2 2021    MCH 30.8 2021    MCHC 31.4 2021    RDW 13.2 2021    SEGSPCT 82.5 2021    LYMPHOPCT 13.9 2021    MONOPCT 3.1 2021    EOSPCT 3.5 2017    BASOPCT 0.1 2021    MONOSABS 0.2 2021    LYMPHSABS 1.0 2021    EOSABS 0.0 2021    BASOSABS 0.0 2021    DIFFTYPE AUTOMATED DIFFERENTIAL 2021     BMP:    Lab Results   Component Value Date     2021    K 4.8 2021    CL 99 2021    CO2 28 2021    BUN 21 2021    CREATININE 0.9 2021 CALCIUM 9.6 05/14/2021    GFRAA >60 05/14/2021    LABGLOM 59 05/14/2021    GLUCOSE 106 05/14/2021     Hepatic Function Panel:    Lab Results   Component Value Date    ALKPHOS 83 05/14/2021    ALT 13 05/14/2021    AST 27 05/14/2021    PROT 6.7 05/14/2021    BILITOT 0.2 05/14/2021     ABG:    Lab Results   Component Value Date    QBL0KLI 15.6 07/16/2016    UOR2RBO 34.0 07/16/2016    PO2ART 89 07/16/2016       Cultures:   Pending       Radiology Review:      Consolidation at the right lung base which is highly suggestive of   pneumonia.  Recommend interval follow-up to ensure resolution after   appropriate clinical therapy.      No evidence of pulmonary embolism.       Slight interval increase in size of the known in sending aortic aneurysm, now   measuring 5.1 cm (previously 4.8 cm).       Cardiomegaly.  Small right pleural effusion.       New moderate diffuse ground-glass opacities throughout the lungs bilaterally,   somewhat asymmetric and more pronounced on the right.  This may be due to   pulmonary edema although active COVID-19 viral pneumonia could have a similar   appearance in the appropriate setting           Assessment/Plan       Patient Active Problem List    Diagnosis Date Noted    Expressive aphasia 01/21/2016     Priority: Medium    Paroxysmal atrial fibrillation ([facility] 01/13/2016     Priority: Medium    Acute on chronic diastolic congestive heart failure ([facility] 05/14/2021    Chronic obstructive pulmonary disease ([facility] 05/13/2021    SOB (shortness of breath) 05/13/2021    History of CVA (cerebrovascular accident) 12/01/2020    Hx of non-ST elevation myocardial infarction (NSTEMI) 12/01/2020    Headache     GERD (gastroesophageal reflux disease)     Acquired hypothyroidism     Restrictive lung disease     Bronchospasm     Postmenopausal state     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  VHD (valvular heart disease)     History of PTCA      Overview Note:     LAD,RCAX2 Ramus      Hyperlipidemia    Acute Hypoxic resp failure sec to Cardiogenic and noncardiogenic Pulmonary edema  Suspected ACS  RLL Pneumonia  Bilateral Ground glass opacity  Grade I Diastolic dysfunction    PLAN  1. Inhalers  2. Abx  3. F/u C&S  4. Keep sats > 92%  5. IV Heparin  6. Await cath  7. CXR in am  8. C/w present management  9.  F/u ECHO      Electronically signed by Freddy Luz MD on 5/14/2021 at 10:48 AM

## 2021-05-14 NOTE — PROGRESS NOTES
Troponin rising , spoke with family daughter and pt , explained recommend cardiac cath also debated medical management as 2nd option  They will call me onc stephey make up their mind  Treat as acs and chf  Hold eliquis  Heparin for now

## 2021-05-14 NOTE — CONSULTS
CARDIOLOGY CONSULT NOTE   Reason for consultation:  SOb/Abnormal troponin    Referring physician:  Kingston Wright MD     Primary care physician: Clarisa Conrad MD      Dear  Dr. Kingston Wright MD   Thanks for the consult. Chief Complaints :  Chief Complaint   Patient presents with    Shortness of Breath        History of present illness:Natalie is a 80 y. o.year old, pleasant lady who was seen in the ICU has been feeling more short of breath with lower extremity swelling for several days she went to see Dr. Ehsan Perez in the office today but due to ongoing chest pain and shortness of breath was sent in her preliminary lab work suggested abnormal troponin level which is rising, chest x-ray shows possible pneumonia BNP is elevated concerning for congestive heart failure on top of possible pneumonia. She says she has been having chest pressure pain for last few days yesterday was a bad 5 out of 10 in the past when she had CABG her main symptoms of shortness of breath. Currently denies any chest pain she is on BiPAP but she is unable to walk much due to shortness of breath. KG shows normal sinus rhythm she does have history atrial fibrillation on Eliquis. She had bypass surgery in 2016. Past medical history:    has a past medical history of A-fib ([address] Bronchospasm, CHF (congestive heart failure) ([address] COPD (chronic obstructive pulmonary disease) ([address] Essential hypertension, Family history of coronary artery disease, GERD (gastroesophageal reflux disease), H/O cardiovascular stress test, H/O echocardiogram, Headache, History of PTCA, Hx of cardiovascular stress test, Hx of echocardiogram, Hyperlipidemia, Obesity, Paroxysmal atrial fibrillation ([address] Postmenopausal state, Restrictive lung disease, and VHD (valvular heart disease). Past surgical history:   has a past surgical history that includes Cholecystectomy; Coronary angioplasty (2005, 1995); Thoracentesis (Left, 08/15/2016);  Cataract tablet 5 mg  5 mg Oral BID Kendrick Hurtado MD        aspirin chewable tablet 81 mg  81 mg Oral Daily Kendrick Hurtado MD        atorvastatin (LIPITOR) tablet 10 mg  10 mg Oral Daily Kendrick Hurtado MD        carvedilol (COREG) tablet 6.25 mg  6.25 mg Oral BID Kendrick Hurtado MD        levothyroxine (SYNTHROID) tablet 25 mcg  25 mcg Oral Daily Kendrick Hurtado MD   25 mcg at 05/14/21 0811    nitroGLYCERIN (NITROSTAT) SL tablet 0.4 mg  0.4 mg Sublingual Q5 Min PRN Kendrick Hurtado MD        budesonide-formoterol (SYMBICORT) 160-4.5 MCG/ACT inhaler 2 puff  2 puff Inhalation BID Oneil CANNON MD   2 puff at 05/14/21 0735    sodium chloride flush 0.9 % injection 10 mL  10 mL Intravenous 2 times per day Zuhair Rhodes MD        sodium chloride flush 0.9 % injection 10 mL  10 mL Intravenous PRN Kendrick Hurtado MD        0.9 % sodium chloride infusion  25 mL Intravenous PRN Kendrick Hurtado MD        promethazine (PHENERGAN) tablet 12.5 mg  12.5 mg Oral Q6H PRN Kendrick Hurtado MD        Or    ondansetron (ZOFRAN) injection 4 mg  4 mg Intravenous Q6H PRN Kendrick Hurtado MD        magnesium hydroxide (MILK OF MAGNESIA) 400 MG/5ML suspension 30 mL  30 mL Oral Daily PRN Kendrick Hurtado MD        acetaminophen (TYLENOL) tablet 650 mg  650 mg Oral Q6H PRN Kendrick Hurtado MD        Or    acetaminophen (TYLENOL) suppository 650 mg  650 mg Rectal Q6H PRN Kendrick Hurtado MD        [START ON 5/15/2021] cefTRIAXone (ROCEPHIN) 1000 mg IVPB in 50 mL D5W minibag  1,000 mg Intravenous Q24H Kendrick Hurtado MD        [START ON 5/15/2021] azithromycin (ZITHROMAX) 500 mg in dextrose 5 % 250 mL IVPB  500 mg Intravenous Q24H Kendrick Hurtado MD        [START ON 5/15/2021] predniSONE (DELTASONE) tablet 20 mg  20 mg Oral BID Kendrick Hurtado MD        furosemide (LASIX) injection 40 mg  40 mg Intravenous BID Gema Roland MD        sodium chloride flush 0.9 % injection 10 mL  10 mL Intravenous BID Navi Ozuna MD   10 mL at 05/13/21 2134     Review of Systems:   · Constitutional: No Fever or Weight Loss   · Eyes: No Decreased Vision  · ENT: No Headaches, Hearing Loss or Vertigo  · Cardiovascular: As per HPI  · Respiratory: As per HPI  · Gastrointestinal: No abdominal pain, appetite loss, blood in stools, constipation, diarrhea or heartburn  · Genitourinary: No dysuria, trouble voiding, or hematuria  · Musculoskeletal:  No gait disturbance, weakness or joint complaints  · Integumentary: No rash or pruritis  · Neurological: No TIA or stroke symptoms  · Psychiatric: No anxiety or depression  · Endocrine: No malaise, fatigue or temperature intolerance  · Hematologic/Lymphatic: No bleeding problems, blood clots or swollen lymph nodes  · Allergic/Immunologic: No nasal congestion or hives  All systems negative except as marked. Physical Examination:    Vitals:    05/14/21 0500 05/14/21 0600 05/14/21 0735 05/14/21 0800   BP: (!) 101/50 (!) 122/58  (!) 109/48   Pulse: 73 70  83   Resp: 18 15 19 18   Temp: 98.4 °F (36.9 °C)   98.3 °F (36.8 °C)   TempSrc: Oral   Oral   SpO2: 96% 96% 97% 96%   Weight:       Height:           General Appearance:  No distress, conversant    Constitutional:  Well developed, Well nourished, No acute distress, Non-toxic appearance. HENT:  Normocephalic, Atraumatic, Bilateral external ears normal, Oropharynx moist, No oral exudates, Nose normal. Neck- Normal range of motion, No tenderness, Supple, No stridor,no apical-carotid delay  Lymphatics : no palpable lymph nodes  Eyes:  PERRL, EOMI, Conjunctiva normal, No discharge. Respiratory:  Normal breath sounds, No respiratory distress, No wheezing, No chest tenderness. ,no use of accessory muscles, crackles Present  Cardiovascular: (PMI) apex non displaced,no lifts no thrills, ankle swelling Present , 1+, s1 and s2 audible,Murmur. Present, JVD not noted    Abdomen /GI:  Bowel sounds normal, Soft, No tenderness, No masses, No gross visceromegaly   :  No right.  Small right pleural effusion. No pneumothorax or left pleural effusion. Soft Tissues/Bones: Degenerative changes are present throughout the thoracic spine. There is a new chronic compression deformity of the T10 vertebral body with a vertebral plana appearance. Upper Abdomen: The visualized upper abdomen is unremarkable. No evidence of pulmonary embolism. Slight interval increase in size of the known in sending aortic aneurysm, now measuring 5.1 cm (previously 4.8 cm). Cardiomegaly. Small right pleural effusion. New moderate diffuse ground-glass opacities throughout the lungs bilaterally, somewhat asymmetric and more pronounced on the right. This may be due to pulmonary edema although active COVID-19 viral pneumonia could have a similar appearance in the appropriate setting. All labs, medications and tests reviewed by myself including data  from outside source , patient and available family . Continue all other medications of all above medical condition listed as is. Impression:  Active Problems:    SOB (shortness of breath)  Resolved Problems:    * No resolved hospital problems. *      Assessment: 80 y. o.year old with PMH of  has a past medical history of A-fib ([address] Bronchospasm, CHF (congestive heart failure) ([address] COPD (chronic obstructive pulmonary disease) ([address] Essential hypertension, Family history of coronary artery disease, GERD (gastroesophageal reflux disease), H/O cardiovascular stress test, H/O echocardiogram, Headache, History of PTCA, Hx of cardiovascular stress test, Hx of echocardiogram, Hyperlipidemia, Obesity, Paroxysmal atrial fibrillation ([address] Postmenopausal state, Restrictive lung disease, and VHD (valvular heart disease).       Plan and Recommendations:    Elevated Troponin : Check serial troponin hold anticoagulation in case we proceed with cardiac cath check serial troponin may need cardiac cath depending on clinical course  Keep n.p.o. we will debate stress versus cath depending on troponin and her clinical course  Acute decompensated heart failure with preserved EF agree with checking echo start Lasix 40 twice daily  HTN: stable, continue present medications   Pneumonia as per primary team  DVT prophylaxis if no contraindication  6. Dyslipidemia: continue statins           Thank you  much for consult and giving us the opportunity in contributing in the care of this patient. Please feel free to call me for any questions.        Jeny West MD, 5/14/2021 9:06 AM

## 2021-05-14 NOTE — CARE COORDINATION
Met with patient for discharge planning. Patient is up in the chair and able to participate. Daughter Mare Witt is present. Patient lives at home with daughter Mare Witt assisting with needs. Patient stated that she is independent but requires \"a little help now and then\". Daughter stated that patient has [employer] [facility]. She has notified them of admission. No skilled services thru [facility] at this time. Daughter is not interested in skilled care at this time. Plan is discharge to home; no needs.

## 2021-05-14 NOTE — H&P
HISTORY AND PHYSICAL  (Hospitalist, Internal Medicine)  IDENTIFYING INFORMATION   PATIENT:  Eulis Nageotte  MRN:  [id number]  ADMIT DATE: 5/13/2021  TIME OF EVALUATION: 5/13/2021 10:45 PM    CHIEF COMPLAINT     Shortness of breath  HISTORY OF PRESENT ILLNESS   Eulis Nageotte is a 80 y.o. female admitted for shortness of breath has been progressively getting worse for the last few weeks. Patient states that she was at her primary care doctor, and she had sudden episode of shortness of breath and chest pain. She has been describing this chest type discomfort as indigestion or gas, as per her daughter, which has not been new. However the intensity and severity was abrupt, along with the shortness of breath. Patient was directed to come to the ED. Patient does not have any associated fevers, cough, but does have associated bilateral lower extremity swelling has been getting worse. Granddaughter states that she has a history of congestive heart failure, but is not on any diuretics. Patient otherwise does not have any complaints at this time, and is breathing comfortably on BiPAP. PMH listed below:    PAST MEDICAL, SURGICAL, FAMILY, and SOCIAL HISTORY     Past Medical History:   Diagnosis Date    [facility]     Bronchospasm     Essential hypertension     Family history of coronary artery disease     GERD (gastroesophageal reflux disease)     H/O cardiovascular stress test 08/13/2013    EF 70%, no ischemia, normal perfusion pattern in all regions, normal study.     H/O echocardiogram 06/05/2019    EF 14-29%, Grade I diastolic dysfunction, Mild septal wall asymmetrical left ventricular hypertophy, sclerotic but non stenotic aortic vavle, mitral annular calcification, Mild AR,     Headache     History of PTCA 1995,10/7/05,10/25/05    LAD,RCAX2 Ramus    Hx of cardiovascular stress test     11/12/2009=normal perfusion EF 70%, pt c/o 6/10 chest pain during adenosine infusion;  03/2008=EF 65%; 02/2006=EF PLATELET  Chemistry  [unfilled]  [unfilled]  No results found for: LDH  Coagulation Studies  Lab Results   Component Value Date    INR 1.25 05/13/2021     Liver Function Studies  Lab Results   Component Value Date    ALT 13 05/13/2021    AST 17 05/13/2021    ALKPHOS 97 05/13/2021       Recent Imaging        Relevant labs and imaging reviewed    ASSESSMENT AND PLAN   Darion Foreman is a 80 y.o. female p/w    Acute hypoxic and hypercapnic respiratory failure possibly d/t Community-acquired pneumonia versus fluid overload, versus flash pulmonary edema  -Also has lower extremity edema and crackles, possibly fluid overload, however responding to BiPAP  -Stat Lasix 20 mg IV given  - CXR with infiltrates  - ceftriaxone and azithromycin   - sputum cuture, , legionella antigen, strep pneumonia antigen  -Respiratory viral panel  - PRN O2 via NC  - pulmonary consult   -Cardiology consult  -Echocardiogram  -Continue BiPAP      Acquired hypothyroidism  Synthroid    Paroxysmal atrial fibrillation  Eliquis  Coreg    Coronary artery disease involving coronary bypass graft of native heart without angina pectoris  Aspirin, Lipitor    Chronic obstructive pulmonary disease, likely not in acute exacerbation  Continue nebulizer as needed      Case d/w ED provider    DVT ppx: Eliquis  Code status: Full code    [facility], Internal Medicine  5/13/2021 at 10:45 PM

## 2021-05-15 ENCOUNTER — APPOINTMENT (OUTPATIENT)
Dept: ULTRASOUND IMAGING | Age: 86
DRG: 280 | End: 2021-05-15
Payer: MEDICARE

## 2021-05-15 ENCOUNTER — APPOINTMENT (OUTPATIENT)
Dept: GENERAL RADIOLOGY | Age: 86
DRG: 280 | End: 2021-05-15
Payer: MEDICARE

## 2021-05-15 PROCEDURE — 99232 SBSQ HOSP IP/OBS MODERATE 35: CPT | Performed by: INTERNAL MEDICINE

## 2021-05-15 PROCEDURE — 6370000000 HC RX 637 (ALT 250 FOR IP): Performed by: INTERNAL MEDICINE

## 2021-05-15 PROCEDURE — 2580000003 HC RX 258: Performed by: EMERGENCY MEDICINE

## 2021-05-15 PROCEDURE — 94640 AIRWAY INHALATION TREATMENT: CPT

## 2021-05-15 PROCEDURE — 93971 EXTREMITY STUDY: CPT

## 2021-05-15 PROCEDURE — 94761 N-INVAS EAR/PLS OXIMETRY MLT: CPT

## 2021-05-15 PROCEDURE — 1200000000 HC SEMI PRIVATE

## 2021-05-15 PROCEDURE — 6360000002 HC RX W HCPCS: Performed by: INTERNAL MEDICINE

## 2021-05-15 PROCEDURE — 94660 CPAP INITIATION&MGMT: CPT

## 2021-05-15 PROCEDURE — 71045 X-RAY EXAM CHEST 1 VIEW: CPT

## 2021-05-15 PROCEDURE — 99233 SBSQ HOSP IP/OBS HIGH 50: CPT | Performed by: INTERNAL MEDICINE

## 2021-05-15 PROCEDURE — 2580000003 HC RX 258: Performed by: INTERNAL MEDICINE

## 2021-05-15 PROCEDURE — 2700000000 HC OXYGEN THERAPY PER DAY

## 2021-05-15 RX ADMIN — SODIUM CHLORIDE, PRESERVATIVE FREE 10 ML: 5 INJECTION INTRAVENOUS at 09:03

## 2021-05-15 RX ADMIN — APIXABAN 5 MG: 5 TABLET, FILM COATED ORAL at 09:02

## 2021-05-15 RX ADMIN — ACETAMINOPHEN 650 MG: 325 TABLET ORAL at 21:47

## 2021-05-15 RX ADMIN — PREDNISONE 20 MG: 20 TABLET ORAL at 20:44

## 2021-05-15 RX ADMIN — AZITHROMYCIN MONOHYDRATE 500 MG: 500 INJECTION, POWDER, LYOPHILIZED, FOR SOLUTION INTRAVENOUS at 18:34

## 2021-05-15 RX ADMIN — APIXABAN 5 MG: 5 TABLET, FILM COATED ORAL at 20:44

## 2021-05-15 RX ADMIN — LEVOTHYROXINE SODIUM 25 MCG: 25 TABLET ORAL at 06:32

## 2021-05-15 RX ADMIN — FUROSEMIDE 40 MG: 10 INJECTION, SOLUTION INTRAVENOUS at 17:30

## 2021-05-15 RX ADMIN — BUDESONIDE AND FORMOTEROL FUMARATE DIHYDRATE 2 PUFF: 160; 4.5 AEROSOL RESPIRATORY (INHALATION) at 07:45

## 2021-05-15 RX ADMIN — PREDNISONE 20 MG: 20 TABLET ORAL at 09:02

## 2021-05-15 RX ADMIN — SODIUM CHLORIDE, PRESERVATIVE FREE 10 ML: 5 INJECTION INTRAVENOUS at 20:44

## 2021-05-15 RX ADMIN — CARVEDILOL 6.25 MG: 6.25 TABLET, FILM COATED ORAL at 20:44

## 2021-05-15 RX ADMIN — CARVEDILOL 6.25 MG: 6.25 TABLET, FILM COATED ORAL at 09:02

## 2021-05-15 RX ADMIN — FUROSEMIDE 40 MG: 10 INJECTION, SOLUTION INTRAVENOUS at 09:02

## 2021-05-15 RX ADMIN — ASPIRIN 81 MG: 81 TABLET, CHEWABLE ORAL at 09:02

## 2021-05-15 RX ADMIN — BUDESONIDE AND FORMOTEROL FUMARATE DIHYDRATE 2 PUFF: 160; 4.5 AEROSOL RESPIRATORY (INHALATION) at 19:32

## 2021-05-15 RX ADMIN — SODIUM CHLORIDE, PRESERVATIVE FREE 10 ML: 5 INJECTION INTRAVENOUS at 09:02

## 2021-05-15 RX ADMIN — CEFTRIAXONE SODIUM 1000 MG: 1 INJECTION, POWDER, FOR SOLUTION INTRAMUSCULAR; INTRAVENOUS at 18:34

## 2021-05-15 RX ADMIN — ATORVASTATIN CALCIUM 10 MG: 10 TABLET, FILM COATED ORAL at 09:02

## 2021-05-15 ASSESSMENT — PAIN SCALES - GENERAL
PAINLEVEL_OUTOF10: 6
PAINLEVEL_OUTOF10: 0
PAINLEVEL_OUTOF10: 2
PAINLEVEL_OUTOF10: 0

## 2021-05-15 ASSESSMENT — PAIN DESCRIPTION - PROGRESSION
CLINICAL_PROGRESSION: NOT CHANGED

## 2021-05-15 NOTE — PROGRESS NOTES
Physician Progress Note      PATIENT:               Laure George  CSN #:                  [id number]  :                       1933  ADMIT DATE:       2021 5:20 PM  [address] [place] DATE:  RESPONDING  PROVIDER #:        Laurel Garduno MD          QUERY TEXT:    Dear Hospitalist,    Pt admitted with acute respiratory failure and has CHF documented. If   possible, please document in progress notes and discharge summary further   specificity regarding the type and acuity of CHF:    The medical record reflects the following:  Risk Factors: Hx of chf  Clinical Indicators: \"H&P noted \"Granddaughter states that she has a history   of congestive heart failure, but is not on any diuretics\" BNP 5,131, Dr. Albertina Dooley   noted in ED provider notes \"Markedly increased work of   breathing. ....... Wu Martinez Moderately diminished aeration throughout. Faint end   expiratory wheezes noted anteriorly. Crackles throughout   anteriorly. ........... Wu Michelle 1 or 2+ lightly pitting, symmetric edema\", pulmonary   CTA on  \"Cardiomegaly. Small right pleural effusion. \"  Treatment: CXR, pulmonary CTA, Bipap, IV Lasix 20 mg once, echo, daily   weights, I&O    Thank you,  LENY Del AngelN, RN, CDS  [phone]  Options provided:  -- Acute on Chronic Systolic CHF/HFrEF  -- Acute on Chronic Diastolic CHF/HFpEF  -- Acute on Chronic Systolic and Diastolic CHF  -- Chronic Systolic CHF/HFrEF  -- Chronic Diastolic CHF/HFpEF  -- Chronic Systolic and Diastolic CHF  -- Other - I will add my own diagnosis  -- Disagree - Not applicable / Not valid  -- Disagree - Clinically unable to determine / Unknown  -- Refer to Clinical Documentation Reviewer    PROVIDER RESPONSE TEXT:    This patient is in diastolic CHF/HFpEF exacerbation.     Query created by: Tiffany Patino on 2021 7:38 AM      Electronically signed by:  Laurel Garduno MD 2021 8:34 PM

## 2021-05-15 NOTE — PROGRESS NOTES
Pulmonary and Critical Care  Progress Note      VITALS:  /64   Pulse 72   Temp 98.7 °F (37.1 °C) (Oral)   Resp 26   Ht 4' 11\" (1.499 m)   Wt 168 lb 6.9 oz (76.4 kg)   SpO2 97%   BMI 34.02 kg/m²     Subjective:   CHIEF COMPLAINT :SOB     HPI:                The patient is lying in the bed. She is in mild resp distress    Objective:   PHYSICAL EXAM:    LUNGS:Occasional basal crackles  Abd-soft, BS+,NT  Ext- no pedal edema  CVS-s1s2,no murmurs      DATA:    CBC:  Recent Labs     05/13/21 1737 05/14/21  0310   WBC 9.6 7.0   RBC 4.01* 3.89*   HGB 12.7 12.0*   HCT 40.8 38.2    222   .7* 98.2   MCH 31.7* 30.8   MCHC 31.1* 31.4*   RDW 13.3 13.2   SEGSPCT 34.0* 82.5*      BMP:  Recent Labs     05/13/21 1737 05/14/21  0310    136   K 4.4 4.8   CL 98* 99   CO2 26 28   BUN 22 21   CREATININE 1.0 0.9   CALCIUM 9.9 9.6   GLUCOSE 231* 106*      ABG:  No results for input(s): PH, PO2ART, GFY4RJZ, HCO3, BEART, O2SAT in the last 72 hours. BNP  Lab Results   Component Value Date    BNP 40.6 01/28/2013      D-Dimer:  Lab Results   Component Value Date    DDIMER 402 (H) 05/13/2021      Radiology:   Cardiomegaly, bilateral airspace opacities and right pleural effusion   suggesting pulmonary edema.      1.       Assessment/Plan     Patient Active Problem List    Diagnosis Date Noted    Expressive aphasia 01/21/2016     Priority: Medium    Paroxysmal atrial fibrillation ([facility] 01/13/2016     Priority: Medium    Acute on chronic diastolic congestive heart failure ([facility] [address] [facility] 05/14/2021    Pneumonia of both lungs due to infectious organism     Chronic obstructive pulmonary disease ([facility] [address] [facility] 05/13/2021    SOB (shortness of breath) 05/13/2021    History of CVA (cerebrovascular accident) 12/01/2020    Hx of non-ST elevation myocardial infarction (NSTEMI) 12/01/2020    Headache     GERD (gastroesophageal reflux disease)     Acquired hypothyroidism     Restrictive lung disease     Bronchospasm     Postmenopausal state     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  VHD (valvular heart disease)     History of PTCA      Overview Note:     LAD,RCAX2 Ramus      Hyperlipidemia      Acute Hypoxic resp failure sec to Cardiogenic and noncardiogenic Pulmonary edema  Suspected ACS  RLL Pneumonia  Bilateral Ground glass opacity  Grade I Diastolic dysfunction  Small Right pleural effusion     1. Diuresis  2. Inhalers  3. Abx  4. F/u C&S  5. Keep sats > 92%  6. ICS  7. C/w present management  No follow-ups on file.     Electronically signed by Edouard Ruth MD on 5/15/2021 at 11:00 AM

## 2021-05-15 NOTE — PROGRESS NOTES
Hospitalist Progress Note      Name:  Saul Santos /Age/Sex: 1933  (80 y.o. female)   MRN & CSN:  [id number] & [id number] Admission Date/Time: 2021  5:20 PM   Location:   PCP: Teagan Maldonado MD         Hospital Day: 3    History of Present Illness:     Chief Complaint: Saul Santos is a 80 y.o.  female  who presents with SOB     The patient seen and examined at bed side. She denies any chest pain or SOB. Ten point ROS reviewed negative, unless as noted above    Objective:        Intake/Output Summary (Last 24 hours) at 5/15/2021 1238  Last data filed at 5/15/2021 0002  Gross per 24 hour   Intake 720 ml   Output 1975 ml   Net -1255 ml      Vitals:   Vitals:    05/15/21 1100   BP: (!) 128/44   Pulse: 74   Resp: 24   Temp:    SpO2: 97%     Physical Exam:   General Appearance: alert and oriented to person, place and time, in no acute distress  Cardiovascular: normal rate, regular rhythm, normal S1 and S2  Pulmonary/Chest: Bilateral crackles  Abdomen: soft, non-tender, non-distended, normal bowel sounds, no masses   Extremities: Bilateral lower extremity pitting edema  Skin: warm and dry, no rash or erythema  Head: normocephalic and atraumatic  Eyes: pupils equal, round, and reactive to light  Neck: supple and non-tender without mass, no thyromegaly   Musculoskeletal: normal range of motion, no joint swelling, deformity or tenderness  Neurological: alert, oriented, normal speech, no focal findings or movement disorder noted    Medications:   Medications:    apixaban  5 mg Oral BID    aspirin  81 mg Oral Daily    atorvastatin  10 mg Oral Daily    carvedilol  6.25 mg Oral BID    levothyroxine  25 mcg Oral Daily    budesonide-formoterol  2 puff Inhalation BID    sodium chloride flush  10 mL Intravenous 2 times per day    cefTRIAXone (ROCEPHIN) IV  1,000 mg Intravenous Q24H    azithromycin  500 mg Intravenous Q24H    predniSONE  20 mg Oral BID    furosemide  40 mg Intravenous BID    sodium chloride flush  10 mL Intravenous BID      Infusions:    sodium chloride       PRN Meds: albuterol, 5 mg, 4x Daily PRN  nitroGLYCERIN, 0.4 mg, Q5 Min PRN  sodium chloride flush, 10 mL, PRN  sodium chloride, 25 mL, PRN  promethazine, 12.5 mg, Q6H PRN   Or  ondansetron, 4 mg, Q6H PRN  magnesium hydroxide, 30 mL, Daily PRN  acetaminophen, 650 mg, Q6H PRN   Or  acetaminophen, 650 mg, Q6H PRN            Pertinent New Labs & Imaging Studies     CBC with Differential:    Lab Results   Component Value Date    WBC 7.0 05/14/2021    RBC 3.89 05/14/2021    HGB 12.0 05/14/2021    HCT 38.2 05/14/2021     05/14/2021    MCV 98.2 05/14/2021    MCH 30.8 05/14/2021    MCHC 31.4 05/14/2021    RDW 13.2 05/14/2021    SEGSPCT 82.5 05/14/2021    LYMPHOPCT 13.9 05/14/2021    MONOPCT 3.1 05/14/2021    EOSPCT 3.5 05/25/2017    BASOPCT 0.1 05/14/2021    MONOSABS 0.2 05/14/2021    LYMPHSABS 1.0 05/14/2021    EOSABS 0.0 05/14/2021    BASOSABS 0.0 05/14/2021    DIFFTYPE AUTOMATED DIFFERENTIAL 05/14/2021     CMP:    Lab Results   Component Value Date     05/14/2021    K 4.8 05/14/2021    CL 99 05/14/2021    CO2 28 05/14/2021    BUN 21 05/14/2021    CREATININE 0.9 05/14/2021    GFRAA >60 05/14/2021    AGRATIO 1.1 07/31/2019    LABGLOM 59 05/14/2021    GLUCOSE 106 05/14/2021    PROT 6.7 05/14/2021    LABALBU 3.9 05/14/2021    CALCIUM 9.6 05/14/2021    BILITOT 0.2 05/14/2021    ALKPHOS 83 05/14/2021    AST 27 05/14/2021    ALT 13 05/14/2021     Xr Chest Portable    Result Date: 5/13/2021  EXAMINATION: ONE XRAY VIEW OF THE CHEST 5/13/2021 6:17 pm COMPARISON: CT chest May 15, 2020; chest x-ray May 15, 2020 HISTORY: ORDERING SYSTEM PROVIDED HISTORY: Respiratory distress TECHNOLOGIST PROVIDED HISTORY: Reason for exam:->Respiratory distress Reason for Exam: Respiratory distress Acuity: Acute Type of Exam: Initial Additional signs and symptoms: none Relevant Medical/Surgical History: A-fib, GERD, bronchospasm up, family debating at this time. Patient refused to do heart cath.   Continue empiric antibiotics  Continue Coreg  Echocardiogram with EF 50% with Grade I DD  Continue as needed BiPAP and Oxygen support     Lactic acidosis-Resolved  Likely with Hypoxia    Acquired hypothyroidism  Continue Synthroid     Paroxysmal atrial fibrillation  Holding Eliquis, currently on Heparin drip  Coreg     Coronary artery disease involving coronary bypass graft of native heart without angina pectoris  Aspirin, Lipitor     Chronic obstructive pulmonary disease, likely not in acute exacerbation  Continue nebulizer as needed    Left lower extremity swelling and tenderness, negative DVT US    Okay to med/Surg  Diet DIET CARDIAC;   DVT Prophylaxis [] Lovenox, [x]  Heparin, [] SCDs, [] Ambulation   GI Prophylaxis [] PPI,  [] H2 Blocker,  [] Carafate,  [x] Diet/Tube Feeds   Code Status DNR-CCA   Disposition Patient requires continued admission due to NSTEMI   MDM [] Low, [] Moderate,[]  High     Electronically signed by Claribel Gallego MD on 5/15/2021 at 12:38 PM

## 2021-05-15 NOTE — PROGRESS NOTES
CARDIOLOGY PROGRESS NOTE                                                  Name:  Abran Mosquera /Age/Sex: 1933  (80 y.o. female)   MRN & CSN:  [id number] & [id number] Admission Date/Time: 2021  5:20 PM   Location:  - PCP: Kaitlynn Avila MD         Admit Date:  2021  Hospital Day: 3      SUBJECTIVE:   Seen patient as follow up as consultation for Elevated troponins CAD     No chest pain. No shortness of breath  No palpations    TELEMETRY: Sinus       Intake/Output Summary (Last 24 hours) at 5/15/2021 1429  Last data filed at 5/15/2021 0002  Gross per 24 hour   Intake 480 ml   Output 1975 ml   Net -1495 ml       Assessment/Plan:     1. Non-ST elevation MI: Discussed with the patient. Patient refusing left heart cath. Continue with conservative medical management. 2. Acute hypoxic/hypercapnic respiratory failure likely secondary to pneumonia. Continue with IV antibiotics  3. Congestive heart failure, diastolic. Negative fluid balance. Continue with Lasix. 4. Paroxysmal atrial fibrillation. Oral Eliquis can be restarted if no other's procedures planned. Currently rate controlled. Continue with Coreg. 5. Moderate aortic insufficiency: Continue with diuretics. IV Lasix 40 twice daily. 6. Coronary artery disease: Stable. Continue with aspirin/statins. Past medical history:    has a past medical history of A-fib ([address] Bronchospasm, CHF (congestive heart failure) ([address] COPD (chronic obstructive pulmonary disease) ([address] Essential hypertension, Family history of coronary artery disease, GERD (gastroesophageal reflux disease), H/O cardiovascular stress test, H/O echocardiogram, Headache, History of PTCA, Hx of cardiovascular stress test, Hx of echocardiogram, Hyperlipidemia, Obesity, Paroxysmal atrial fibrillation ([address] Postmenopausal state, Restrictive lung disease, and VHD (valvular heart disease).   Past surgical history:   has a past surgical history that includes Cholecystectomy; Coronary angioplasty (2005, 1995); Thoracentesis (Left, 08/15/2016); Cataract removal with implant (Bilateral, 2006); and Coronary artery bypass graft (07/2016). Social History:   reports that she has never smoked. She has never used smokeless tobacco. She reports that she does not drink alcohol and does not use drugs. Family history:  family history includes Colon Cancer in an other family member; Heart Disease in her father, sister, and sister; Heart Failure in her mother; Heart Surgery in her sister; Rheum Arthritis in her sister. OBJECTIVE:     BP (!) 81/47   Pulse 70   Temp 98.8 °F (37.1 °C) (Oral)   Resp 18   Ht 4' 11\" (1.499 m)   Wt 168 lb 6.9 oz (76.4 kg)   SpO2 95%   BMI 34.02 kg/m²       Intake/Output Summary (Last 24 hours) at 5/15/2021 1429  Last data filed at 5/15/2021 0002  Gross per 24 hour   Intake 480 ml   Output 1975 ml   Net -1495 ml       Physical Exam:    Constitutional:  Well developed, Well nourished, No acute distress, Non-toxic appearance. HENT:  Normocephalic, Atraumatic, Bilateral external ears normal, Oropharynx moist, No oral exudates, Nose normal. Neck- Normal range of motion, No tenderness, Supple, No stridor. Eyes:  EOMI, Conjunctiva normal, No discharge. Respiratory:  Normal breath sounds, No respiratory distress, No wheezing, No chest tenderness. ,no use of accessory muscles, diaphragm movement is normal  Cardiovascular S1-S2 No murmurs, added sounds. Normal rate rhythm. No rubs gallops. Carotid pulses and amplitude are normal no bruit noted. Pedal pulses normal femoral pulses normal.  No pedal edema  GI:  Bowel sounds normal, Soft, No tenderness  : No CVA tenderness. Musculoskeletal: No edema, No tenderness, No cyanosis, No clubbing. Back- No tenderness. Integument:  Warm, Dry, No erythema, No rash. Lymphatic:  No lymphadenopathy noted. Neurologic:  Alert & oriented x 3, No focal deficits noted.    Psychiatric:  Affect normal, Judgment normal, Mood normal.           MEDICATIONS:     apixaban  5 mg Oral BID    aspirin  81 mg Oral Daily    atorvastatin  10 mg Oral Daily    carvedilol  6.25 mg Oral BID    levothyroxine  25 mcg Oral Daily    budesonide-formoterol  2 puff Inhalation BID    sodium chloride flush  10 mL Intravenous 2 times per day    cefTRIAXone (ROCEPHIN) IV  1,000 mg Intravenous Q24H    azithromycin  500 mg Intravenous Q24H    predniSONE  20 mg Oral BID    furosemide  40 mg Intravenous BID    sodium chloride flush  10 mL Intravenous BID      sodium chloride       albuterol, nitroGLYCERIN, sodium chloride flush, sodium chloride, promethazine **OR** ondansetron, magnesium hydroxide, acetaminophen **OR** acetaminophen  Allergies   Allergen Reactions    Sulfa Antibiotics Other (See Comments)     Cannot remember; rash per paper chart    Zoloft [Sertraline Hcl] Other (See Comments)     Sherrell          Lab Data:  CBC:   Recent Labs     05/13/21 1737 05/14/21 0310   WBC 9.6 7.0   HGB 12.7 12.0*   HCT 40.8 38.2   .7* 98.2    222     BMP:   Recent Labs     05/13/21 1737 05/14/21 0310    136   K 4.4 4.8   CL 98* 99   CO2 26 28   BUN 22 21   CREATININE 1.0 0.9     LIVER PROFILE:   Recent Labs     05/13/21 1737 05/14/21 0310   AST 17 27   ALT 13 13   BILITOT 0.2 0.2   ALKPHOS 97 83     PT/INR:   Recent Labs     05/13/21 1737   PROTIME 15.1*   INR 1.25     APTT:   Recent Labs     05/13/21 1737   APTT 32.5     BNP:  No results for input(s): BNP in the last 72 hours.        Roldan Salazar MD, MD 5/15/2021 2:29 PM

## 2021-05-16 VITALS
SYSTOLIC BLOOD PRESSURE: 153 MMHG | RESPIRATION RATE: 16 BRPM | OXYGEN SATURATION: 91 % | DIASTOLIC BLOOD PRESSURE: 66 MMHG | HEART RATE: 78 BPM | HEIGHT: 59 IN | BODY MASS INDEX: 33.96 KG/M2 | WEIGHT: 168.43 LBS | TEMPERATURE: 97.9 F

## 2021-05-16 LAB — GLUCOSE BLD-MCNC: 114 MG/DL (ref 70–99)

## 2021-05-16 PROCEDURE — 99231 SBSQ HOSP IP/OBS SF/LOW 25: CPT | Performed by: INTERNAL MEDICINE

## 2021-05-16 PROCEDURE — 99233 SBSQ HOSP IP/OBS HIGH 50: CPT | Performed by: INTERNAL MEDICINE

## 2021-05-16 PROCEDURE — 94761 N-INVAS EAR/PLS OXIMETRY MLT: CPT

## 2021-05-16 PROCEDURE — 82962 GLUCOSE BLOOD TEST: CPT

## 2021-05-16 PROCEDURE — 2580000003 HC RX 258: Performed by: INTERNAL MEDICINE

## 2021-05-16 PROCEDURE — 6370000000 HC RX 637 (ALT 250 FOR IP): Performed by: INTERNAL MEDICINE

## 2021-05-16 PROCEDURE — 94640 AIRWAY INHALATION TREATMENT: CPT

## 2021-05-16 PROCEDURE — 94660 CPAP INITIATION&MGMT: CPT

## 2021-05-16 PROCEDURE — 6370000000 HC RX 637 (ALT 250 FOR IP): Performed by: NURSE PRACTITIONER

## 2021-05-16 PROCEDURE — APPSS60 APP SPLIT SHARED TIME 46-60 MINUTES: Performed by: NURSE PRACTITIONER

## 2021-05-16 PROCEDURE — 6360000002 HC RX W HCPCS: Performed by: INTERNAL MEDICINE

## 2021-05-16 RX ORDER — FUROSEMIDE 40 MG/1
40 TABLET ORAL 2 TIMES DAILY
Status: DISCONTINUED | OUTPATIENT
Start: 2021-05-16 | End: 2021-05-16 | Stop reason: HOSPADM

## 2021-05-16 RX ORDER — CARVEDILOL 12.5 MG/1
TABLET ORAL
Qty: 60 TABLET | Refills: 1 | Status: ON HOLD | OUTPATIENT
Start: 2021-05-16 | End: 2021-06-20 | Stop reason: SDUPTHER

## 2021-05-16 RX ORDER — GUAIFENESIN/DEXTROMETHORPHAN 100-10MG/5
5 SYRUP ORAL EVERY 4 HOURS PRN
Status: DISCONTINUED | OUTPATIENT
Start: 2021-05-16 | End: 2021-05-16 | Stop reason: HOSPADM

## 2021-05-16 RX ORDER — FUROSEMIDE 20 MG/1
20 TABLET ORAL DAILY
Qty: 30 TABLET | Refills: 3 | Status: ON HOLD | OUTPATIENT
Start: 2021-05-16 | End: 2021-06-20 | Stop reason: SDUPTHER

## 2021-05-16 RX ORDER — CARVEDILOL 12.5 MG/1
12.5 TABLET ORAL 2 TIMES DAILY
Status: DISCONTINUED | OUTPATIENT
Start: 2021-05-16 | End: 2021-05-16 | Stop reason: HOSPADM

## 2021-05-16 RX ORDER — PREDNISONE 10 MG/1
TABLET ORAL
Qty: 30 TABLET | Refills: 0 | Status: SHIPPED | OUTPATIENT
Start: 2021-05-16 | End: 2021-05-26

## 2021-05-16 RX ORDER — CEFDINIR 300 MG/1
300 CAPSULE ORAL 2 TIMES DAILY
Qty: 6 CAPSULE | Refills: 0 | Status: SHIPPED | OUTPATIENT
Start: 2021-05-16 | End: 2021-05-19

## 2021-05-16 RX ADMIN — ATORVASTATIN CALCIUM 10 MG: 10 TABLET, FILM COATED ORAL at 08:46

## 2021-05-16 RX ADMIN — ASPIRIN 81 MG: 81 TABLET, CHEWABLE ORAL at 08:46

## 2021-05-16 RX ADMIN — GUAIFENESIN AND DEXTROMETHORPHAN 5 ML: 100; 10 SYRUP ORAL at 11:15

## 2021-05-16 RX ADMIN — PREDNISONE 20 MG: 20 TABLET ORAL at 08:46

## 2021-05-16 RX ADMIN — BUDESONIDE AND FORMOTEROL FUMARATE DIHYDRATE 2 PUFF: 160; 4.5 AEROSOL RESPIRATORY (INHALATION) at 08:08

## 2021-05-16 RX ADMIN — FUROSEMIDE 40 MG: 10 INJECTION, SOLUTION INTRAVENOUS at 08:46

## 2021-05-16 RX ADMIN — APIXABAN 5 MG: 5 TABLET, FILM COATED ORAL at 08:46

## 2021-05-16 RX ADMIN — CARVEDILOL 12.5 MG: 12.5 TABLET, FILM COATED ORAL at 08:46

## 2021-05-16 RX ADMIN — ACETAMINOPHEN 650 MG: 325 TABLET ORAL at 04:00

## 2021-05-16 RX ADMIN — LEVOTHYROXINE SODIUM 25 MCG: 25 TABLET ORAL at 06:56

## 2021-05-16 RX ADMIN — SODIUM CHLORIDE, PRESERVATIVE FREE 10 ML: 5 INJECTION INTRAVENOUS at 08:46

## 2021-05-16 ASSESSMENT — PAIN SCALES - GENERAL
PAINLEVEL_OUTOF10: 0
PAINLEVEL_OUTOF10: 0
PAINLEVEL_OUTOF10: 7

## 2021-05-16 NOTE — PROGRESS NOTES
CARDIOLOGY PROGRESS NOTE                                                  Name:  Quin Horn /Age/Sex: 1933  (80 y.o. female)   MRN & CSN:  [id number] & [id number] Admission Date/Time: 2021  5:20 PM   Location:  [address] [place] [address] PCP: Kiesha Knight MD         Admit Date:  2021  Hospital Day: 4      SUBJECTIVE:   Seen patient as follow up as consultation for Elevated troponins CAD     No chest pain. No shortness of breath  No palpations    Patient states that she is feeling well and that she is going home today. TELEMETRY: Sinus       Intake/Output Summary (Last 24 hours) at 2021 1049  Last data filed at 5/15/2021 1923  Gross per 24 hour   Intake --   Output 700 ml   Net -700 ml       Assessment/Plan:     1. Non-ST elevation MI: Discussed with the patient. Patient refusing left heart cath. Continue with conservative medical management. 2. Acute hypoxic/hypercapnic respiratory failure likely secondary to pneumonia. Change lasix to PO.   3. Congestive heart failure- diastolic. Negative fluid balance. Continue with Lasix. Continue coreg  4. Paroxysmal atrial fibrillation:  Currently rate controlled. Continue with Coreg. Continue eliquis. 5. Moderate aortic insufficiency: Continue with diuretics. IV Lasix 40 twice daily. 6. Coronary artery disease: Stable. Continue with aspirin/statins. Past medical history:    has a past medical history of A-fib ([facility] [address] Bronchospasm, CHF (congestive heart failure) ([address] COPD (chronic obstructive pulmonary disease) ([facility] [address] Essential hypertension, Family history of coronary artery disease, GERD (gastroesophageal reflux disease), H/O cardiovascular stress test, H/O echocardiogram, Headache, History of PTCA, Hx of cardiovascular stress test, Hx of echocardiogram, Hyperlipidemia, Obesity, Paroxysmal atrial fibrillation ([address] Postmenopausal state, Restrictive lung disease, and VHD (valvular heart disease).   Past surgical history:   has a past surgical history that includes Cholecystectomy; Coronary angioplasty (2005, 1995); Thoracentesis (Left, 08/15/2016); Cataract removal with implant (Bilateral, 2006); and Coronary artery bypass graft (07/2016). Social History:   reports that she has never smoked. She has never used smokeless tobacco. She reports that she does not drink alcohol and does not use drugs. Family history:  family history includes Colon Cancer in an other family member; Heart Disease in her father, sister, and sister; Heart Failure in her mother; Heart Surgery in her sister; Rheum Arthritis in her sister. OBJECTIVE:     BP (!) 153/66   Pulse 78   Temp 97.9 °F (36.6 °C) (Oral)   Resp 16   Ht 4' 11\" (1.499 m)   Wt 168 lb 6.9 oz (76.4 kg)   SpO2 91%   BMI 34.02 kg/m²       Intake/Output Summary (Last 24 hours) at 5/16/2021 1049  Last data filed at 5/15/2021 1923  Gross per 24 hour   Intake --   Output 700 ml   Net -700 ml       Physical Exam  Vitals reviewed. Exam conducted with a chaperone present (daughter). Constitutional:       General: She is not in acute distress. Appearance: Normal appearance. She is obese. She is not ill-appearing. HENT:      Head: Atraumatic. Neck:      Vascular: No carotid bruit. Cardiovascular:      Rate and Rhythm: Normal rate and regular rhythm. Pulses: Normal pulses. Heart sounds: Normal heart sounds. No murmur heard. Pulmonary:      Effort: Pulmonary effort is normal. No respiratory distress. Breath sounds: Wheezing present. Musculoskeletal:         General: No swelling or deformity. Cervical back: Neck supple. No muscular tenderness. Neurological:      Mental Status: She is alert.          MEDICATIONS:     carvedilol  12.5 mg Oral BID    apixaban  5 mg Oral BID    aspirin  81 mg Oral Daily    atorvastatin  10 mg Oral Daily    levothyroxine  25 mcg Oral Daily    budesonide-formoterol  2 puff Inhalation BID    sodium chloride flush  10 mL Intravenous 2 times per day    cefTRIAXone (ROCEPHIN) IV  1,000 mg Intravenous Q24H    azithromycin  500 mg Intravenous Q24H    predniSONE  20 mg Oral BID    furosemide  40 mg Intravenous BID    sodium chloride flush  10 mL Intravenous BID      sodium chloride       albuterol, nitroGLYCERIN, sodium chloride flush, sodium chloride, promethazine **OR** ondansetron, magnesium hydroxide, acetaminophen **OR** acetaminophen  Allergies   Allergen Reactions    Sulfa Antibiotics Other (See Comments)     Cannot remember; rash per paper chart    Zoloft [Sertraline Hcl] Other (See Comments)     Sherrell          Lab Data:  CBC:   Recent Labs     05/13/21 1737 05/14/21 0310   WBC 9.6 7.0   HGB 12.7 12.0*   HCT 40.8 38.2   .7* 98.2    222     BMP:   Recent Labs     05/13/21 1737 05/14/21 0310    136   K 4.4 4.8   CL 98* 99   CO2 26 28   BUN 22 21   CREATININE 1.0 0.9     LIVER PROFILE:   Recent Labs     05/13/21 1737 05/14/21 0310   AST 17 27   ALT 13 13   BILITOT 0.2 0.2   ALKPHOS 97 83     PT/INR:   Recent Labs     05/13/21 1737   PROTIME 15.1*   INR 1.25     APTT:   Recent Labs     05/13/21 1737   APTT 32.5     BNP:  No results for input(s): BNP in the last 72 hours. Electronically signed by OSIRIS Payne CNP on 5/16/2021 at [place] [id number]    I have seen, spoken to and examined this patient personally, independently of the nurse practitioner. I have reviewed the hospital care given to date and reviewed all pertinent labs and imaging. The plan was developed mutually at the time of the visit with the patient,  NP   and myself. I have spoken with patient, nursing staff and provided written and verbal instructions . The above note has been reviewed and I agree with the assessment, diagnosis, and treatment plan with changes made by me as follows       HPI:  I have reviewed the above HPI  And agree with above   Please review addendum/changes made to note above Interval history:            Physical Exam:  General:  Awake, alert, NAD  Head:normal  Eye:normal  Neck:  No JVD   Chest:  Clear to auscultation, respiration easy  Cardiovascular:  s1s2  Abdomen:   nontender  Extremities:  no edema  Pulses; palpable  Neuro: grossly normal      MEDICAL DECISION MAKING;    I agree with the above plan, which was planned by myself and discussed with NP.     Patient can be discharged home today  Continue with medical therapy  Outpatient follow-up  Case was discussed with hospitalist    Dr. Cristhian Pleitez MD

## 2021-05-16 NOTE — PROGRESS NOTES
This RT went to [address] [place] [address] to see if Ms. Callahan required the BiPAP at present. I had spoken to her earlier and she wasn't sure if she wanted to/would be willing to wear it. Her RN informed me she was saturating 94 percent on RA. (After talking with him I did not visit the patient.)  I will continue to monitor her closely.

## 2021-05-16 NOTE — PROGRESS NOTES
Pulmonary and Critical Care  Progress Note      VITALS:  BP (!) 153/66   Pulse 78   Temp 97.9 °F (36.6 °C) (Oral)   Resp 16   Ht 4' 11\" (1.499 m)   Wt 168 lb 6.9 oz (76.4 kg)   SpO2 91%   BMI 34.02 kg/m²     Subjective:   CHIEF COMPLAINT :SOB     HPI:                The patient is lying in the bed. She is not in acute resp distress    Objective:   PHYSICAL EXAM:    LUNGS:Occasional basal crackles  Abd-soft, BS+,NT  Ext- no pedal edema  CVS-s1s2,no murmurs      DATA:    CBC:  Recent Labs     05/13/21  1737 05/14/21  0310   WBC 9.6 7.0   RBC 4.01* 3.89*   HGB 12.7 12.0*   HCT 40.8 38.2    222   .7* 98.2   MCH 31.7* 30.8   MCHC 31.1* 31.4*   RDW 13.3 13.2   SEGSPCT 34.0* 82.5*      BMP:  Recent Labs     05/13/21 1737 05/14/21  0310    136   K 4.4 4.8   CL 98* 99   CO2 26 28   BUN 22 21   CREATININE 1.0 0.9   CALCIUM 9.9 9.6   GLUCOSE 231* 106*      ABG:  No results for input(s): PH, PO2ART, WRH6TPB, HCO3, BEART, O2SAT in the last 72 hours. BNP  Lab Results   Component Value Date    BNP 40.6 01/28/2013      D-Dimer:  Lab Results   Component Value Date    DDIMER 402 (H) 05/13/2021      1.  Radiology: None      Assessment/Plan     Patient Active Problem List    Diagnosis Date Noted    Expressive aphasia 01/21/2016     Priority: Medium    Paroxysmal atrial fibrillation ([facility] [address] 01/13/2016     Priority: Medium    Acute on chronic diastolic congestive heart failure ([address] 05/14/2021    Pneumonia of both lungs due to infectious organism     Chronic obstructive pulmonary disease ([facility] [address] 05/13/2021    SOB (shortness of breath) 05/13/2021    History of CVA (cerebrovascular accident) 12/01/2020    Hx of non-ST elevation myocardial infarction (NSTEMI) 12/01/2020    Headache     GERD (gastroesophageal reflux disease)     Acquired hypothyroidism     Restrictive lung disease     Bronchospasm     Postmenopausal state     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

## 2021-05-16 NOTE — DISCHARGE SUMMARY
pulmonary    Significant Diagnostic Studies:   CBC with Differential:    Lab Results   Component Value Date    WBC 7.0 05/14/2021    RBC 3.89 05/14/2021    HGB 12.0 05/14/2021    HCT 38.2 05/14/2021     05/14/2021    MCV 98.2 05/14/2021    MCH 30.8 05/14/2021    MCHC 31.4 05/14/2021    RDW 13.2 05/14/2021    SEGSPCT 82.5 05/14/2021    LYMPHOPCT 13.9 05/14/2021    MONOPCT 3.1 05/14/2021    EOSPCT 3.5 05/25/2017    BASOPCT 0.1 05/14/2021    MONOSABS 0.2 05/14/2021    LYMPHSABS 1.0 05/14/2021    EOSABS 0.0 05/14/2021    BASOSABS 0.0 05/14/2021    DIFFTYPE AUTOMATED DIFFERENTIAL 05/14/2021     CMP:    Lab Results   Component Value Date     05/14/2021    K 4.8 05/14/2021    CL 99 05/14/2021    CO2 28 05/14/2021    BUN 21 05/14/2021    CREATININE 0.9 05/14/2021    GFRAA >60 05/14/2021    AGRATIO 1.1 07/31/2019    LABGLOM 59 05/14/2021    GLUCOSE 106 05/14/2021    PROT 6.7 05/14/2021    LABALBU 3.9 05/14/2021    CALCIUM 9.6 05/14/2021    BILITOT 0.2 05/14/2021    ALKPHOS 83 05/14/2021    AST 27 05/14/2021    ALT 13 05/14/2021     Echocardiogram complete 2D with doppler with color    Result Date: 5/14/2021  Transthoracic Echocardiography Report (TTE)  Demographics   Patient Name       Soniya Don  Date of Study       05/14/2021   Date of Birth      04/17/1933        Gender              Female   Age                80 year(s)        Race                   Patient Number     [id number]        Room Number         2125   Visit Number       [id number]   Corporate ID       [id number]   Accession Number   [id number]        Sonya 35,                                                           RDMS   Ordering Physician Edyta Vasquez MD Interpreting        Herring Sample                                       Physician           MD  Procedure Type of Study   TTE procedure:ECHOCARDIOGRAM COMPLETE 2D W DOPPLER W COLOR.   Procedure Date Date: 05/14/2021 Start: 08:42 AM Study Location: Portable Technical Quality: Technically difficult study Indications:Dyspnea/SOB. Patient Status: Routine Height: 58.66 inches Weight: 170 pounds BSA: 1.71 m2 BMI: 34.73 kg/m2 HR: 81 bpm BP: 125/54 mmHg  Conclusions   Summary  Left ventricular systolic function is normal.  Ejection fraction is visually estimated at 50%. Moderate left ventricular hypertrophy. Grade I diastolic dysfunction. Dilated ascending aorta measuring 4.1cm. Moderate aortic regurgitation; PHT: 463 msec. No evidence of any pericardial effusion. Signature   ------------------------------------------------------------------  Electronically signed by Alex Moy MD (Interpreting  physician) on 05/14/2021 at 05:57 PM  ------------------------------------------------------------------   Findings   Left Ventricle  Left ventricular systolic function is normal.  Ejection fraction is visually estimated at 50%. Moderate left ventricular hypertrophy. Grade I diastolic dysfunction. Left Atrium  Mildly dilated left atrium. Right Atrium  Right atrium is not clearly visualized. Right Ventricle  The right ventricle was not clearly visualized . Aortic Valve  Moderate aortic regurgitation; PHT: 463 msec. Mitral Valve  Structurally normal mitral valve. Tricuspid Valve  Trace tricuspid regurgitation; normal RVSP. Pulmonic Valve  The pulmonic valve was not well visualized. Pericardial Effusion  No evidence of any pericardial effusion. Pleural Effusion  No evidence of pleural effusion. Miscellaneous  Dilated ascending aorta measuring 4.1cm.   M-Mode/2D Measurements & Calculations   LV Diastolic Dimension:  LV Systolic Dimension:  LA Dimension: 3.8 cmAO Root  3.85 cm                  2.96 cm                 Dimension: 3 cmLA Area:  LV FS:23.1 %             LV Volume Diastolic: 79 80.3 cm2  LV PW Diastolic: 4.96 cm ml  LV PW Systolic: 4.28 cm  LV Volume Systolic: 43  Septum Diastolic: 7.81   ml  cm                       LV EDV/LV EDV Index: 79 RV Diastolic Dimension:  Septum Systolic: 3.42 cm WY/39 B7QF ESV/LV ESV   3.56 cm  CO: 2.41 l/min           Index: 43 ml/25 m2  CI: 1.41 l/m*m2          EF Calculated (A4C):    LA/Aorta: 1.27                           45.6 %  LV Area Diastolic: 77.3  EF Calculated (2D):     LA volume/Index: 68 ml  cm2                      46.9 %                  /77Y4  LV Area Systolic: 97.6  cm2                      LV Length: 6.82 cm                            LVOT: 1.8 cm  Doppler Measurements & Calculations   MV Peak E-Wave: 74.7    AV Peak Velocity: 106 cm/s   LVOT Peak Velocity:  cm/s                    AV Peak Gradient: 4.49 mmHg  69.5 cm/s  MV Peak A-Wave: 87 cm/s AV Mean Velocity: 79.1 cm/s  LVOT Mean Velocity:  MV E/A Ratio: 0.86      AV Mean Gradient: 3 mmHg     55.7 cm/s  MV Peak Gradient: 2.23  AV VTI: 20.3 cm              LVOT Peak Gradient: 3  mmHg                    AV Area (Continuity):1.47    mmHgLVOT Mean Gradient:                          cm2                          1 mmHg  MV P1/2t: 71 msec  MVA by PHT:3.1 cm2      LVOT VTI: 11.7 cm                          AV P1/2t: 463 msec      XR CHEST PORTABLE    Result Date: 5/13/2021  EXAMINATION: ONE XRAY VIEW OF THE CHEST 5/13/2021 6:17 pm COMPARISON: CT chest May 15, 2020; chest x-ray May 15, 2020 HISTORY: ORDERING SYSTEM PROVIDED HISTORY: Respiratory distress TECHNOLOGIST PROVIDED HISTORY: Reason for exam:->Respiratory distress Reason for Exam: Respiratory distress Acuity: Acute Type of Exam: Initial Additional signs and symptoms: none Relevant Medical/Surgical History: A-fib, GERD, bronchospasm FINDINGS: Consolidation of the right lung base which is highly suspicious for pneumonia. Recommend clinical follow-up to ensure resolution. Lungs otherwise appear grossly clear. Underlying chronic interstitial changes present. No pleural effusion or pneumothorax. Atherosclerotic changes of the aorta. Postoperative changes of median sternotomy.   No acute osseous abnormality evident. 1. Consolidation at the right lung base which is highly suggestive of pneumonia. Recommend interval follow-up to ensure resolution after appropriate clinical therapy. CTA PULMONARY W CONTRAST    Result Date: 5/13/2021  EXAMINATION: CTA OF THE CHEST 5/13/2021 8:33 pm TECHNIQUE: CTA of the chest was performed after the administration of intravenous contrast.  Multiplanar reformatted images are provided for review. MIP images are provided for review. Dose modulation, iterative reconstruction, and/or weight based adjustment of the mA/kV was utilized to reduce the radiation dose to as low as reasonably achievable. COMPARISON: Chest CT study from May 15, 2020. HISTORY: ORDERING SYSTEM PROVIDED HISTORY: Respiratory distress, elevated D-dimer, likely pneumonia, rule out PE TECHNOLOGIST PROVIDED HISTORY: Reason for exam:->Respiratory distress, elevated D-dimer, likely pneumonia, rule out PE Decision Support Exception - unselect if not a suspected or confirmed emergency medical condition->Emergency Medical Condition (MA) Reason for Exam: Respiratory distress, elevated D-dimer, likely pneumonia, rule out PE Acuity: Acute Type of Exam: Initial Additional signs and symptoms: no Relevant Medical/Surgical History: none FINDINGS: Pulmonary Arteries: Pulmonary arteries are adequately opacified for evaluation. No evidence of intraluminal filling defect to suggest pulmonary embolism. Main pulmonary artery is normal in caliber. Mediastinum: The heart is enlarged. Sternal fixation wires are present. There is aneurysmal enlargement of the ascending thoracic aorta, measuring 5.1 cm in AP dimension at the level of the main pulmonary artery. The proximal descending thoracic aorta at the same level is normal in caliber and measures 2.2 cm. .  No mediastinal hematoma. No pericardial effusion. No enlarged or suspicious axillary, hilar, or mediastinal lymphadenopathy. Lungs/pleura:  The trachea and mainstem bronchi are widely patent. There is diffuse ground-glass opacity throughout the lungs bilaterally to a moderate degree, somewhat asymmetric and more pronounced on the right. Small right pleural effusion. No pneumothorax or left pleural effusion. Soft Tissues/Bones: Degenerative changes are present throughout the thoracic spine. There is a new chronic compression deformity of the T10 vertebral body with a vertebral plana appearance. Upper Abdomen: The visualized upper abdomen is unremarkable. No evidence of pulmonary embolism. Slight interval increase in size of the known in sending aortic aneurysm, now measuring 5.1 cm (previously 4.8 cm). Cardiomegaly. Small right pleural effusion. New moderate diffuse ground-glass opacities throughout the lungs bilaterally, somewhat asymmetric and more pronounced on the right. This may be due to pulmonary edema although active COVID-19 viral pneumonia could have a similar appearance in the appropriate setting.      Discharge Exam:  Vitals:    05/16/21 0811   BP: (!) 153/66   Pulse: 78   Resp: 16   Temp: 97.9 °F (36.6 °C)   SpO2: 91%     General Appearance: alert and oriented to person, place and time, in no acute distress  Cardiovascular: normal rate, regular rhythm, normal S1 and S2  Pulmonary/Chest: Bilateral breath sounds   Abdomen: soft, non-tender, non-distended, normal bowel sounds, no masses   Extremities: Bilateral lower extremity pitting edema  Skin: warm and dry, no rash or erythema  Head: normocephalic and atraumatic  Eyes: pupils equal, round, and reactive to light  Neck: supple and non-tender without mass, no thyromegaly   Musculoskeletal: normal range of motion, no joint swelling, deformity or tenderness  Neurological: alert, oriented, normal speech, no focal findings or movement disorder noted    Disposition: home    Patient Instructions:     Discharge Medications:   Abdulaziz Rodrigues, [address] Medication Instructions YNJ:[id number]    Printed on: 05/16/21 4495   Medication Information                      acetaminophen (TYLENOL) 500 MG tablet  Take 500 mg by mouth every 6 hours as needed for Pain             albuterol (PROVENTIL) (5 MG/ML) 0.5% nebulizer solution  Take 1 mL by nebulization 4 times daily as needed for Wheezing             albuterol (PROVENTIL;VENTOLIN) 90 MCG/ACT inhaler  Inhale 2 puffs into the lungs 4 times daily as needed              apixaban (ELIQUIS) 5 MG TABS tablet  TAKE 1 TABLET BY MOUTH TWICE A DAY             aspirin 81 MG tablet  Take 81 mg by mouth daily             atorvastatin (LIPITOR) 10 MG tablet  TAKE 1 TABLET BY MOUTH EVERY DAY             carvedilol (COREG) 12.5 MG tablet  TAKE 1 TABLET BY MOUTH TWICE A DAY             cefdinir (OMNICEF) 300 MG capsule  Take 1 capsule by mouth 2 times daily for 3 days             [employer] ESOMEPRAZOLE MAGNESIUM PO  Take 20 mg by mouth daily             fluticasone-salmeterol (WIXELA INHUB) 250-50 MCG/DOSE AEPB  INHALE 1 PUFF INTO THE LUNGS EVERY 12 HOURS             furosemide (LASIX) 20 MG tablet  Take 1 tablet by mouth daily             levothyroxine (SYNTHROID) 25 MCG tablet  TAKE 1 TABLET BY MOUTH EVERY DAY             multivitamin (THERAGRAN) per tablet  Take 1 tablet by mouth daily. nitroGLYCERIN (NITROSTAT) 0.4 MG SL tablet  Place 1 tablet under the tongue every 5 minutes as needed for Chest pain             predniSONE (DELTASONE) 10 MG tablet  5 tablets by mouth once a day x 2 days, then 4 tablets by mouth once a day x 2 days, then 3 tablets by mouth once a day x 2 days, then 2 tablets by mouth once a day x 2 days, then 1 tablets by mouth once a day x 2 days then stop.                  Activity: activity as tolerated    Diet: cardiac diet    Wound Care: none needed    Follow-up:  PCP 1-2 weeks  Cardiology 1 week    Time Spent Doing discharge 33 min  Electronically signed by Gera Pineda MD  on 5/16/21 at 12:55 PM EDT

## 2021-05-16 NOTE — PLAN OF CARE
Problem: Falls - Risk of:  Goal: Will remain free from falls  Description: Will remain free from falls  5/16/2021 1349 by Fredy Walls RN  Outcome: Completed  5/16/2021 1001 by Fredy Walls RN  Outcome: Ongoing  Goal: Absence of physical injury  Description: Absence of physical injury  5/16/2021 1349 by Fredy Walls RN  Outcome: Completed  5/16/2021 1001 by Fredy Walls RN  Outcome: Ongoing     Problem: Pain:  Goal: Pain level will decrease  Description: Pain level will decrease  5/16/2021 1349 by Fredy Walls RN  Outcome: Completed  5/16/2021 1001 by Fredy Walls RN  Outcome: Ongoing  Goal: Control of acute pain  Description: Control of acute pain  5/16/2021 1349 by Fredy Walls RN  Outcome: Completed  5/16/2021 1001 by Fredy Walls RN  Outcome: Ongoing  Goal: Control of chronic pain  Description: Control of chronic pain  5/16/2021 1349 by Fredy Walls RN  Outcome: Completed  5/16/2021 1001 by Fredy Walls RN  Outcome: Ongoing     Problem: Skin Integrity:  Goal: Will show no infection signs and symptoms  Description: Will show no infection signs and symptoms  5/16/2021 1349 by Fredy Walls RN  Outcome: Completed  5/16/2021 1001 by Fredy Walls RN  Outcome: Ongoing  Goal: Absence of new skin breakdown  Description: Absence of new skin breakdown  5/16/2021 1349 by Fredy Walls RN  Outcome: Completed  5/16/2021 1001 by Fredy Walls RN  Outcome: Ongoing

## 2021-05-17 ENCOUNTER — CARE COORDINATION (OUTPATIENT)
Dept: CASE MANAGEMENT | Age: 86
End: 2021-05-17

## 2021-05-17 DIAGNOSIS — J18.9 PNEUMONIA OF BOTH LUNGS DUE TO INFECTIOUS ORGANISM, UNSPECIFIED PART OF LUNG: Primary | ICD-10-CM

## 2021-05-17 PROCEDURE — 1111F DSCHRG MED/CURRENT MED MERGE: CPT | Performed by: FAMILY MEDICINE

## 2021-05-17 NOTE — PROGRESS NOTES
Physician Progress Note      PATIENT:               Yuliana Wright  [facility] #:                  [id number]  :                       1933  ADMIT DATE:       2021 5:20 PM  [address] [place] DATE:        2021 2:04 PM  RESPONDING  PROVIDER #:        Jessica Shaw MD          QUERY TEXT:    Dear Hospitalist,    Patient admitted with respiratory distress. Noted documentation of sepsis in   DC summary. In order to support the diagnosis of sepsis, please include   additional clinical indicators in your documentation. Or please document if   the diagnosis of sepsis has been ruled out after further study    The medical record reflects the following:  Risk Factors: Possible PNA, age,  Clinical Indicators: WBC 9.6 - 7, lactic acid 5.7 - 0.9, T 98.8 - 97.6, P 113   -  68, R 34 -16,   - 103, blood Cx (x2)showed no growth at 72 hours. ,    Pulmonary CTA on  \"New moderate diffuse ground-glass opacities throughout   the lungs bilaterally, somewhat asymmetric and more pronounced on the right. .... Sriram Cumber Sriram Cumber may be due to pulmonary edema although active COVID-19 viral pneumonia   could have a similar appearance\"  Treatment: Serial CBC, serial lactic acid. CTA pulmonary, CXR, blood Cx, IV   Zithromax one time, IV Rocephin,    Thank you,  LENY MartinN, RN, CDS  [phone]  Options provided:  -- Sepsis present as evidenced by, Please document evidence. -- Sepsis was ruled out after study  -- Other - I will add my own diagnosis  -- Disagree - Not applicable / Not valid  -- Disagree - Clinically unable to determine / Unknown  -- Refer to Clinical Documentation Reviewer    PROVIDER RESPONSE TEXT:    Sepsis was ruled out after study.     Query created by: Manjula Osei on 2021 8:15 AM      Electronically signed by:  Jessica Shaw MD 2021 12:57 PM

## 2021-05-17 NOTE — CARE COORDINATION
addressed with provider: No         Method of communication with provider : N/A    Was this a readmission? No  Patient stated reason for admission: Shortness of breath  Patients top risk factors for readmission: medical condition-Copd, CHF, Pna, A Fib, caregiver stress and Transitioning to Palliative Care     Spoke w/ Stephany Teague Dtr/SAAD for [facility] discharge call. Dtr reports Patient doing well, offers no Patient c/o or sx. Reviewed CHF Zone Mgt:    Daily weights in a.m. before breakfast, after voiding   Keep written log of weights for review  Gerry Krishnamurthy MD immediately of weight gain/loss 3# or more in 1-7days   Take all rx as prescribed, keep scheduled MD appts   Low sodium diet- read labels; avoid/limit high sodium foods such as fast food, canned/boxed/processed foods, frozen meals, soups, cheeses, breads, chips, soda.  Do not add salt to food  Adhere to MD recommended fluid restriction    Reviewed COPD Zone Mgt:   Take meds as directed  Use Med Nebulizer or rescue inhalers as prescribed. Make sure equipment is in good working order and have all supplies. Call MD immediately if noting sob, thicker or change in mucus color, increased cough or wheezing, fever, chills as you may need corticosteroid to antibiotic. Call 911 if noting acute distress.  Avoid cigarette smoke, inhaled irritants and other known triggers   Notify MD immediately if experiencing increased sob, orthopnea, edema, weight gain, feeling of uneasiness, chest pain, increased cough, confusion, wheezing or chest tightness. Call 911 if noting acute distress    PNA Zone Mgt:     Take meds as directed, do not skip any prescribed antibiotics or corticosteroids   Rest, pace activities   Drink plenty of fluids (if not contraindicated), proper nutrition   Manage fever with OTC   Notify MD immediately if noting sob, heart fluttering, thicker or discolored phlegm, increased fatigue/weakness, fever, chills, sweating, dry cough, headache.  Call

## 2021-05-18 ENCOUNTER — TELEPHONE (OUTPATIENT)
Dept: FAMILY MEDICINE CLINIC | Age: 86
End: 2021-05-18

## 2021-05-18 LAB
CULTURE: NORMAL
CULTURE: NORMAL
Lab: NORMAL
Lab: NORMAL
SPECIMEN: NORMAL
SPECIMEN: NORMAL

## 2021-05-24 ENCOUNTER — CARE COORDINATION (OUTPATIENT)
Dept: CASE MANAGEMENT | Age: 86
End: 2021-05-24

## 2021-06-16 ENCOUNTER — HOSPITAL ENCOUNTER (INPATIENT)
Age: 86
LOS: 4 days | Discharge: HOME | DRG: 280 | End: 2021-06-20
Attending: EMERGENCY MEDICINE | Admitting: INTERNAL MEDICINE
Payer: MEDICARE

## 2021-06-16 ENCOUNTER — APPOINTMENT (OUTPATIENT)
Dept: GENERAL RADIOLOGY | Age: 86
DRG: 280 | End: 2021-06-16
Payer: MEDICARE

## 2021-06-16 DIAGNOSIS — I25.810 CORONARY ARTERY DISEASE INVOLVING CORONARY BYPASS GRAFT OF NATIVE HEART WITHOUT ANGINA PECTORIS: ICD-10-CM

## 2021-06-16 DIAGNOSIS — J96.01 ACUTE HYPOXEMIC RESPIRATORY FAILURE (HCC): ICD-10-CM

## 2021-06-16 DIAGNOSIS — J44.1 COPD EXACERBATION (HCC): ICD-10-CM

## 2021-06-16 DIAGNOSIS — I10 ESSENTIAL HYPERTENSION: ICD-10-CM

## 2021-06-16 DIAGNOSIS — J18.9 PNEUMONIA OF BOTH LUNGS DUE TO INFECTIOUS ORGANISM, UNSPECIFIED PART OF LUNG: Primary | ICD-10-CM

## 2021-06-16 DIAGNOSIS — R77.8 ELEVATED TROPONIN: ICD-10-CM

## 2021-06-16 PROBLEM — I50.33 DIASTOLIC CHF, ACUTE ON CHRONIC (HCC): Status: ACTIVE | Noted: 2021-06-16

## 2021-06-16 LAB
ALBUMIN SERPL-MCNC: 3.7 GM/DL (ref 3.4–5)
ALP BLD-CCNC: 106 IU/L (ref 40–129)
ALT SERPL-CCNC: 13 U/L (ref 10–40)
ANION GAP SERPL CALCULATED.3IONS-SCNC: 11 MMOL/L (ref 4–16)
AST SERPL-CCNC: 18 IU/L (ref 15–37)
BASOPHILS ABSOLUTE: 0 K/CU MM
BASOPHILS RELATIVE PERCENT: 0.5 % (ref 0–1)
BILIRUB SERPL-MCNC: 0.2 MG/DL (ref 0–1)
BUN BLDV-MCNC: 17 MG/DL (ref 6–23)
CALCIUM SERPL-MCNC: 9.6 MG/DL (ref 8.3–10.6)
CHLORIDE BLD-SCNC: 93 MMOL/L (ref 99–110)
CO2: 28 MMOL/L (ref 21–32)
CREAT SERPL-MCNC: 0.9 MG/DL (ref 0.6–1.1)
DIFFERENTIAL TYPE: ABNORMAL
EOSINOPHILS ABSOLUTE: 0.1 K/CU MM
EOSINOPHILS RELATIVE PERCENT: 1.2 % (ref 0–3)
GFR AFRICAN AMERICAN: >60 ML/MIN/1.73M2
GFR NON-AFRICAN AMERICAN: 59 ML/MIN/1.73M2
GLUCOSE BLD-MCNC: 224 MG/DL (ref 70–99)
HCT VFR BLD CALC: 40.1 % (ref 37–47)
HEMOGLOBIN: 12.1 GM/DL (ref 12.5–16)
IMMATURE NEUTROPHIL %: 2 % (ref 0–0.43)
LACTIC ACID, SEPSIS: 2.8 MMOL/L (ref 0.5–1.9)
LYMPHOCYTES ABSOLUTE: 4.4 K/CU MM
LYMPHOCYTES RELATIVE PERCENT: 52.6 % (ref 24–44)
MCH RBC QN AUTO: 30.5 PG (ref 27–31)
MCHC RBC AUTO-ENTMCNC: 30.2 % (ref 32–36)
MCV RBC AUTO: 101 FL (ref 78–100)
MONOCYTES ABSOLUTE: 0.6 K/CU MM
MONOCYTES RELATIVE PERCENT: 7.6 % (ref 0–4)
NUCLEATED RBC %: 0 %
PDW BLD-RTO: 13 % (ref 11.7–14.9)
PLATELET # BLD: 332 K/CU MM (ref 140–440)
PMV BLD AUTO: 9.7 FL (ref 7.5–11.1)
POTASSIUM SERPL-SCNC: 4.6 MMOL/L (ref 3.5–5.1)
PRO-BNP: 6760 PG/ML
PROCALCITONIN: 0.06
RBC # BLD: 3.97 M/CU MM (ref 4.2–5.4)
SARS-COV-2, NAAT: NOT DETECTED
SEGMENTED NEUTROPHILS ABSOLUTE COUNT: 3 K/CU MM
SEGMENTED NEUTROPHILS RELATIVE PERCENT: 36.1 % (ref 36–66)
SODIUM BLD-SCNC: 132 MMOL/L (ref 135–145)
SOURCE: NORMAL
TOTAL IMMATURE NEUTOROPHIL: 0.17 K/CU MM
TOTAL NUCLEATED RBC: 0 K/CU MM
TOTAL PROTEIN: 7 GM/DL (ref 6.4–8.2)
TROPONIN T: 0.01 NG/ML
WBC # BLD: 8.3 K/CU MM (ref 4–10.5)

## 2021-06-16 PROCEDURE — 82805 BLOOD GASES W/O2 SATURATION: CPT

## 2021-06-16 PROCEDURE — 85025 COMPLETE CBC W/AUTO DIFF WBC: CPT

## 2021-06-16 PROCEDURE — 96366 THER/PROPH/DIAG IV INF ADDON: CPT

## 2021-06-16 PROCEDURE — 87635 SARS-COV-2 COVID-19 AMP PRB: CPT

## 2021-06-16 PROCEDURE — 83605 ASSAY OF LACTIC ACID: CPT

## 2021-06-16 PROCEDURE — 36415 COLL VENOUS BLD VENIPUNCTURE: CPT

## 2021-06-16 PROCEDURE — 2580000003 HC RX 258: Performed by: EMERGENCY MEDICINE

## 2021-06-16 PROCEDURE — 6360000002 HC RX W HCPCS: Performed by: EMERGENCY MEDICINE

## 2021-06-16 PROCEDURE — 96375 TX/PRO/DX INJ NEW DRUG ADDON: CPT

## 2021-06-16 PROCEDURE — 2060000000 HC ICU INTERMEDIATE R&B

## 2021-06-16 PROCEDURE — 94660 CPAP INITIATION&MGMT: CPT

## 2021-06-16 PROCEDURE — 84145 PROCALCITONIN (PCT): CPT

## 2021-06-16 PROCEDURE — 99284 EMERGENCY DEPT VISIT MOD MDM: CPT

## 2021-06-16 PROCEDURE — 6360000002 HC RX W HCPCS: Performed by: INTERNAL MEDICINE

## 2021-06-16 PROCEDURE — 96367 TX/PROPH/DG ADDL SEQ IV INF: CPT

## 2021-06-16 PROCEDURE — 84484 ASSAY OF TROPONIN QUANT: CPT

## 2021-06-16 PROCEDURE — 6370000000 HC RX 637 (ALT 250 FOR IP): Performed by: INTERNAL MEDICINE

## 2021-06-16 PROCEDURE — 83880 ASSAY OF NATRIURETIC PEPTIDE: CPT

## 2021-06-16 PROCEDURE — 80053 COMPREHEN METABOLIC PANEL: CPT

## 2021-06-16 PROCEDURE — 94640 AIRWAY INHALATION TREATMENT: CPT

## 2021-06-16 PROCEDURE — 71045 X-RAY EXAM CHEST 1 VIEW: CPT

## 2021-06-16 PROCEDURE — 93005 ELECTROCARDIOGRAM TRACING: CPT | Performed by: EMERGENCY MEDICINE

## 2021-06-16 PROCEDURE — 96365 THER/PROPH/DIAG IV INF INIT: CPT

## 2021-06-16 RX ORDER — ONDANSETRON 2 MG/ML
4 INJECTION INTRAMUSCULAR; INTRAVENOUS EVERY 6 HOURS PRN
Status: DISCONTINUED | OUTPATIENT
Start: 2021-06-16 | End: 2021-06-20 | Stop reason: HOSPADM

## 2021-06-16 RX ORDER — FUROSEMIDE 10 MG/ML
80 INJECTION INTRAMUSCULAR; INTRAVENOUS ONCE
Status: COMPLETED | OUTPATIENT
Start: 2021-06-16 | End: 2021-06-16

## 2021-06-16 RX ORDER — METHYLPREDNISOLONE SODIUM SUCCINATE 125 MG/2ML
60 INJECTION, POWDER, LYOPHILIZED, FOR SOLUTION INTRAMUSCULAR; INTRAVENOUS ONCE
Status: COMPLETED | OUTPATIENT
Start: 2021-06-16 | End: 2021-06-16

## 2021-06-16 RX ORDER — ONDANSETRON 2 MG/ML
4 INJECTION INTRAMUSCULAR; INTRAVENOUS EVERY 6 HOURS PRN
Status: DISCONTINUED | OUTPATIENT
Start: 2021-06-16 | End: 2021-06-16 | Stop reason: SDUPTHER

## 2021-06-16 RX ORDER — LEVOFLOXACIN 5 MG/ML
750 INJECTION, SOLUTION INTRAVENOUS ONCE
Status: COMPLETED | OUTPATIENT
Start: 2021-06-16 | End: 2021-06-16

## 2021-06-16 RX ORDER — IPRATROPIUM BROMIDE AND ALBUTEROL SULFATE 2.5; .5 MG/3ML; MG/3ML
1 SOLUTION RESPIRATORY (INHALATION)
Status: DISCONTINUED | OUTPATIENT
Start: 2021-06-16 | End: 2021-06-20 | Stop reason: HOSPADM

## 2021-06-16 RX ADMIN — ONDANSETRON 4 MG: 2 INJECTION INTRAMUSCULAR; INTRAVENOUS at 22:36

## 2021-06-16 RX ADMIN — LEVOFLOXACIN 750 MG: 5 INJECTION, SOLUTION INTRAVENOUS at 19:46

## 2021-06-16 RX ADMIN — FUROSEMIDE 80 MG: 10 INJECTION, SOLUTION INTRAVENOUS at 19:35

## 2021-06-16 RX ADMIN — IPRATROPIUM BROMIDE AND ALBUTEROL SULFATE 1 AMPULE: .5; 3 SOLUTION RESPIRATORY (INHALATION) at 22:40

## 2021-06-16 RX ADMIN — METHYLPREDNISOLONE SODIUM SUCCINATE 60 MG: 125 INJECTION, POWDER, FOR SOLUTION INTRAMUSCULAR; INTRAVENOUS at 22:36

## 2021-06-16 NOTE — ED TRIAGE NOTES
Pt presents to ED from home per EMS for respiratory distress. When EMS arrived pt was on 2L NC with O2 sat at 79%. Pt arrived on 4L with albuterul breathing tx applied. Pt has labored RR and is 89% O2 saturation. Rowdy Aceves is at bedside.  Respiratory is at bedside

## 2021-06-16 NOTE — ED PROVIDER NOTES
I assigned myself in error. I did not see this patient or participate in her care. Dr. Armida Collado was already caring for this patient at the time I assigned myself to her.     Electronically signed by Bertin Motley MD on 6/16/2021 at 7:14 PM        Bertin Motley MD  06/16/21 1914

## 2021-06-17 LAB
BACTERIA: NEGATIVE /HPF
BASE EXCESS MIXED: 2.8 (ref 0–2.3)
BILIRUBIN URINE: NEGATIVE MG/DL
BLOOD, URINE: NEGATIVE
CLARITY: CLEAR
COLOR: ABNORMAL
COMMENT: ABNORMAL
GLUCOSE, URINE: NEGATIVE MG/DL
HCO3 VENOUS: 31 MMOL/L (ref 19–25)
KETONES, URINE: NEGATIVE MG/DL
LACTIC ACID, SEPSIS: 1.3 MMOL/L (ref 0.5–1.9)
LEUKOCYTE ESTERASE, URINE: NEGATIVE
NITRITE URINE, QUANTITATIVE: NEGATIVE
O2 SAT, VEN: 92.1 % (ref 50–70)
PCO2, VEN: 63 MMHG (ref 38–52)
PH VENOUS: 7.3 (ref 7.32–7.42)
PH, URINE: 6 (ref 5–8)
PO2, VEN: 83 MMHG (ref 28–48)
PROTEIN UA: NEGATIVE MG/DL
RBC URINE: <1 /HPF (ref 0–6)
SPECIFIC GRAVITY UA: 1.01 (ref 1–1.03)
SQUAMOUS EPITHELIAL: 1 /HPF
TRANSITIONAL EPITHELIAL: <1 /HPF
TRICHOMONAS: ABNORMAL /HPF
UROBILINOGEN, URINE: NEGATIVE MG/DL (ref 0.2–1)
WBC UA: <1 /HPF (ref 0–5)

## 2021-06-17 PROCEDURE — 94640 AIRWAY INHALATION TREATMENT: CPT

## 2021-06-17 PROCEDURE — 2580000003 HC RX 258: Performed by: INTERNAL MEDICINE

## 2021-06-17 PROCEDURE — 6360000002 HC RX W HCPCS: Performed by: INTERNAL MEDICINE

## 2021-06-17 PROCEDURE — 6370000000 HC RX 637 (ALT 250 FOR IP): Performed by: INTERNAL MEDICINE

## 2021-06-17 PROCEDURE — 2700000000 HC OXYGEN THERAPY PER DAY

## 2021-06-17 PROCEDURE — 99222 1ST HOSP IP/OBS MODERATE 55: CPT | Performed by: INTERNAL MEDICINE

## 2021-06-17 PROCEDURE — 83605 ASSAY OF LACTIC ACID: CPT

## 2021-06-17 PROCEDURE — 94660 CPAP INITIATION&MGMT: CPT

## 2021-06-17 PROCEDURE — 2060000000 HC ICU INTERMEDIATE R&B

## 2021-06-17 PROCEDURE — 94761 N-INVAS EAR/PLS OXIMETRY MLT: CPT

## 2021-06-17 PROCEDURE — 81001 URINALYSIS AUTO W/SCOPE: CPT

## 2021-06-17 RX ORDER — ACETAMINOPHEN 325 MG/1
650 TABLET ORAL EVERY 6 HOURS PRN
Status: DISCONTINUED | OUTPATIENT
Start: 2021-06-17 | End: 2021-06-20 | Stop reason: HOSPADM

## 2021-06-17 RX ORDER — METHYLPREDNISOLONE SODIUM SUCCINATE 40 MG/ML
40 INJECTION, POWDER, LYOPHILIZED, FOR SOLUTION INTRAMUSCULAR; INTRAVENOUS EVERY 8 HOURS
Status: DISCONTINUED | OUTPATIENT
Start: 2021-06-17 | End: 2021-06-20 | Stop reason: HOSPADM

## 2021-06-17 RX ORDER — ASPIRIN 81 MG/1
81 TABLET, CHEWABLE ORAL DAILY
Status: DISCONTINUED | OUTPATIENT
Start: 2021-06-17 | End: 2021-06-20 | Stop reason: HOSPADM

## 2021-06-17 RX ORDER — PANTOPRAZOLE SODIUM 40 MG/1
40 TABLET, DELAYED RELEASE ORAL
Status: DISCONTINUED | OUTPATIENT
Start: 2021-06-17 | End: 2021-06-20 | Stop reason: HOSPADM

## 2021-06-17 RX ORDER — BUDESONIDE AND FORMOTEROL FUMARATE DIHYDRATE 160; 4.5 UG/1; UG/1
2 AEROSOL RESPIRATORY (INHALATION) 2 TIMES DAILY
Status: DISCONTINUED | OUTPATIENT
Start: 2021-06-17 | End: 2021-06-20 | Stop reason: HOSPADM

## 2021-06-17 RX ORDER — SODIUM CHLORIDE 9 MG/ML
25 INJECTION, SOLUTION INTRAVENOUS PRN
Status: DISCONTINUED | OUTPATIENT
Start: 2021-06-17 | End: 2021-06-20 | Stop reason: HOSPADM

## 2021-06-17 RX ORDER — LEVOTHYROXINE SODIUM 0.03 MG/1
25 TABLET ORAL DAILY
Status: DISCONTINUED | OUTPATIENT
Start: 2021-06-17 | End: 2021-06-20 | Stop reason: HOSPADM

## 2021-06-17 RX ORDER — ACETAMINOPHEN 650 MG/1
650 SUPPOSITORY RECTAL EVERY 6 HOURS PRN
Status: DISCONTINUED | OUTPATIENT
Start: 2021-06-17 | End: 2021-06-20 | Stop reason: HOSPADM

## 2021-06-17 RX ORDER — FUROSEMIDE 10 MG/ML
40 INJECTION INTRAMUSCULAR; INTRAVENOUS 2 TIMES DAILY
Status: DISCONTINUED | OUTPATIENT
Start: 2021-06-17 | End: 2021-06-20 | Stop reason: HOSPADM

## 2021-06-17 RX ORDER — SODIUM CHLORIDE 0.9 % (FLUSH) 0.9 %
5-40 SYRINGE (ML) INJECTION PRN
Status: DISCONTINUED | OUTPATIENT
Start: 2021-06-17 | End: 2021-06-20 | Stop reason: HOSPADM

## 2021-06-17 RX ORDER — ATORVASTATIN CALCIUM 10 MG/1
10 TABLET, FILM COATED ORAL DAILY
Status: DISCONTINUED | OUTPATIENT
Start: 2021-06-17 | End: 2021-06-20 | Stop reason: HOSPADM

## 2021-06-17 RX ORDER — CARVEDILOL 12.5 MG/1
12.5 TABLET ORAL 2 TIMES DAILY WITH MEALS
Status: DISCONTINUED | OUTPATIENT
Start: 2021-06-17 | End: 2021-06-19

## 2021-06-17 RX ORDER — POLYETHYLENE GLYCOL 3350 17 G/17G
17 POWDER, FOR SOLUTION ORAL DAILY PRN
Status: DISCONTINUED | OUTPATIENT
Start: 2021-06-17 | End: 2021-06-20 | Stop reason: HOSPADM

## 2021-06-17 RX ORDER — SODIUM CHLORIDE 0.9 % (FLUSH) 0.9 %
5-40 SYRINGE (ML) INJECTION EVERY 12 HOURS SCHEDULED
Status: DISCONTINUED | OUTPATIENT
Start: 2021-06-17 | End: 2021-06-20 | Stop reason: HOSPADM

## 2021-06-17 RX ADMIN — METHYLPREDNISOLONE SODIUM SUCCINATE 40 MG: 40 INJECTION, POWDER, FOR SOLUTION INTRAMUSCULAR; INTRAVENOUS at 16:10

## 2021-06-17 RX ADMIN — IPRATROPIUM BROMIDE AND ALBUTEROL SULFATE 1 AMPULE: .5; 3 SOLUTION RESPIRATORY (INHALATION) at 12:35

## 2021-06-17 RX ADMIN — PANTOPRAZOLE SODIUM 40 MG: 40 TABLET, DELAYED RELEASE ORAL at 10:06

## 2021-06-17 RX ADMIN — IPRATROPIUM BROMIDE AND ALBUTEROL SULFATE 1 AMPULE: .5; 3 SOLUTION RESPIRATORY (INHALATION) at 09:09

## 2021-06-17 RX ADMIN — FUROSEMIDE 40 MG: 10 INJECTION, SOLUTION INTRAVENOUS at 10:06

## 2021-06-17 RX ADMIN — LEVOTHYROXINE SODIUM 25 MCG: 25 TABLET ORAL at 10:07

## 2021-06-17 RX ADMIN — APIXABAN 5 MG: 5 TABLET, FILM COATED ORAL at 10:06

## 2021-06-17 RX ADMIN — SODIUM CHLORIDE, PRESERVATIVE FREE 10 ML: 5 INJECTION INTRAVENOUS at 19:49

## 2021-06-17 RX ADMIN — BUDESONIDE AND FORMOTEROL FUMARATE DIHYDRATE 2 PUFF: 160; 4.5 AEROSOL RESPIRATORY (INHALATION) at 09:19

## 2021-06-17 RX ADMIN — METHYLPREDNISOLONE SODIUM SUCCINATE 40 MG: 40 INJECTION, POWDER, FOR SOLUTION INTRAMUSCULAR; INTRAVENOUS at 06:57

## 2021-06-17 RX ADMIN — IPRATROPIUM BROMIDE AND ALBUTEROL SULFATE 1 AMPULE: .5; 3 SOLUTION RESPIRATORY (INHALATION) at 16:23

## 2021-06-17 RX ADMIN — SODIUM CHLORIDE, PRESERVATIVE FREE 10 ML: 5 INJECTION INTRAVENOUS at 10:07

## 2021-06-17 RX ADMIN — IPRATROPIUM BROMIDE AND ALBUTEROL SULFATE 1 AMPULE: .5; 3 SOLUTION RESPIRATORY (INHALATION) at 20:29

## 2021-06-17 RX ADMIN — FUROSEMIDE 40 MG: 10 INJECTION, SOLUTION INTRAVENOUS at 16:10

## 2021-06-17 RX ADMIN — ATORVASTATIN CALCIUM 10 MG: 10 TABLET, FILM COATED ORAL at 10:07

## 2021-06-17 RX ADMIN — CARVEDILOL 12.5 MG: 12.5 TABLET, FILM COATED ORAL at 10:07

## 2021-06-17 RX ADMIN — CARVEDILOL 12.5 MG: 12.5 TABLET, FILM COATED ORAL at 16:11

## 2021-06-17 RX ADMIN — APIXABAN 5 MG: 5 TABLET, FILM COATED ORAL at 19:48

## 2021-06-17 RX ADMIN — METHYLPREDNISOLONE SODIUM SUCCINATE 40 MG: 40 INJECTION, POWDER, FOR SOLUTION INTRAMUSCULAR; INTRAVENOUS at 21:55

## 2021-06-17 RX ADMIN — BUDESONIDE AND FORMOTEROL FUMARATE DIHYDRATE 2 PUFF: 160; 4.5 AEROSOL RESPIRATORY (INHALATION) at 20:30

## 2021-06-17 RX ADMIN — ASPIRIN 81 MG CHEWABLE TABLET 81 MG: 81 TABLET CHEWABLE at 10:06

## 2021-06-17 ASSESSMENT — PAIN SCALES - GENERAL
PAINLEVEL_OUTOF10: 0

## 2021-06-17 NOTE — PROGRESS NOTES
RT protocol calls for BID inhalers, however patient does the treatments at home and wishes to do them while here.

## 2021-06-17 NOTE — H&P
HISTORY AND PHYSICAL  (Hospitalist, Internal Medicine)  IDENTIFYING INFORMATION   PATIENT:  Nikhil Rodriguez  MRN:  [id number]  ADMIT DATE: 6/16/2021      CHIEF COMPLAINT   Shortness of breath    HISTORY OF PRESENT ILLNESS   Nikhil Rodriguez is a 80 y.o. female with coronary artery disease s/p CABG (7/15), chronic diastolic congestive heart failure, paroxysmal atrial fibrillation, on anticoagulation, CVA 4 years ago post cardiac cath (2016), ascending aortic aneurysm, chronic respiratory failure on home oxygen at 2 L/min hypertension, hyperlipidemia,, GERD, hypothyroidism, moderate obesity, recent admission to the hospital 5/13-5/16/21 for acute hypoxic and hypercapnic respiratory failure, currently under hospice as per patient's daughter at bedside presented to ED with complaints of shortness of breath since yesterday. Patient feeling short of breath, very tachypneic. Most of the information was obtained from patient's daughter at bedside. She denied patient having any fever, chills, but has chronic cough. Patient denied any abdominal pain, denied any urinary complaints, denied any constipation or diarrhea. Daughter reported compliance to medications. Patient has 24/7 care givers besides her daughter. As per EMS patient was saturating 60% on room air. Patient was placed on nonrebreather and transferred to ER. Patient was placed on BiPAP in ER. Vitals in ED-/94, , RR 29, temp 98.8, saturating 90% on BiPAP. Lab work significant for sodium 132,Lactic acid 2.8, glucose 228, WBC 8.3, hemoglobin 12.1, platelets 458. Rapid Covid negative. UA-not suggestive of infection. Chest x-ray-bilateral pulmonary opacities which are significantly worse than on the prior study. Consolidative density in the right lung base-this could represent edema or infiltrates.   Pulmonary masses are difficult to entirely exclude. Patient received Lasix 80 mg in ED, levofloxacin, Zosyn. On my evaluation patient was nauseated. Taken off of BiPAP. Was in respiratory distress, having abdominal breathing. Requiring 6 L of oxygen to maintain 94% oxygen saturation. Wheezing noted on examination. Stat VBG ordered. Zofran, IV methylprednisolone, DuoNeb ordered. On reevaluation nausea resolved, patient was resting but still appeared to be tachypneic, respiratory distress. Discussed with RT and place the patient back on BiPAP. PAST MEDICAL HISTORY PAST SURGICAL HISTORY   coronary artery disease s/p CABG (7/15), chronic diastolic congestive heart failure, paroxysmal atrial fibrillation, on anticoagulation, CVA 4 years ago post cardiac cath (2016), ascending aortic aneurysm, chronic respiratory failure on home oxygen at 2 L/min hypertension, hyperlipidemia,, GERD, hypothyroidism, moderate obesity, recent admission to the hospital 5/13-5/16/21 for acute hypoxic and hypercapnic respiratory failure, currently under hospice Cholecystectomy, CABG   FAMILY HISTORY SOCIAL HISTORY    Reviewed and noncontributory  No smoking, alcohol, illicit drug abuse   MEDICATIONS ALLERGIES    As per daughter-no changes to medications since recent discharge. Carvedilol 12.5 mg twice daily, Lasix 20 mg daily, levothyroxine 20 MCG daily, Wixela Inhub, apixaban 5 mg twice daily, atorvastatin 10 mg daily, aspirin 81 mg daily, multivitamins. Sulfa antibiotics, Zoloft       PAST MEDICAL, SURGICAL, FAMILY, and SOCIAL HISTORY         Past Medical History:   Diagnosis Date    A[facility]fib ([facility] [address]     Bronchospasm     CHF (congestive heart failure) (HCC)     COPD (chronic obstructive pulmonary disease) (HCC)     Essential hypertension     Family history of coronary artery disease     GERD (gastroesophageal reflux disease)     H/O cardiovascular stress test 08/13/2013    EF 70%, no ischemia, normal perfusion pattern in all regions, normal study.     H/O echocardiogram 06/05/2019    EF 58-77%, Grade I diastolic dysfunction, Mild septal wall asymmetrical left ventricular hypertophy, sclerotic but non stenotic aortic vavle, mitral annular calcification, Mild AR,     Headache     History of PTCA 1995,10/7/05,10/25/05    LAD,RCAX2 Ramus    Hx of cardiovascular stress test     11/12/2009=normal perfusion EF 70%, pt c/o 6/10 chest pain during adenosine infusion;  03/2008=EF 65%; 02/2006=EF 70%; 05/2005; 08/2001;10/1997; 09/1996    Hx of echocardiogram 8/13/13,11/12/09,05/2006,08/2001,10/1997    8/13-EF 50-55% essentially normal study. 11/09=EF>55%, mild MR    Hyperlipidemia     Obesity     Paroxysmal atrial fibrillation ([facility] [address] 1/13/2016    Postmenopausal state     Restrictive lung disease     VHD (valvular heart disease)      Past Surgical History:   Procedure Laterality Date    CATARACT REMOVAL WITH IMPLANT Bilateral 2006    CHOLECYSTECTOMY      CORONARY ANGIOPLASTY  2005, 1995    10/25/2005=ramus 2.5x15mm taxus and 2.5x8mm ostium and proximal portion of the ramus; PTCA LAD in [address]  07/2016    x 2    THORACENTESIS Left 08/15/2016    525 ml s/s     Family History   Problem Relation Age of Onset    Heart Failure Mother     Heart Disease Father     Heart Disease Sister     Heart Disease Sister     Heart Surgery Sister     Rheum Arthritis Sister     Colon Cancer Other      Family Hx of HTN  Family Hx as reviewed above, otherwise non-contributory  Social History     Socioeconomic History    Marital status:       Spouse name: Not on file    Number of children: 11    Years of education: Not on file    Highest education level: Not on file   Occupational History    Not on file   Tobacco Use    Smoking status: Never Smoker    Smokeless tobacco: Never Used   Vaping Use    Vaping Use: Never used   Substance and Sexual Activity    Alcohol use: No     Alcohol/week: 0.0 standard drinks    Drug use: No    Sexual activity: Not on file     Comment:    Other Topics Concern    Not on file   Social History Narrative    Not on file     Social Determinants of Health     Financial Resource Strain:     Difficulty of Paying Living Expenses:    Food Insecurity:     Worried About Running Out of Food in the Last Year:     [address] in the Last Year:    Transportation Needs:     Lack of Transportation (Medical):  Lack of Transportation (Non-Medical):    Physical Activity:     Days of Exercise per Week:     Minutes of Exercise per Session:    Stress:     Feeling of Stress :    Social Connections:     Frequency of Communication with Friends and Family:     Frequency of Social Gatherings with Friends and Family:     Attends Orthodox Services:     Active Member of Clubs or Organizations:     Attends Club or Organization Meetings:     Marital Status:    Intimate Partner Violence:     Fear of Current or Ex-Partner:     Emotionally Abused:     Physically Abused:     Sexually Abused:        MEDICATIONS   Medications Prior to Admission  Not in a hospital admission.     Current Medications  Current Facility-Administered Medications   Medication Dose Route Frequency Provider Last Rate Last Admin    piperacillin-tazobactam (ZOSYN) 3,375 mg in dextrose 5 % 50 mL IVPB extended infusion (mini-bag)  3,375 mg Intravenous Once Misael Weiss MD 12.5 mL/hr at 06/16/21 2134 Restarted at 06/16/21 2134    ondansetron (ZOFRAN) injection 4 mg  4 mg Intravenous Q6H PRN Elisabeth Motley MD        methylPREDNISolone sodium (SOLU-MEDROL) injection 60 mg  60 mg Intravenous Once Elisabeth Motley MD        ipratropium-albuterol (DUONEB) nebulizer solution 1 ampule  1 ampule Inhalation Q4H WA Elisabeth Motley MD         Current Outpatient Medications   Medication Sig Dispense Refill    carvedilol (COREG) 12.5 MG tablet TAKE 1 TABLET BY MOUTH TWICE A DAY 60 tablet 1    furosemide (LASIX) 20 MG tablet Take 1 tablet by mouth daily 30 tablet 3    [facility] ESOMEPRAZOLE MAGNESIUM PO Take 20 mg by mouth daily      levothyroxine (SYNTHROID) 25 MCG tablet TAKE 1 TABLET BY MOUTH EVERY DAY 90 tablet 1    fluticasone-salmeterol (WIXELA INHUB) 250-50 MCG/DOSE AEPB INHALE 1 PUFF INTO THE LUNGS EVERY 12 HOURS 3 Inhaler 1    apixaban (ELIQUIS) 5 MG TABS tablet TAKE 1 TABLET BY MOUTH TWICE A  tablet 1    atorvastatin (LIPITOR) 10 MG tablet TAKE 1 TABLET BY MOUTH EVERY DAY 90 tablet 1    albuterol (PROVENTIL) (5 MG/ML) 0.5% nebulizer solution Take 1 mL by nebulization 4 times daily as needed for Wheezing 120 each 5    nitroGLYCERIN (NITROSTAT) 0.4 MG SL tablet Place 1 tablet under the tongue every 5 minutes as needed for Chest pain 25 tablet 3    aspirin 81 MG tablet Take 81 mg by mouth daily      acetaminophen (TYLENOL) 500 MG tablet Take 500 mg by mouth every 6 hours as needed for Pain      multivitamin (THERAGRAN) per tablet Take 1 tablet by mouth daily.  albuterol (PROVENTIL;VENTOLIN) 90 MCG/ACT inhaler Inhale 2 puffs into the lungs 4 times daily as needed            Allergies  Allergies   Allergen Reactions    Sulfa Antibiotics Other (See Comments)     Cannot remember; rash per paper chart    Zoloft [Sertraline Hcl] Other (See Comments)     Sherrell          REVIEW OF SYSTEMS   10 point review systems conducted and pertinent positives and negatives as per HPI. PHYSICAL EXAM     Wt Readings from Last 3 Encounters:   05/15/21 168 lb 6.9 oz (76.4 kg)   05/13/21 155 lb (70.3 kg)   12/02/20 160 lb (72.6 kg)       Blood pressure 127/62, pulse 91, temperature 98.8 °F (37.1 °C), temperature source Oral, resp. rate 24, SpO2 97 %, not currently breastfeeding. GEN  -Awake, alert, moderate respiratory distress. EYES   -PERRL. HENT  -MM are moist.   RESP  -rhonchi, wheezing, tachypneic. C/V  -tachycardic, bilateral lower extremity edema. No reproducible chest wall tenderness.    GI  -Abdomen is soft, non-distended, no significant tenderness. No masses or guarding. + BS in all quadrants. Rectal exam deferred.   -No CVA tenderness. Hsieh catheter is not present. MS  -B/L extremities - No gross joint deformities. + swelling, intact sensation symmetrical.   SKIN  -Normal coloration, warm, dry. NEURO  - Awake, alert, oriented, answering questions appropriately, following commands. PSYC  - Appropriate affect. LABS AND IMAGING   Labs, imaging, previous records reviewed personally. sodium 132,Lactic acid 2.8, glucose 228, WBC 8.3, hemoglobin 12.1, platelets 294. Rapid Covid negative. UA-not suggestive of infection. Recent Imaging    XR CHEST PORTABLE [[id number]] Collected: 06/16/21 1943      Order Status: Completed Updated: 06/16/21 2319     Narrative:       EXAMINATION:   ONE XRAY VIEW OF THE CHEST     6/16/2021 7:25 pm     COMPARISON:   05/15/2021     HISTORY:   ORDERING SYSTEM PROVIDED HISTORY: sob   TECHNOLOGIST PROVIDED HISTORY:   Reason for exam:->sob   Reason for Exam: sob   Acuity: Acute   Type of Exam: Initial   Additional signs and symptoms: na   Relevant Medical/Surgical History: copd, chf     FINDINGS:   The patient has had a median sternotomy. Monitor wires overlie the chest.   The trachea is midline.  There is atherosclerotic calcification of the aortic   knob.  There is cardiomegaly.  There are bilateral pulmonary opacities. There are consolidative findings in the right lung field.  This could   represent severe edema.  Infiltrates cannot be excluded.  A mass cannot be   excluded in the right lung base.  CT could better evaluate lung parenchyma if   indicated.  Small pleural effusions.      Impression:       Bilateral pulmonary opacities which are significantly worse than on the prior   study.  Consolidative density in the right lung base.  This could represent   edema or infiltrates.  Pulmonary masses are difficult to entirely exclude.    CT could better evaluate lung parenchyma if indicated.

## 2021-06-17 NOTE — ED PROVIDER NOTES
Emergency [address] EMERGENCY DEPARTMENT    Patient: Fartun Llamas  MRN: [id number]  : 1933  Date of Evaluation: 2021  ED Provider: Brittany Peraza MD    Chief Complaint       Chief Complaint   Patient presents with    Respiratory Distress     Anne Poole is a 80 y.o. female who presents to the emergency department with respiratory distress. The patient is unable to provide history at this time. The aide that was with the patient states that she has been having some increasing shortness of breath just today. They turned her oxygen up from 2 L to 4 L. She had a mild change in her cough as well. No fevers or chills. After dinner she had sudden onset worsening shortness of breath and asked to go to the hospital.  From the time that they called the ambulance to the time that she arrived she had worsening hypoxia and increased work of breathing. EMS found the patient to be hypoxemic into the low 60s on room air. They put her on a nonrebreather, gave her a DuoNeb and nitro. About 3 weeks ago the patient was admitted for pneumonia, CHF exacerbation and septicemia. ROS:     Unable to participate in review of symptoms secondary to clinical condition    Past History     Past Medical History:   Diagnosis Date    A-fib ([address]     Bronchospasm     CHF (congestive heart failure) ([address]     COPD (chronic obstructive pulmonary disease) [facility] [address]     Essential hypertension     Family history of coronary artery disease     GERD (gastroesophageal reflux disease)     H/O cardiovascular stress test 2013    EF 70%, no ischemia, normal perfusion pattern in all regions, normal study.     H/O echocardiogram 2019    EF 56-37%, Grade I diastolic dysfunction, Mild septal wall asymmetrical left ventricular hypertophy, sclerotic but non stenotic aortic vavle, mitral annular calcification, Mild AR,     Headache     History of Stress:     Feeling of Stress :    Social Connections:     Frequency of Communication with Friends and Family:     Frequency of Social Gatherings with Friends and Family:     Attends Holiness Services:     Active Member of Clubs or Organizations:     Attends Club or Organization Meetings:     Marital Status:    Intimate Partner Violence:     Fear of Current or Ex-Partner:     Emotionally Abused:     Physically Abused:     Sexually Abused:        Medications/Allergies     Previous Medications    ACETAMINOPHEN (TYLENOL) 500 MG TABLET    Take 500 mg by mouth every 6 hours as needed for Pain    ALBUTEROL (PROVENTIL) (5 MG/ML) 0.5% NEBULIZER SOLUTION    Take 1 mL by nebulization 4 times daily as needed for Wheezing    ALBUTEROL (PROVENTIL;VENTOLIN) 90 MCG/ACT INHALER    Inhale 2 puffs into the lungs 4 times daily as needed     APIXABAN (ELIQUIS) 5 MG TABS TABLET    TAKE 1 TABLET BY MOUTH TWICE A DAY    ASPIRIN 81 MG TABLET    Take 81 mg by mouth daily    ATORVASTATIN (LIPITOR) 10 MG TABLET    TAKE 1 TABLET BY MOUTH EVERY DAY    CARVEDILOL (COREG) 12.5 MG TABLET    TAKE 1 TABLET BY MOUTH TWICE A DAY    [facility] ESOMEPRAZOLE MAGNESIUM PO    Take 20 mg by mouth daily    FLUTICASONE-SALMETEROL (WIXELA INHUB) 250-50 MCG/DOSE AEPB    INHALE 1 PUFF INTO THE LUNGS EVERY 12 HOURS    FUROSEMIDE (LASIX) 20 MG TABLET    Take 1 tablet by mouth daily    LEVOTHYROXINE (SYNTHROID) 25 MCG TABLET    TAKE 1 TABLET BY MOUTH EVERY DAY    MULTIVITAMIN (THERAGRAN) PER TABLET    Take 1 tablet by mouth daily.       NITROGLYCERIN (NITROSTAT) 0.4 MG SL TABLET    Place 1 tablet under the tongue every 5 minutes as needed for Chest pain     Allergies   Allergen Reactions    Sulfa Antibiotics Other (See Comments)     Cannot remember; rash per paper chart    Zoloft [Sertraline Hcl] Other (See Comments)     Sherrell           Physical Exam       ED Triage Vitals [06/16/21 1915]   BP Temp Temp Source Pulse Resp SpO2 Height Weight   (!) 171/94 98.8 °F (37.1 °C) Oral 116 29 90 % -- --     GENERAL APPEARANCE: Awake and alert. Cooperative. Severe respiratory distress  HEAD: Normocephalic. Atraumatic. EYES: Sclera anicteric. ENT: Tolerates saliva. No trismus. NECK: Supple. Trachea midline. CARDIO: Irregularly irregular and tachycardic  LUNGS: Tachypneic with respiratory distress and diffuse crackles, no wheezes  ABDOMEN: Soft. Non-distended. Non-tender. EXTREMITIES: No acute deformities. SKIN: Warm and dry. NEUROLOGICAL: No gross facial drooping. Moves all 4 extremities spontaneously.    PSYCHIATRIC: Unable to assess    Diagnostics   Labs:  Results for orders placed or performed during the hospital encounter of 06/16/21   COVID-19, Rapid    Specimen: Nasopharyngeal   Result Value Ref Range    Source THROAT     SARS-CoV-2, NAAT NOT DETECTED NOT DETECTED   CBC Auto Differential   Result Value Ref Range    WBC 8.3 4.0 - 10.5 K/CU MM    RBC 3.97 (L) 4.2 - 5.4 M/CU MM    Hemoglobin 12.1 (L) 12.5 - 16.0 GM/DL    Hematocrit 40.1 37 - 47 %    .0 (H) 78 - 100 FL    MCH 30.5 27 - 31 PG    MCHC 30.2 (L) 32.0 - 36.0 %    RDW 13.0 11.7 - 14.9 %    Platelets 821 281 - 388 K/CU MM    MPV 9.7 7.5 - 11.1 FL    Differential Type AUTOMATED DIFFERENTIAL     Segs Relative 36.1 36 - 66 %    Lymphocytes % 52.6 (H) 24 - 44 %    Monocytes % 7.6 (H) 0 - 4 %    Eosinophils % 1.2 0 - 3 %    Basophils % 0.5 0 - 1 %    Segs Absolute 3.0 K/CU MM    Lymphocytes Absolute 4.4 K/CU MM    Monocytes Absolute 0.6 K/CU MM    Eosinophils Absolute 0.1 K/CU MM    Basophils Absolute 0.0 K/CU MM    Nucleated RBC % 0.0 %    Total Nucleated RBC 0.0 K/CU MM    Total Immature Neutrophil 0.17 K/CU MM    Immature Neutrophil % 2.0 (H) 0 - 0.43 %   Comprehensive Metabolic Panel w/ Reflex to MG   Result Value Ref Range    Sodium 132 (L) 135 - 145 MMOL/L    Potassium 4.6 3.5 - 5.1 MMOL/L    Chloride 93 (L) 99 - 110 mMol/L    CO2 28 21 - 32 MMOL/L    BUN 17 6 - 23 MG/DL    CREATININE 0.9 0.6 - 1.1 MG/DL    Glucose 224 (H) 70 - 99 MG/DL    Calcium 9.6 8.3 - 10.6 MG/DL    Albumin 3.7 3.4 - 5.0 GM/DL    Total Protein 7.0 6.4 - 8.2 GM/DL    Total Bilirubin 0.2 0.0 - 1.0 MG/DL    ALT 13 10 - 40 U/L    AST 18 15 - 37 IU/L    Alkaline Phosphatase 106 40 - 129 IU/L    GFR Non- 59 (L) >60 mL/min/1.73m2    GFR African American >60 >60 mL/min/1.73m2    Anion Gap 11 4 - 16   Troponin   Result Value Ref Range    Troponin T 0.011 (H) <0.01 NG/ML   Brain Natriuretic Peptide   Result Value Ref Range    Pro-BNP 6,760 (H) <300 PG/ML   Lactate, Sepsis   Result Value Ref Range    Lactic Acid, Sepsis 2.8 (HH) 0.5 - 1.9 mMOL/L     Radiographs:  XR CHEST PORTABLE    Result Date: 2021  EXAMINATION: ONE XRAY VIEW OF THE CHEST 2021 7:25 pm COMPARISON: 05/15/2021 HISTORY: ORDERING SYSTEM PROVIDED HISTORY: sob TECHNOLOGIST PROVIDED HISTORY: Reason for exam:->sob Reason for Exam: sob Acuity: Acute Type of Exam: Initial Additional signs and symptoms: na Relevant Medical/Surgical History: copd, chf FINDINGS: The patient has had a median sternotomy. Monitor wires overlie the chest. The trachea is midline. There is atherosclerotic calcification of the aortic knob. There is cardiomegaly. There are bilateral pulmonary opacities. There are consolidative findings in the right lung field. This could represent severe edema. Infiltrates cannot be excluded. A mass cannot be excluded in the right lung base. CT could better evaluate lung parenchyma if indicated. Small pleural effusions. Bilateral pulmonary opacities which are significantly worse than on the prior study. Consolidative density in the right lung base. This could represent edema or infiltrates. Pulmonary masses are difficult to entirely exclude. CT could better evaluate lung parenchyma if indicated.        Procedures/EK lead EKG per my interpretation:  Sinus Tachycardia 117  Axis is   Left axis deviation  QTc is  normal  There is no specific T wave changes appreciated. There is no specific ST wave changes appreciated. Prior EKG to compare with was available lots of artifact thus difficult to interpret however no evidence of ST changes indicative of ischemia at this time      ED Course and MDM   In brief, Rodney Chris is a 80 y.o. female who presented to the emergency department with severe respiratory distress secondary to CHF exacerbation and likely superimposed pneumonia. Patient was placed on BiPAP with improvement in her respiratory effort, her oxygen saturation and her work of breathing. She was also given 80 of Lasix and started on broad-spectrum antibiotics given that she was recently admitted to the hospital.  Patient's chest x-ray was confirmatory with concern for superimposed pneumonia. Troponin elevation is actually improved from previous. The patient remained tachypneic into the 30s thus BiPAP remains on and hospitalist was consulted for admission. Discussed CODE STATUS with the patient however she was unable to participate without discussion and the daughter stated that she wanted her mother to make the decision thus CODE STATUS was unable to be established at this time, however they were clear that the patient did not want any further invasive cardiac procedures such as a cardiac cath or repair for her known thoracic aneurysm. Total critical care time today provided was at least 35 minutes. This excludes seperately billable procedure. Critical care time provided for acute on chronic hypoxemic respiratory failure that required close evaluation and/or intervention with concern for patient decompensation.       ED Medication Orders (From admission, onward)    Start Ordered     Status Ordering Provider    06/16/21 2230 06/16/21 2225  methylPREDNISolone sodium (SOLU-MEDROL) injection 60 mg  ONCE      Last MAR action: Given - by Shadi Sanabria on 06/16/21 at [address]    06/16/21 2230 06/16/21 2225 ipratropium-albuterol (DUONEB) nebulizer solution 1 ampule  EVERY 4 HOURS WHILE AWAKE     Question:  Initiate RT Bronchodilator Protocol  Answer:  Yes    Last MAR action: Given - by Freddy Tapia on 06/16/21 at [address] [facility]    06/16/21 2216 06/16/21 2216  ondansetron (ZOFRAN) injection 4 mg  EVERY 6 HOURS PRN      Last MAR action: Given - by Rob Link on 06/16/21 at [address]    06/16/21 1945 06/16/21 1936  piperacillin-tazobactam (ZOSYN) 3,375 mg in dextrose 5 % 50 mL IVPB extended infusion (mini-bag)  ONCE     Question:  Antimicrobial Indications  Answer:  Pneumonia (CAP)    Last MAR action: Restarted - by Rob Link on 06/16/21 at [address] [facility]    06/16/21 1945 06/16/21 1936  levoFLOXacin (LEVAQUIN) 750 MG/150ML infusion 750 mg  ONCE     Question:  Antimicrobial Indications  Answer:  Pneumonia (CAP)    Last MAR action: Stopped - by Rob Link on 06/16/21 at 2[address] [facility]    06/16/21 1930 06/16/21 1918  furosemide (LASIX) injection 80 mg  ONCE      Last MAR action: Given - by Rob Link on 06/16/21 at [address] [facility]          Final Impression      1. Pneumonia of both lungs due to infectious organism, unspecified part of lung    2.  Acute hypoxemic respiratory failure (HCC)    3. Elevated troponin      DISPOSITION Admitted 06/16/2021 10:34:56 PM     (Please note that portions of this note may have been completed with a voice recognition program. Efforts were made to edit the dictations but occasionally words are mis-transcribed.)    Elizabeth Fuentes MD  [id number] MD  06/16/21 7900

## 2021-06-17 NOTE — CONSULTS
INPATIENT CARDIOLOGY CONSULT NOTE       Reason for consultation:  chf    Referring physician:  Jean Carlos Llanos MD     Primary care physician: Candelario Cranker, MD      Dear Jean Carlos Llanos MD Thank you for the consult    Chief Complaint   Patient presents with    Respiratory Distress       History of present illness:Natalie is a 80 y. o.year old who  presents with  Chief Complaint   Patient presents with    Respiratory Distress     Patient is a 24-year-old female with prior medical history significant for her heart failure, A. fib, COPD, sleep apnea, CAD post CABG,  Is admitted to the hospital with chief complaint of shortness of breath. Patient was last seen in the hospital for CHF exacerbation last month. Patient brought to the hospital for shortness of breath for 1 day. Patient has been tachypneic with some fluid overload's. No fever chills rigors noted. EMS noted that the patient was saturating around 60% on room air. She was put on a nonrebreather and transferred to the hospital    Chest x-ray shows bilateral pulmonary opacities  Patient received Lasix in the ER  Symptoms greatly improved this morning. Past medical history:    has a past medical history of A-fib ([address] Bronchospasm, CHF (congestive heart failure) ([address] COPD (chronic obstructive pulmonary disease) ([address] Essential hypertension, Family history of coronary artery disease, GERD (gastroesophageal reflux disease), H/O cardiovascular stress test, H/O echocardiogram, Headache, History of PTCA, Hx of cardiovascular stress test, Hx of echocardiogram, Hyperlipidemia, Obesity, Paroxysmal atrial fibrillation ([address] Postmenopausal state, Restrictive lung disease, and VHD (valvular heart disease). Past surgical history:   has a past surgical history that includes Cholecystectomy; Coronary angioplasty (2005, 1995); Thoracentesis (Left, 08/15/2016);  Cataract removal with implant (Bilateral, 2006); and Coronary artery bypass graft (07/2016). Social History:   reports that she has never smoked. She has never used smokeless tobacco. She reports that she does not drink alcohol and does not use drugs.   Family history:   no family history of CAD, STROKE of DM    Allergies   Allergen Reactions    Sulfa Antibiotics Other (See Comments)     Cannot remember; rash per paper chart    Zoloft [Sertraline Hcl] Other (See Comments)     Shakes          sodium chloride flush 0.9 % injection 5-40 mL, 2 times per day  sodium chloride flush 0.9 % injection 5-40 mL, PRN  0.9 % sodium chloride infusion, PRN  polyethylene glycol (GLYCOLAX) packet 17 g, Daily PRN  acetaminophen (TYLENOL) tablet 650 mg, Q6H PRN   Or  acetaminophen (TYLENOL) suppository 650 mg, Q6H PRN  furosemide (LASIX) injection 40 mg, BID  apixaban (ELIQUIS) tablet 5 mg, BID  aspirin chewable tablet 81 mg, Daily  atorvastatin (LIPITOR) tablet 10 mg, Daily  carvedilol (COREG) tablet 12.5 mg, BID WC  budesonide-formoterol (SYMBICORT) 160-4.5 MCG/ACT inhaler 2 puff, BID  levothyroxine (SYNTHROID) tablet 25 mcg, Daily  methylPREDNISolone sodium (SOLU-MEDROL) injection 40 mg, Q8H  pantoprazole (PROTONIX) tablet 40 mg, QAM AC  piperacillin-tazobactam (ZOSYN) 3,375 mg in dextrose 5 % 50 mL IVPB extended infusion (mini-bag), Once  ondansetron (ZOFRAN) injection 4 mg, Q6H PRN  ipratropium-albuterol (DUONEB) nebulizer solution 1 ampule, Q4H WA      Current Facility-Administered Medications   Medication Dose Route Frequency Provider Last Rate Last Admin    sodium chloride flush 0.9 % injection 5-40 mL  5-40 mL Intravenous 2 times per day Iggy Castillo MD   10 mL at 06/17/21 1007    sodium chloride flush 0.9 % injection 5-40 mL  5-40 mL Intravenous PRN Iggy Castillo MD        0.9 % sodium chloride infusion  25 mL Intravenous PRN Iggy Castillo MD        polyethylene glycol (GLYCOLAX) packet 17 g  17 g Oral Daily PRN Iggy Castillo MD        acetaminophen (TYLENOL) tablet 650 mg  650 mg Oral Q6H PRN Beverly Zelaya MD        Or    acetaminophen (TYLENOL) suppository 650 mg  650 mg Rectal Q6H PRN Beverly Zelaya MD        furosemide (LASIX) injection 40 mg  40 mg Intravenous BID Beverly Zelaya MD   40 mg at 06/17/21 1006    apixaban (ELIQUIS) tablet 5 mg  5 mg Oral BID Beverly Zelaya MD   5 mg at 06/17/21 1006    aspirin chewable tablet 81 mg  81 mg Oral Daily Beverly Zelaya MD   81 mg at 06/17/21 1006    atorvastatin (LIPITOR) tablet 10 mg  10 mg Oral Daily Beverly Zelaya MD   10 mg at 06/17/21 1007    carvedilol (COREG) tablet 12.5 mg  12.5 mg Oral BID WC Beverly Zelaya MD   12.5 mg at 06/17/21 1007    budesonide-formoterol (SYMBICORT) 160-4.5 MCG/ACT inhaler 2 puff  2 puff Inhalation BID Beverly Zelaya MD   2 puff at 06/17/21 0919    levothyroxine (SYNTHROID) tablet 25 mcg  25 mcg Oral Daily Beverly Zelaya MD   25 mcg at 06/17/21 1007    methylPREDNISolone sodium (SOLU-MEDROL) injection 40 mg  40 mg Intravenous Q8H Beverly Zelaya MD   40 mg at 06/17/21 0657    pantoprazole (PROTONIX) tablet 40 mg  40 mg Oral QAM AC Chanel Pringle MD   40 mg at 06/17/21 1006    piperacillin-tazobactam (ZOSYN) 3,375 mg in dextrose 5 % 50 mL IVPB extended infusion (mini-bag)  3,375 mg Intravenous Once Erich Spence MD   Stopped at 06/17/21 0517    ondansetron (ZOFRAN) injection 4 mg  4 mg Intravenous Q6H PRN Beverly Zelaya MD   4 mg at 06/16/21 2236    ipratropium-albuterol (DUONEB) nebulizer solution 1 ampule  1 ampule Inhalation Q4H WA Beverly Zelaya MD   1 ampule at 06/17/21 1235         Review of Systems:     · Constitutional: No Fever or Weight Loss   · Eyes: No Decreased Vision  · ENT: No Headaches, Hearing Loss or Vertigo  · Cardiovascular: No chest pain, dyspnea on exertion, palpitations or loss of consciousness  · Respiratory: No cough or wheezing    · Gastrointestinal: No abdominal pain, appetite loss, blood in stools, constipation, diarrhea or heartburn  · Genitourinary: No dysuria, trouble voiding, or hematuria  · Musculoskeletal:  No gait disturbance, weakness or joint complaints  · Integumentary: No rash or pruritis  · Neurological: No TIA or stroke symptoms  · Psychiatric: No anxiety or depression  · Endocrine: No malaise, fatigue or temperature intolerance  · Hematologic/Lymphatic: No bleeding problems, blood clots or swollen lymph nodes  · Allergic/Immunologic: No nasal congestion or hives    All other systems were reviewed and were negative otherwise. Physical Examination:      Vitals:    06/17/21 1235   BP:    Pulse:    Resp: 17   Temp:    SpO2: 96%      Wt Readings from Last 3 Encounters:   06/17/21 169 lb 8.5 oz (76.9 kg)   05/15/21 168 lb 6.9 oz (76.4 kg)   05/13/21 155 lb (70.3 kg)     Body mass index is 34.24 kg/m². General Appearance:  No distress, conversant  Constitutional:  Well developed, Well nourished  HEENT:  Normocephalic, Atraumatic, Oropharynx moist, No oral exudates,   Nose normal. Neck Supple Carotid: no carotid bruit  Eyes:  Conjunctiva normal, No discharge. Respiratory:    Normal breath sounds, No respiratory distress, No wheezing, no use of accessory muscles, diaphragm movement is normal  No chest Tenderness ++  fine crackles at the base. Cardiovascular: S1-S2 No murmurs auscultated. No rubs, thrills or gallops. Normal  rhythm. Pedal pulses are normal. ++ pedal edema  GI:  Soft Non tender, non distended. :  No CVA tenderness. Musculoskeletal:   No tenderness, No cyanosis, No clubbing. Integument:  Warm, Dry, No erythema, No rash. Lymphatic:  No lymphadenopathy noted. Neurologic:  Alert & oriented x 3  No focal deficits noted.    Psychiatric:  Affect normal, Judgment normal, Mood normal.       Lab Review     Recent Labs     06/16/21 1930   WBC 8.3   HGB 12.1*   HCT 40.1         Recent Labs     06/16/21 1930   *   K 4.6   CL 93*   CO2 28 BUN 17   CREATININE 0.9     Recent Labs     06/16/21 1930   AST 18   ALT 13   BILITOT 0.2   ALKPHOS 106     No results for input(s): TROPONINI in the last 72 hours. Lab Results   Component Value Date    BNP 40.6 01/28/2013     Lab Results   Component Value Date    INR 1.25 05/13/2021    PROTIME 15.1 (H) 05/13/2021         All labs, images, EKGs were personally reviewed      Assessment: 80 y. o.year old with PMH of  has a past medical history of A-fib ([facility] [address] Bronchospasm, CHF (congestive heart failure) ([facility] [address] COPD (chronic obstructive pulmonary disease) ([facility] [address] [facility] Essential hypertension, Family history of coronary artery disease, GERD (gastroesophageal reflux disease), H/O cardiovascular stress test, H/O echocardiogram, Headache, History of PTCA, Hx of cardiovascular stress test, Hx of echocardiogram, Hyperlipidemia, Obesity, Paroxysmal atrial fibrillation ([facility] Postmenopausal state, Restrictive lung disease, and VHD (valvular heart disease). Recommendations:        1. Acute hypoxic/hypercapnic respiratory failure likely secondary to ? Pneumonia with elevated lactic acid    2. Congestive heart failure, diastolic. Mild acute on chronic. Continue IV Lasix   3. History of coronary artery disease s/p CABG. Refused left heart cath last admission. Conservative management. Continue with aspirin beta-blocker and statins  4. Paroxysmal atrial fibrillation. Continue with beta-blocker/Eliquis  5. Moderate aortic insufficiency: Continue with diuretics. IV Lasix 40 twice daily. 6. Hyperlipidemia: Continue with Lipitor  7. History of stroke: Continue aspirin statin  8. Recommend sleep study to rule out sleep apnea.     Case discussed with patient's family at bedside      Thank you for the consult    Dr. Micha Rojo  6/17/2021 12:42 PM

## 2021-06-17 NOTE — RT PROTOCOL NOTE
RT Inhaler-Nebulizer Bronchodilator Protocol Note    There is a bronchodilator order in the chart from a provider indicating to follow the RT Bronchodilator Protocol and there is an Initiate RT Bronchodilator Protocol order as well (see protocol at bottom of note). The findings from the last RT Protocol Assessment were as follows:  Smoking: Non smoker  Surgical Status: No surgery  Xray: Multiple lobe infiltrates/atelectasis/pleural effusion/edema  Respiratory Pattern: RR <12 or 21-30  Mental Status: Alert and Oriented  Breath Sounds: Scattered wheeze  Cough: Strong, productive  Activity Level: Mostly sedentary, minimal walking  Oxygen Requirement: Room Air - 2LNC/28% or home setting  Indication for Bronchodilator Therapy:    Bronchodilator Assessment Score: 7    Aerosolized bronchodilator medication orders have been revised according to the RT Bronchodilator Protocol. RT Inhaler-Nebulizer Bronchodilator Protocol:    Respiratory Therapist to perform RT Therapy Protocol Assessment then follow the protocol. No Indications - adjust the frequency to every 6 hours PRN wheezing or bronchospasm, if no treatments needed after 48 hours then discontinue using Per Protocol order mode. If indication present, adjust the RT bronchodilator orders based on the Bronchodilator Assessment Score as follows:    0-6 - enter or revise RT bronchodilator order to Albuterol Inhaler order with frequency of every 2 hours PRN for wheezing or increased work of breathing using Per Protocol order mode. If Albuterol Inhaler not tolerated or not effective, then discontinue the Albuterol Inhaler order and enter Albuterol Nebulizer order with same frequency and PRN reasons. Repeat RT Therapy Protocol Assessment as needed.     7-10 - discontinue any other Inpatient aerosolized bronchodilator medication orders and enter or revise two Albuterol Inhaler orders, one with BID frequency and one with frequency of every 2 hours PRN wheezing or increased work of breathing using Per Protocol order mode. Repeat RT Therapy Protocol Assessment with second treatment then BID and as needed. If Albuterol Inhaler not tolerated or not effective, then discontinue the Albuterol Inhaler orders and enter two Albuterol Nebulizer orders with same frequencies and PRN reasons. 11-13 - discontinue any other Inpatient aerosolized bronchodilator medication orders and enter DuoNeb Nebulizer orders QID frequency and an Albuterol Nebulizer order every 2 hours PRN wheezing or increased work of breathing using Per Protocol order mode. Repeat RT Therapy Protocol Assessment with second treatment then QID and as needed. Greater than 13 - discontinue any other Inpatient bronchodilator aerosolized medication orders and enter DuoNeb Nebulizer order every 4 hours frequency and Albuterol Nebulizer every 2 hours PRN wheezing or increased work of breathing using Per Protocol order mode. Repeat RT Therapy Protocol Assessment with second treatment then every 4 hours and as needed. RT to enter RT Home Evaluation for COPD & MDI Assessment order using Per Protocol order mode.     Electronically signed by Mart Vick RCP on 6/17/2021 at 9:40 AM

## 2021-06-17 NOTE — PROGRESS NOTES
Hospitalist Progress Note      Name:  Kerry Green /Age/Sex: 1933  (80 y.o. female)   MRN & CSN:  [id number] & [id number] Admission Date/Time: 2021  7:12 PM   Location:  -A PCP: Mari Araujo MD       Hospital Day: 2      Assessment and Plan:       #. Acute on chronic respiratory failure with hypoxia and hypercapnia. -As per information patient was saturating 60% EMS  -Patient was placed on BiPAP in ER  -VBG-pH 7.30, PCO2 63, PO2 83, HCO3 31 upon arrival  -Rapid Covid negative. He is fully vaccinated  -She was initially started on BiPAP, now on nasal cannula. #.  Acute on chronic diastolic congestive heart failure:  -proBNP 6760 upon admission  -Bilateral lower extremity edema noted upon arrival  -Patient is compliant with diuretics as per daughter.  -Continue IV Lasix 40 mg twice daily  -2D echo-EF 50%-2021.  -Cardiology consult appreciated     #.  Mild hypervolemic hyponatremia  -Diurese and monitor     #. Elevated lactic acid:  -Does not appear to be having any signs and symptoms of infection.  -Chest x-ray-bilateral pulmonary opacities which are significantly worse  -Patient received levofloxacin, Zosyn in ER.  -Procalcitonin not elevated. -Patient was recently treated with antibiotics a month ago. -Patient denied any fever, chills, has chronic cough  -Hold off antibiotics. #. coronary artery disease s/p CABG (7/15)  -Continue aspirin, Eliquis, atorvastatin, carvedilol     #. chronic diastolic congestive heart failure  -Currently in exacerbation  -Patient is on 20 mg of Lasix daily     #. paroxysmal atrial fibrillation, on anticoagulation  -Continue carvedilol, Eliquis     #. CVA 4 years ago post cardiac cath (2016)  -As per daughter at bedside patient is mostly bedbound, requires maximum assistance with ADLs, can feed herself     #. ascending aortic aneurysm and moderate aortic insufficiency     #.  COPD, chronic respiratory failure on home oxygen at 2 L/min  -Treating as acute exacerbation  -Continue DuoNeb every 4 hours while awake, IV methylprednisolone     #. Hypertension-continue carvedilol     #. Hyperlipidemia-continue atorvastatin     #. GERD-Protonix     #. Hypothyroidism-levothyroxine     #. moderate obesity-with the BMI 34.34     #. recent admission to the hospital 5/13-5/16/21     #. Patient on hospice care as per her daughter.  -As per outpatient pharmacy patient was prescribed liquid morphine, lorazepam, hyoscyamine, Haldol. DVT Prophylaxis: Eliquis  GI Prophylaxis: Protonix  Code Status: Limited-DNR/DNI, no defibrillation. The admitting hospitalist discussed with patient's daughter at bedside who is her POA. Subjective:     Spoke with dad daughter at the bedside today. She had just gotten back from the cafeteria and gotten her mom something to eat in the morning. Daughter stated that they have 24-hour caregivers at home. Spoke with . The caregivers are paid for his privately. Patient was on hospice at home and revoked upon admission. The plan is to reenroll with hospice upon discharge. She needs a new hospice order upon discharge. She states that she was breathing better today. But she still had fine crackles in her lung correction up. She was also wheezing on exam.    She is with [facility] hospice      PCT was negative yesterday      Chief Complaint   Patient presents with    Respiratory Distress       Per ED triage note:       Pt S&E. Pt presents to ED from home per EMS for respiratory distress. When EMS arrived pt was on 2L NC with O2 sat at 79%. Pt arrived on 4L with albuterul breathing tx applied. Pt has labored RR and is 89% O2 saturation. Seng Lockhart is at bedside.  Respiratory is at bedside              May 13 to May 16 admission noted, discharge summary below:     Discharge Diagnoses and Hospital Course:   Karyn Thomas is a 80 y.o.  female  who presents with SOB     Acute hypoxic and hypercapnic respiratory failure possibly d/t Community-acquired pneumonia versus fluid overload, versus flash pulmonary edema  Possible PNA  NSTEMI  Also has lower extremity edema and crackles, possibly fluid overload, however responded to BiPAP  CXR with bilateral infiltrates, consistent with pulmonary edema  Ceftriaxone and azithromycin>>Continue Cefdinir at discharge  Respiratory viral panel negative  Pulmonary recommendations appreciated  Cardiology recommendations appreciated-Plan for heart cath as troponin's trending up, family debating at this time. Patient refused  heart cath. Continue Aspirin  Coreg dose adjusted  Taper prednisone  Echocardiogram with EF 50% with Grade I DD     Lactic acidosis-Resolved  Likely with Hypoxia     Acquired hypothyroidism  Continue Synthroid     Paroxysmal atrial fibrillation  Continue Eliquis and Beta blocker     Coronary artery disease involving coronary bypass graft of native heart without angina pectoris  Aspirin, Lipitor     Chronic obstructive pulmonary disease, likely not in acute exacerbation      Prior cardiac cath as below:    Coronary anatomy:   The left main coronary artery has no significant disease. Left anterior descending artery has 60% mid stenosis. D1 has 99% mid stenosis     Circumflex artery has 90% instent stenosis going into a medium size OM. A large PL occluded filling from collaterls. .     The right coronary artery is a dominant vessel with  Patent stents. Left ventriculogram shows ejection fraction of 55%. Normal wall motion.      LIMA patent     Impression:   SEVERE NATIVE CAD  DILATED AORTA  PAF          Patient Active Problem List   Diagnosis Code    CAD (coronary artery disease) I25.10    History of PTCA Z98.61    Hyperlipidemia E78.5    Hypertension I10    Obesity E66.9    VHD (valvular heart disease) I39    H/O cardiovascular stress test Z92.89    Chest pain R07.9    Paroxysmal atrial fibrillation (HCC) I48.0    UTI (urinary tract infection) N39.0      Recommendations:  CABG and MAZE  Findings are reviewed and discussed  with patient and family. Questions are answered. Post procedure activity restrictions and continued risk modification and follow up recommended. Rudolph Morataya MD, 1/17/2016 8:31 AM       Objective: Intake/Output Summary (Last 24 hours) at 6/17/2021 1056  Last data filed at 6/16/2021 2133  Gross per 24 hour   Intake 150 ml   Output --   Net 150 ml      Vitals:   Vitals:    06/17/21 1007   BP: (!) 108/52   Pulse: 66   Resp:    Temp:    SpO2:          Labs:   CBC:   Lab Results   Component Value Date    WBC 8.3 06/16/2021    HGB 12.1 06/16/2021    HCT 40.1 06/16/2021    .0 06/16/2021     06/16/2021     BMP:   Lab Results   Component Value Date     06/16/2021    K 4.6 06/16/2021    CL 93 06/16/2021    CO2 28 06/16/2021    PHOS 4.2 07/16/2016    BUN 17 06/16/2021    CREATININE 0.9 06/16/2021    CALCIUM 9.6 06/16/2021       Physical Exam:      Physical Exam  Cardiovascular:      Rate and Rhythm: Normal rate. Pulmonary:      Effort: No respiratory distress. Breath sounds: Wheezing and rales present. Abdominal:      General: Abdomen is flat. Skin:     Coloration: Skin is not jaundiced. Neurological:      Mental Status: She is alert. Cranial Nerves: No cranial nerve deficit.            Medications:   Medications:    sodium chloride flush  5-40 mL Intravenous 2 times per day    furosemide  40 mg Intravenous BID    apixaban  5 mg Oral BID    aspirin  81 mg Oral Daily    atorvastatin  10 mg Oral Daily    carvedilol  12.5 mg Oral BID WC    budesonide-formoterol  2 puff Inhalation BID    levothyroxine  25 mcg Oral Daily    methylPREDNISolone  40 mg Intravenous Q8H    pantoprazole  40 mg Oral QAM AC    piperacillin-tazobactam  3,375 mg Intravenous Once    ipratropium-albuterol  1 ampule Inhalation Q4H WA      Infusions:    sodium chloride       PRN Meds: sodium chloride flush, 5-40 mL, PRN  sodium chloride, 25 mL, PRN  polyethylene glycol, 17 g, Daily PRN  acetaminophen, 650 mg, Q6H PRN   Or  acetaminophen, 650 mg, Q6H PRN  ondansetron, 4 mg, Q6H PRN          Electronically signed by Yogesh Maynard MD on 6/17/2021 at 10:56 AM

## 2021-06-17 NOTE — PROGRESS NOTES
Patient arrived to unit per ED cart, on 6L O2, patient transferred to unit bed, patient alert and oriented to self, time, place. Skin assessment completed with charge, no open areas noted. Patient oriented to room, call light, staff.

## 2021-06-17 NOTE — PLAN OF CARE
Problem: Falls - Risk of:  Goal: Will remain free from falls  Description: Will remain free from falls  Outcome: Ongoing  Goal: Absence of physical injury  Description: Absence of physical injury  Outcome: Ongoing     Problem: Skin Integrity:  Goal: Will show no infection signs and symptoms  Description: Will show no infection signs and symptoms  Outcome: Ongoing  Goal: Absence of new skin breakdown  Description: Absence of new skin breakdown  Outcome: Ongoing     Problem: Breathing Pattern - Ineffective:  Goal: Ability to achieve and maintain a regular respiratory rate will improve  Description: Ability to achieve and maintain a regular respiratory rate will improve  Outcome: Ongoing

## 2021-06-18 ENCOUNTER — APPOINTMENT (OUTPATIENT)
Dept: CT IMAGING | Age: 86
DRG: 280 | End: 2021-06-18
Payer: MEDICARE

## 2021-06-18 ENCOUNTER — APPOINTMENT (OUTPATIENT)
Dept: GENERAL RADIOLOGY | Age: 86
DRG: 280 | End: 2021-06-18
Payer: MEDICARE

## 2021-06-18 LAB
ADENOVIRUS DETECTION BY PCR: NOT DETECTED
ANION GAP SERPL CALCULATED.3IONS-SCNC: 8 MMOL/L (ref 4–16)
BASOPHILS ABSOLUTE: 0 K/CU MM
BASOPHILS RELATIVE PERCENT: 0.1 % (ref 0–1)
BORDETELLA PARAPERTUSSIS BY PCR: NOT DETECTED
BORDETELLA PERTUSSIS PCR: NOT DETECTED
BUN BLDV-MCNC: 31 MG/DL (ref 6–23)
CALCIUM SERPL-MCNC: 9.1 MG/DL (ref 8.3–10.6)
CHLAMYDOPHILA PNEUMONIA PCR: NOT DETECTED
CHLORIDE BLD-SCNC: 96 MMOL/L (ref 99–110)
CO2: 35 MMOL/L (ref 21–32)
CORONAVIRUS 229E PCR: NOT DETECTED
CORONAVIRUS HKU1 PCR: NOT DETECTED
CORONAVIRUS NL63 PCR: NOT DETECTED
CORONAVIRUS OC43 PCR: NOT DETECTED
CREAT SERPL-MCNC: 1.2 MG/DL (ref 0.6–1.1)
DIFFERENTIAL TYPE: ABNORMAL
EKG ATRIAL RATE: 141 BPM
EKG ATRIAL RATE: 357 BPM
EKG DIAGNOSIS: NORMAL
EKG DIAGNOSIS: NORMAL
EKG Q-T INTERVAL: 274 MS
EKG Q-T INTERVAL: 284 MS
EKG QRS DURATION: 84 MS
EKG QRS DURATION: 86 MS
EKG QTC CALCULATION (BAZETT): 396 MS
EKG QTC CALCULATION (BAZETT): 437 MS
EKG R AXIS: -15 DEGREES
EKG R AXIS: -49 DEGREES
EKG T AXIS: 161 DEGREES
EKG T AXIS: 222 DEGREES
EKG VENTRICULAR RATE: 117 BPM
EKG VENTRICULAR RATE: 153 BPM
EOSINOPHILS ABSOLUTE: 0 K/CU MM
EOSINOPHILS RELATIVE PERCENT: 0 % (ref 0–3)
GFR AFRICAN AMERICAN: 51 ML/MIN/1.73M2
GFR NON-AFRICAN AMERICAN: 42 ML/MIN/1.73M2
GLUCOSE BLD-MCNC: 117 MG/DL (ref 70–99)
GLUCOSE BLD-MCNC: 118 MG/DL (ref 70–99)
GLUCOSE BLD-MCNC: 119 MG/DL (ref 70–99)
HCT VFR BLD CALC: 33.1 % (ref 37–47)
HEMOGLOBIN: 10.4 GM/DL (ref 12.5–16)
HUMAN METAPNEUMOVIRUS PCR: NOT DETECTED
IMMATURE NEUTROPHIL %: 0.9 % (ref 0–0.43)
INFLUENZA A BY PCR: NOT DETECTED
INFLUENZA A H1 (2009) PCR: NOT DETECTED
INFLUENZA A H1 PANDEMIC PCR: NOT DETECTED
INFLUENZA A H3 PCR: NOT DETECTED
INFLUENZA B BY PCR: NOT DETECTED
LYMPHOCYTES ABSOLUTE: 1.6 K/CU MM
LYMPHOCYTES RELATIVE PERCENT: 13.8 % (ref 24–44)
MAGNESIUM: 1.5 MG/DL (ref 1.8–2.4)
MCH RBC QN AUTO: 31.1 PG (ref 27–31)
MCHC RBC AUTO-ENTMCNC: 31.4 % (ref 32–36)
MCV RBC AUTO: 99.1 FL (ref 78–100)
MONOCYTES ABSOLUTE: 1 K/CU MM
MONOCYTES RELATIVE PERCENT: 8.1 % (ref 0–4)
MYCOPLASMA PNEUMONIAE PCR: NOT DETECTED
NUCLEATED RBC %: 0 %
PARAINFLUENZA 1 PCR: NOT DETECTED
PARAINFLUENZA 2 PCR: NOT DETECTED
PARAINFLUENZA 3 PCR: NOT DETECTED
PARAINFLUENZA 4 PCR: NOT DETECTED
PDW BLD-RTO: 13.1 % (ref 11.7–14.9)
PLATELET # BLD: 264 K/CU MM (ref 140–440)
PMV BLD AUTO: 10 FL (ref 7.5–11.1)
POTASSIUM SERPL-SCNC: 4.3 MMOL/L (ref 3.5–5.1)
RBC # BLD: 3.34 M/CU MM (ref 4.2–5.4)
RHINOVIRUS ENTEROVIRUS PCR: NOT DETECTED
RSV PCR: NOT DETECTED
SARS-COV-2: NOT DETECTED
SEGMENTED NEUTROPHILS ABSOLUTE COUNT: 9.1 K/CU MM
SEGMENTED NEUTROPHILS RELATIVE PERCENT: 77.1 % (ref 36–66)
SODIUM BLD-SCNC: 139 MMOL/L (ref 135–145)
TOTAL IMMATURE NEUTOROPHIL: 0.11 K/CU MM
TOTAL NUCLEATED RBC: 0 K/CU MM
TROPONIN T: 0.19 NG/ML
WBC # BLD: 11.8 K/CU MM (ref 4–10.5)

## 2021-06-18 PROCEDURE — C1751 CATH, INF, PER/CENT/MIDLINE: HCPCS

## 2021-06-18 PROCEDURE — 84484 ASSAY OF TROPONIN QUANT: CPT

## 2021-06-18 PROCEDURE — 83735 ASSAY OF MAGNESIUM: CPT

## 2021-06-18 PROCEDURE — 94640 AIRWAY INHALATION TREATMENT: CPT

## 2021-06-18 PROCEDURE — 36410 VNPNXR 3YR/> PHY/QHP DX/THER: CPT

## 2021-06-18 PROCEDURE — 0202U NFCT DS 22 TRGT SARS-COV-2: CPT

## 2021-06-18 PROCEDURE — 36415 COLL VENOUS BLD VENIPUNCTURE: CPT

## 2021-06-18 PROCEDURE — 6370000000 HC RX 637 (ALT 250 FOR IP): Performed by: INTERNAL MEDICINE

## 2021-06-18 PROCEDURE — 76937 US GUIDE VASCULAR ACCESS: CPT

## 2021-06-18 PROCEDURE — 94660 CPAP INITIATION&MGMT: CPT

## 2021-06-18 PROCEDURE — 6360000002 HC RX W HCPCS: Performed by: INTERNAL MEDICINE

## 2021-06-18 PROCEDURE — 93010 ELECTROCARDIOGRAM REPORT: CPT | Performed by: INTERNAL MEDICINE

## 2021-06-18 PROCEDURE — 87899 AGENT NOS ASSAY W/OPTIC: CPT

## 2021-06-18 PROCEDURE — 2700000000 HC OXYGEN THERAPY PER DAY

## 2021-06-18 PROCEDURE — 87449 NOS EACH ORGANISM AG IA: CPT

## 2021-06-18 PROCEDURE — 80048 BASIC METABOLIC PNL TOTAL CA: CPT

## 2021-06-18 PROCEDURE — APPSS60 APP SPLIT SHARED TIME 46-60 MINUTES: Performed by: NURSE PRACTITIONER

## 2021-06-18 PROCEDURE — 05HD33Z INSERTION OF INFUSION DEVICE INTO RIGHT CEPHALIC VEIN, PERCUTANEOUS APPROACH: ICD-10-PCS | Performed by: HOSPITALIST

## 2021-06-18 PROCEDURE — 71250 CT THORAX DX C-: CPT

## 2021-06-18 PROCEDURE — 82962 GLUCOSE BLOOD TEST: CPT

## 2021-06-18 PROCEDURE — 6370000000 HC RX 637 (ALT 250 FOR IP): Performed by: HOSPITALIST

## 2021-06-18 PROCEDURE — 94761 N-INVAS EAR/PLS OXIMETRY MLT: CPT

## 2021-06-18 PROCEDURE — 2580000003 HC RX 258: Performed by: INTERNAL MEDICINE

## 2021-06-18 PROCEDURE — 2500000003 HC RX 250 WO HCPCS: Performed by: HOSPITALIST

## 2021-06-18 PROCEDURE — 85025 COMPLETE CBC W/AUTO DIFF WBC: CPT

## 2021-06-18 PROCEDURE — 2060000000 HC ICU INTERMEDIATE R&B

## 2021-06-18 PROCEDURE — 71045 X-RAY EXAM CHEST 1 VIEW: CPT

## 2021-06-18 PROCEDURE — 6360000002 HC RX W HCPCS: Performed by: NURSE PRACTITIONER

## 2021-06-18 PROCEDURE — 6370000000 HC RX 637 (ALT 250 FOR IP)

## 2021-06-18 PROCEDURE — 93005 ELECTROCARDIOGRAM TRACING: CPT | Performed by: HOSPITALIST

## 2021-06-18 PROCEDURE — 99233 SBSQ HOSP IP/OBS HIGH 50: CPT | Performed by: INTERNAL MEDICINE

## 2021-06-18 RX ORDER — NITROGLYCERIN 0.4 MG/1
TABLET SUBLINGUAL
Status: COMPLETED
Start: 2021-06-18 | End: 2021-06-18

## 2021-06-18 RX ORDER — NICOTINE POLACRILEX 4 MG
15 LOZENGE BUCCAL PRN
Status: DISCONTINUED | OUTPATIENT
Start: 2021-06-18 | End: 2021-06-20 | Stop reason: HOSPADM

## 2021-06-18 RX ORDER — DEXTROSE MONOHYDRATE 50 MG/ML
100 INJECTION, SOLUTION INTRAVENOUS PRN
Status: DISCONTINUED | OUTPATIENT
Start: 2021-06-18 | End: 2021-06-20 | Stop reason: HOSPADM

## 2021-06-18 RX ORDER — DEXTROSE MONOHYDRATE 25 G/50ML
12.5 INJECTION, SOLUTION INTRAVENOUS PRN
Status: DISCONTINUED | OUTPATIENT
Start: 2021-06-18 | End: 2021-06-20 | Stop reason: HOSPADM

## 2021-06-18 RX ORDER — NITROGLYCERIN 0.4 MG/1
0.4 TABLET SUBLINGUAL EVERY 5 MIN PRN
Status: DISCONTINUED | OUTPATIENT
Start: 2021-06-18 | End: 2021-06-20 | Stop reason: HOSPADM

## 2021-06-18 RX ORDER — GUAIFENESIN 600 MG/1
600 TABLET, EXTENDED RELEASE ORAL 2 TIMES DAILY
Status: DISCONTINUED | OUTPATIENT
Start: 2021-06-18 | End: 2021-06-20 | Stop reason: HOSPADM

## 2021-06-18 RX ORDER — MAGNESIUM SULFATE IN WATER 40 MG/ML
2000 INJECTION, SOLUTION INTRAVENOUS ONCE
Status: COMPLETED | OUTPATIENT
Start: 2021-06-18 | End: 2021-06-18

## 2021-06-18 RX ORDER — DIGOXIN 0.25 MG/ML
500 INJECTION INTRAMUSCULAR; INTRAVENOUS ONCE
Status: COMPLETED | OUTPATIENT
Start: 2021-06-18 | End: 2021-06-18

## 2021-06-18 RX ADMIN — SODIUM CHLORIDE, PRESERVATIVE FREE 10 ML: 5 INJECTION INTRAVENOUS at 09:36

## 2021-06-18 RX ADMIN — LEVOTHYROXINE SODIUM 25 MCG: 25 TABLET ORAL at 05:49

## 2021-06-18 RX ADMIN — BUDESONIDE AND FORMOTEROL FUMARATE DIHYDRATE 2 PUFF: 160; 4.5 AEROSOL RESPIRATORY (INHALATION) at 20:24

## 2021-06-18 RX ADMIN — METHYLPREDNISOLONE SODIUM SUCCINATE 40 MG: 40 INJECTION, POWDER, FOR SOLUTION INTRAMUSCULAR; INTRAVENOUS at 05:49

## 2021-06-18 RX ADMIN — APIXABAN 5 MG: 5 TABLET, FILM COATED ORAL at 09:35

## 2021-06-18 RX ADMIN — CARVEDILOL 12.5 MG: 12.5 TABLET, FILM COATED ORAL at 09:45

## 2021-06-18 RX ADMIN — DIGOXIN 500 MCG: 0.25 INJECTION INTRAMUSCULAR; INTRAVENOUS at 21:31

## 2021-06-18 RX ADMIN — DEXTROSE MONOHYDRATE 10 MG/HR: 50 INJECTION, SOLUTION INTRAVENOUS at 21:32

## 2021-06-18 RX ADMIN — CARVEDILOL 12.5 MG: 12.5 TABLET, FILM COATED ORAL at 17:52

## 2021-06-18 RX ADMIN — GUAIFENESIN 600 MG: 600 TABLET, EXTENDED RELEASE ORAL at 21:32

## 2021-06-18 RX ADMIN — ASPIRIN 81 MG CHEWABLE TABLET 81 MG: 81 TABLET CHEWABLE at 09:35

## 2021-06-18 RX ADMIN — BUDESONIDE AND FORMOTEROL FUMARATE DIHYDRATE 2 PUFF: 160; 4.5 AEROSOL RESPIRATORY (INHALATION) at 08:09

## 2021-06-18 RX ADMIN — IPRATROPIUM BROMIDE AND ALBUTEROL SULFATE 1 AMPULE: .5; 3 SOLUTION RESPIRATORY (INHALATION) at 12:53

## 2021-06-18 RX ADMIN — IPRATROPIUM BROMIDE AND ALBUTEROL SULFATE 1 AMPULE: .5; 3 SOLUTION RESPIRATORY (INHALATION) at 15:55

## 2021-06-18 RX ADMIN — NITROGLYCERIN 0.4 MG: 0.4 TABLET SUBLINGUAL at 17:42

## 2021-06-18 RX ADMIN — PANTOPRAZOLE SODIUM 40 MG: 40 TABLET, DELAYED RELEASE ORAL at 05:49

## 2021-06-18 RX ADMIN — FUROSEMIDE 40 MG: 10 INJECTION, SOLUTION INTRAVENOUS at 09:35

## 2021-06-18 RX ADMIN — IPRATROPIUM BROMIDE AND ALBUTEROL SULFATE 1 AMPULE: .5; 3 SOLUTION RESPIRATORY (INHALATION) at 08:07

## 2021-06-18 RX ADMIN — APIXABAN 5 MG: 5 TABLET, FILM COATED ORAL at 21:32

## 2021-06-18 RX ADMIN — IPRATROPIUM BROMIDE AND ALBUTEROL SULFATE 1 AMPULE: .5; 3 SOLUTION RESPIRATORY (INHALATION) at 20:24

## 2021-06-18 RX ADMIN — NITROGLYCERIN 0.4 MG: 0.4 TABLET SUBLINGUAL at 09:02

## 2021-06-18 RX ADMIN — SODIUM CHLORIDE, PRESERVATIVE FREE 10 ML: 5 INJECTION INTRAVENOUS at 21:32

## 2021-06-18 RX ADMIN — MAGNESIUM SULFATE HEPTAHYDRATE 2000 MG: 2 INJECTION, SOLUTION INTRAVENOUS at 14:09

## 2021-06-18 RX ADMIN — FUROSEMIDE 40 MG: 10 INJECTION, SOLUTION INTRAVENOUS at 17:52

## 2021-06-18 RX ADMIN — DEXTROSE MONOHYDRATE 5 MG/HR: 50 INJECTION, SOLUTION INTRAVENOUS at 09:27

## 2021-06-18 RX ADMIN — METHYLPREDNISOLONE SODIUM SUCCINATE 40 MG: 40 INJECTION, POWDER, FOR SOLUTION INTRAMUSCULAR; INTRAVENOUS at 14:09

## 2021-06-18 RX ADMIN — METHYLPREDNISOLONE SODIUM SUCCINATE 40 MG: 40 INJECTION, POWDER, FOR SOLUTION INTRAMUSCULAR; INTRAVENOUS at 21:32

## 2021-06-18 RX ADMIN — ATORVASTATIN CALCIUM 10 MG: 10 TABLET, FILM COATED ORAL at 09:35

## 2021-06-18 ASSESSMENT — PAIN DESCRIPTION - FREQUENCY: FREQUENCY: INTERMITTENT

## 2021-06-18 ASSESSMENT — PAIN SCALES - GENERAL
PAINLEVEL_OUTOF10: 0
PAINLEVEL_OUTOF10: 0
PAINLEVEL_OUTOF10: 1
PAINLEVEL_OUTOF10: 3

## 2021-06-18 ASSESSMENT — PAIN DESCRIPTION - PROGRESSION: CLINICAL_PROGRESSION: GRADUALLY IMPROVING

## 2021-06-18 ASSESSMENT — PAIN DESCRIPTION - PAIN TYPE: TYPE: ACUTE PAIN

## 2021-06-18 ASSESSMENT — PAIN DESCRIPTION - ONSET: ONSET: SUDDEN

## 2021-06-18 ASSESSMENT — PAIN DESCRIPTION - DIRECTION: RADIATING_TOWARDS: LEFT SIDE

## 2021-06-18 ASSESSMENT — PAIN DESCRIPTION - ORIENTATION: ORIENTATION: MID;LEFT

## 2021-06-18 ASSESSMENT — ENCOUNTER SYMPTOMS: SHORTNESS OF BREATH: 1

## 2021-06-18 ASSESSMENT — PAIN DESCRIPTION - LOCATION: LOCATION: CHEST

## 2021-06-18 ASSESSMENT — PAIN - FUNCTIONAL ASSESSMENT: PAIN_FUNCTIONAL_ASSESSMENT: ACTIVITIES ARE NOT PREVENTED

## 2021-06-18 ASSESSMENT — PAIN DESCRIPTION - DESCRIPTORS: DESCRIPTORS: PRESSURE

## 2021-06-18 NOTE — PROGRESS NOTES
Hospitalist Progress Note      Name:  Fany Baig /Age/Sex: 1933  (80 y.o. female)   MRN & CSN:  [id number] & [id number] Admission Date/Time: 2021  7:12 PM   Location:  -A PCP: Thomas Jacobson MD       Hospital Day: 3      Assessment and Plan:       #. Acute on chronic respiratory failure with hypoxia and hypercapnia. -As per information patient was saturating 60% EMS  -Patient was placed on BiPAP in ER  -VBG-pH 7.30, PCO2 63, PO2 83, HCO3 31 upon arrival  -Rapid Covid negative. She is fully vaccinated  -She was initially started on BiPAP, now on nasal cannula. #.  Acute on chronic diastolic congestive heart failure:  -proBNP 6760 upon admission  -Bilateral lower extremity edema noted upon arrival  -Patient is compliant with diuretics as per daughter.  -Continue IV Lasix 40 mg twice daily  -2D echo-EF 50%-2021.  -Cardiology consult appreciated    #. Acute bronchospasm. She apparently has COPD. -Continue IV methylprednisolone  -She is wheezing tightly  -We will check a respiratory viral panel on   -CT chest on  shows a rapid clearing of parenchymal opacities compared to 2 days ago most consistent with resolving pulmonary edema. There is mild residual basilar pleural-parenchymal disease  -She had 1 dose Levaquin in ED  -On  we will recheck procalcitonin in the morning. We will check a CRP level as well. #.  Atrial fibrillation with rapid ventricular response  -On  she had chest pain in the morning with A. fib with RVR. Heart rate went up to 150  -She was started on Cardizem drip  -On  about 3:30 PM she also had increasing heart rate up to 170 on Cardizem drip 5 mg/h. I communicated with Dr. Daniela Crawford, we will increase the rate of the Cardizem drip, and give her digoxin 0.5 mg IV x1. I informed the nurse of the plan.     #.  Chest pain with elevated troponin  -On  she had chest pain when she had rapid atrial fibrillation  -Troponin is elevated. We are trending up.  -I sent the troponin results to cardiology  -Last admission she declined to have a cardiac cath. -She is at home with hospice. #.  Mild hypervolemic hyponatremia  -Diurese and monitor     #. Elevated lactic acid:  -Does not appear to be having any signs and symptoms of infection.  -Chest x-ray-bilateral pulmonary opacities which are significantly worse  -Patient received levofloxacin, Zosyn in ER.  -Procalcitonin not elevated. -Patient was recently treated with antibiotics a month ago. -Patient denied any fever, chills, has chronic cough  -Hold off antibiotics. #. coronary artery disease s/p CABG (7/15)  -Continue aspirin, Eliquis, atorvastatin, carvedilol     #. chronic diastolic congestive heart failure  -Currently in exacerbation  -Patient is on 20 mg of Lasix daily     #. paroxysmal atrial fibrillation, on anticoagulation  -Continue carvedilol, Eliquis     #. CVA 4 years ago post cardiac cath (2016)  -As per daughter at bedside patient is mostly bedbound, requires maximum assistance with ADLs, can feed herself     #. ascending aortic aneurysm and moderate aortic insufficiency     #. COPD, chronic respiratory failure on home oxygen at 2 L/min  -Treating as acute exacerbation  -Continue DuoNeb every 4 hours while awake, IV methylprednisolone     #. Hypertension-continue carvedilol     #. Hyperlipidemia-continue atorvastatin     #. GERD-Protonix     #. Hypothyroidism-levothyroxine     #. moderate obesity-with the BMI 34.34     #. recent admission to the hospital 5/13-5/16/21     #. Patient on hospice care as per her daughter.  -As per outpatient pharmacy patient was prescribed liquid morphine, lorazepam, hyoscyamine, Haldol. DVT Prophylaxis: Eliquis  GI Prophylaxis: Protonix  Code Status: Limited-DNR/DNI, no defibrillation. The admitting hospitalist discussed with patient's daughter at bedside who is her POA.       Subjective:     June 18: She had chest pain with rapid A. fib as documented above. She was wheezing slightly on exam today. June 17:    Spoke with dad daughter at the bedside today. She had just gotten back from the cafeteria and gotten her mom something to eat in the morning. Daughter stated that they have 24-hour caregivers at home. Spoke with . The caregivers are paid for his privately. Patient was on hospice at home and revoked upon admission. The plan is to reenroll with hospice upon discharge. She needs a new hospice order upon discharge. She states that she was breathing better today. But she still had fine crackles in her lung long term up. She was also wheezing on exam.    She is with [facility] hospice      PCT was negative yesterday      Chief Complaint   Patient presents with    Respiratory Distress       Per ED triage note:       Pt S&E. Pt presents to ED from home per EMS for respiratory distress. When EMS arrived pt was on 2L NC with O2 sat at 79%. Pt arrived on 4L with albuterul breathing tx applied. Pt has labored RR and is 89% O2 saturation. Rowdy Aceves is at bedside. Respiratory is at bedside              May 13 to May 16 admission noted, discharge summary below:     Discharge Diagnoses and Hospital Course:   Noah Mcgee is a 80 y.o.  female  who presents with SOB     Acute hypoxic and hypercapnic respiratory failure possibly d/t Community-acquired pneumonia versus fluid overload, versus flash pulmonary edema  Possible PNA  NSTEMI  Also has lower extremity edema and crackles, possibly fluid overload, however responded to BiPAP  CXR with bilateral infiltrates, consistent with pulmonary edema  Ceftriaxone and azithromycin>>Continue Cefdinir at discharge  Respiratory viral panel negative  Pulmonary recommendations appreciated  Cardiology recommendations appreciated-Plan for heart cath as troponin's trending up, family debating at this time. Patient refused  heart cath.   Continue Aspirin  Coreg dose adjusted  Taper prednisone  Echocardiogram with EF 50% with Grade I DD     Lactic acidosis-Resolved  Likely with Hypoxia     Acquired hypothyroidism  Continue Synthroid     Paroxysmal atrial fibrillation  Continue Eliquis and Beta blocker     Coronary artery disease involving coronary bypass graft of native heart without angina pectoris  Aspirin, Lipitor     Chronic obstructive pulmonary disease, likely not in acute exacerbation      Prior cardiac cath as below:    Coronary anatomy:   The left main coronary artery has no significant disease. Left anterior descending artery has 60% mid stenosis. D1 has 99% mid stenosis     Circumflex artery has 90% instent stenosis going into a medium size OM. A large PL occluded filling from collaterls. .     The right coronary artery is a dominant vessel with  Patent stents. Left ventriculogram shows ejection fraction of 55%. Normal wall motion. LIMA patent     Impression:   SEVERE NATIVE CAD  DILATED AORTA  PAF          Patient Active Problem List   Diagnosis Code    CAD (coronary artery disease) I25.10    History of PTCA Z98.61    Hyperlipidemia E78.5    Hypertension I10    Obesity E66.9    VHD (valvular heart disease) I39    H/O cardiovascular stress test Z92.89    Chest pain R07.9    Paroxysmal atrial fibrillation (HCC) I48.0    UTI (urinary tract infection) N39.0      Recommendations:  CABG and MAZE  Findings are reviewed and discussed  with patient and family. Questions are answered. Post procedure activity restrictions and continued risk modification and follow up recommended. Shruthi Finn MD, 1/17/2016 8:31 AM       Objective:        Intake/Output Summary (Last 24 hours) at 6/18/2021 1003  Last data filed at 6/18/2021 0936  Gross per 24 hour   Intake 20 ml   Output 1850 ml   Net -1830 ml      Vitals:   Vitals:    06/18/21 0945   BP: 131/61   Pulse: 98   Resp:    Temp:    SpO2:          Labs:   CBC:   Lab Results

## 2021-06-18 NOTE — PROGRESS NOTES
Pt c/o chest pain. 3/10. Never had chest pain before. Dr. Logan Flores came into room at this time as well as Shek[facility]. When asked pain level by Dr. Moon Herrera she states 6/10. Pt given nitro at 0902 and states it worked. No more given. Orders placed for EKG and troponin. Will start pt on cardizem gtt and give mag to replace mag level of 1.5. Pt alert and oriented. States she is starting to feel better but is shaky. Consult placed to IV therapy for a second line. Pt doing well at this time.

## 2021-06-18 NOTE — RT PROTOCOL NOTE
wheezing or increased work of breathing using Per Protocol order mode. Repeat RT Therapy Protocol Assessment with second treatment then BID and as needed. If Albuterol Inhaler not tolerated or not effective, then discontinue the Albuterol Inhaler orders and enter two Albuterol Nebulizer orders with same frequencies and PRN reasons. 11-13 - discontinue any other Inpatient aerosolized bronchodilator medication orders and enter DuoNeb Nebulizer orders QID frequency and an Albuterol Nebulizer order every 2 hours PRN wheezing or increased work of breathing using Per Protocol order mode. Repeat RT Therapy Protocol Assessment with second treatment then QID and as needed. Greater than 13 - discontinue any other Inpatient bronchodilator aerosolized medication orders and enter DuoNeb Nebulizer order every 4 hours frequency and Albuterol Nebulizer every 2 hours PRN wheezing or increased work of breathing using Per Protocol order mode. Repeat RT Therapy Protocol Assessment with second treatment then every 4 hours and as needed. RT to enter RT Home Evaluation for COPD & MDI Assessment order using Per Protocol order mode.     Electronically signed by Mel Palmer RCP on 6/18/2021 at 1:01 PM

## 2021-06-18 NOTE — CONSULTS
IV Consult complete. Arrow Endurance SL midline inserted in RUE cephalic vessel x1 attempt using sterile ultrasound guided technique. Brisk blood return flushes easy.

## 2021-06-18 NOTE — PROGRESS NOTES
Cardiology Progress Note     Today's Plan: Start Cardizem replace magnesium    Admit Date:  6/16/2021    Consult reason/ Seen today for: Shortness of breath    Subjective and  Overnight Events: Patient had sudden onset of chest pain, rapid rate 150s. EKG shows SVT but when heart rate lowered patient continued in atrial fibrillation. Chest pain decreased after nitroglycerin and Cardizem. Assessment / Plan / Recommendation:     1. Acute hypoxic/hypercapnic respiratory failure likely secondary to ? Pneumonia with elevated lactic acid    2. Congestive heart failure, diastolic.    Mild acute on chronic. Continue IV Lasix, chest x-ray improved. Net I/O -1.6 L continue with Coreg. 3. History of coronary artery disease s/p CABG. Refused left heart cath last admission. Conservative management. Continue with aspirin beta-blocker and statins  4. Paroxysmal atrial fibrillation. Patient in RVR this a.m. associated chest pain mild relief with nitro. Patient started on Cardizem drip. Hypomagnesia of 1.5 this am could be cause, recommend replacement. Recommend to keep potassium between 4-4.5 and magnesium between 2 and 2.2. Continue with beta-blocker/Eliquis  5. Moderate aortic insufficiency: Continue with diuretics.  IV Lasix 40 twice daily. 6. Hyperlipidemia: Continue with Lipitor  7. History of stroke: Continue aspirin statin  8. Recommend sleep study to rule out sleep apnea. 9. DVT prophylaxis if not contraindicated. History of Presenting Illness:    Chief complain on admission : 80 y. o.year old who is admitted for  Chief Complaint   Patient presents with    Respiratory Distress        Past medical history:    has a past medical history of A-fib ([facility] [address] Bronchospasm, CHF (congestive heart failure) ([facility] [address] COPD (chronic obstructive pulmonary disease) ([facility] [address] Essential hypertension, Family history of coronary artery disease, GERD (gastroesophageal reflux disease), H/O cardiovascular stress test, H/O echocardiogram, Headache, History of PTCA, Hx of cardiovascular stress test, Hx of echocardiogram, Hyperlipidemia, Obesity, Paroxysmal atrial fibrillation (HCC), Postmenopausal state, Restrictive lung disease, and VHD (valvular heart disease). Past surgical history:   has a past surgical history that includes Cholecystectomy; Coronary angioplasty (2005, 1995); Thoracentesis (Left, 08/15/2016); Cataract removal with implant (Bilateral, 2006); and Coronary artery bypass graft (07/2016). Social History:   reports that she has never smoked. She has never used smokeless tobacco. She reports that she does not drink alcohol and does not use drugs. Family history:  family history includes Colon Cancer in an other family member; Heart Disease in her father, sister, and sister; Heart Failure in her mother; Heart Surgery in her sister; Rheum Arthritis in her sister. Allergies   Allergen Reactions    Sulfa Antibiotics Other (See Comments)     Cannot remember; rash per paper chart    Zoloft [Sertraline Hcl] Other (See Comments)     Shakes          Review of Systems:  Review of Systems   Respiratory: Positive for shortness of breath. Cardiovascular: Positive for chest pain. Negative for palpitations and leg swelling. Musculoskeletal: Negative. Skin: Negative. Neurological: Negative for dizziness and weakness. All other systems reviewed and are negative.        /61   Pulse 98   Temp 98 °F (36.7 °C) (Oral)   Resp 26   Ht 4' 11\" (1.499 m)   Wt 169 lb 8.5 oz (76.9 kg)   SpO2 97%   BMI 34.24 kg/m²       Intake/Output Summary (Last 24 hours) at 6/18/2021 1045  Last data filed at 6/18/2021 0936  Gross per 24 hour   Intake 20 ml   Output 1850 ml   Net -1830 ml       Physical Exam:  Constitutional:  Well developed, Well nourished, No acute distress  HENT:  Normocephalic, Atraumatic, Bilateral external ears normal,  Nose normal. Neck- trachea is midline  Eyes:  PERRL, Conjunctiva normal  Respiratory:  Normal breath sounds, No respiratory distress, No wheezing, No chest tenderness. Cardiovascular:  Normal heart rate, Normal rhythm, no murmurs appreciated, No rubs appreciated, No gallops appreciated, JVP not elevated  Abdomen/GI:  Soft, No tenderness  Musculoskeletal:  Intact distal pulses, no edema, No tenderness, No cyanosis, No clubbing. Integument:  Warm, Dry  Lymphatic:  No lymphadenopathy noted. Neurologic:  Alert & oriented  Psychiatric:  Affect and Mood :pleasant     Medications:    magnesium sulfate  2,000 mg Intravenous Once    sodium chloride flush  5-40 mL Intravenous 2 times per day    furosemide  40 mg Intravenous BID    apixaban  5 mg Oral BID    aspirin  81 mg Oral Daily    atorvastatin  10 mg Oral Daily    carvedilol  12.5 mg Oral BID WC    budesonide-formoterol  2 puff Inhalation BID    levothyroxine  25 mcg Oral Daily    methylPREDNISolone  40 mg Intravenous Q8H    pantoprazole  40 mg Oral QAM AC    piperacillin-tazobactam  3,375 mg Intravenous Once    ipratropium-albuterol  1 ampule Inhalation Q4H WA      dilTIAZem (CARDIZEM) 100 mg in dextrose 5% 100 mL ([facility]) 5 mg/hr (06/18/21 0927)    sodium chloride       nitroGLYCERIN, sodium chloride flush, sodium chloride, polyethylene glycol, acetaminophen **OR** acetaminophen, ondansetron    Lab Data:  CBC:   Recent Labs     06/16/21 1930 06/18/21 0617   WBC 8.3 11.8*   HGB 12.1* 10.4*   HCT 40.1 33.1*   .0* 99.1    264     BMP:   Recent Labs     06/16/21 1930 06/18/21  0617   * 139   K 4.6 4.3   CL 93* 96*   CO2 28 35*   BUN 17 31*   CREATININE 0.9 1.2*     PT/INR: No results for input(s): PROTIME, INR in the last 72 hours.   BNP:    Recent Labs     06/16/21 1930   PROBNP 6,760*     TROPONIN:   Recent Labs     06/16/21 1930   TROPONINT 0.011*        ECHO : 5/14/21  Left ventricular systolic function is normal.   Ejection fraction is visually estimated at 50%. Moderate left ventricular hypertrophy. Grade I diastolic dysfunction. Dilated ascending aorta measuring 4.1cm. Moderate aortic regurgitation; PHT: 463 msec. No evidence of any pericardial effusion. Impression:  Active Problems:    Diastolic CHF, acute on chronic (HCC)  Resolved Problems:    * No resolved hospital problems. *       All labs, medications and tests reviewed by myself, continue all other medications of all above medical condition listed as is except for changes mentioned above. Thank you   Please call with questions. Electronically signed by Emani Lua. OSIRIS Hunter CNP on 6/18/2021 at 10:45 AM           CARDIOLOGY ATTENDING ADDENDUM    I have seen, spoken to and examined this patient personally, independently of the nurse practitioner. I have reviewed the hospital care given to date and reviewed all pertinent labs and imaging. The plan was developed mutually at the time of the visit with the patient,  NP   and myself. I have spoken with patient, nursing staff and provided written and verbal instructions . The above note has been reviewed and I agree with the assessment, diagnosis, and treatment plan with changes made by me as follows       HPI:  I have reviewed the above HPI  And agree with above   Please review addendum/changes made to note above     Interval history:      Overnight patient had A. fib RVR    Physical Exam:  General:  Awake, alert, NAD  Head:normal  Eye:normal  Neck:  No JVD   Chest:  Clear to auscultation, respiration easy  Cardiovascular:  s1s2  Abdomen:   nontender  Extremities:  +1 edema  Pulses; palpable  Neuro: grossly normal      MEDICAL DECISION MAKING;    I agree with the above plan, which was planned by myself and discussed with NP. Mild acute on chronic congestive heart failure. Continue with IV Lasix  History of coronary disease s/p CABG declining any further ischemic work-up.   Chronic A. fib, overnight A. fib RVR with associated chest pain relieved with nitroglycerin. Currently on IV Cardizem drip, continue for rate control. Continue with beta-blocker and Eliquis. We will continue to follow.       Dr. Dario Lee MD

## 2021-06-18 NOTE — FLOWSHEET NOTE
Initial visit to assess SC needs. Pt and daughter together. Pt is coping well but asked me to pray for better health, and \"a new body. \" Offered prayer and encouragement.  Pt grateful

## 2021-06-18 NOTE — PROGRESS NOTES
Pt with Chest pain and Afib 100-150s and had relief with NTG. EKD with SVT/Afib and trop and CXR ordered. Cardiazem drip ordered for better rate control.  Mag low and correct

## 2021-06-18 NOTE — PROGRESS NOTES
Pt c/o chest pain 3/10. HR jumping between 150-170's. Pt given nitro at this time and increased cardizem gtt to 7.5mg.  Perfect serve sent to Dr. Pierre Dawson.

## 2021-06-19 LAB
ANION GAP SERPL CALCULATED.3IONS-SCNC: 11 MMOL/L (ref 4–16)
BASOPHILS ABSOLUTE: 0 K/CU MM
BASOPHILS RELATIVE PERCENT: 0 % (ref 0–1)
BUN BLDV-MCNC: 41 MG/DL (ref 6–23)
CALCIUM SERPL-MCNC: 9.3 MG/DL (ref 8.3–10.6)
CHLORIDE BLD-SCNC: 97 MMOL/L (ref 99–110)
CO2: 34 MMOL/L (ref 21–32)
CREAT SERPL-MCNC: 1.1 MG/DL (ref 0.6–1.1)
DIFFERENTIAL TYPE: ABNORMAL
EKG ATRIAL RATE: 75 BPM
EKG DIAGNOSIS: NORMAL
EKG Q-T INTERVAL: 430 MS
EKG QRS DURATION: 90 MS
EKG QTC CALCULATION (BAZETT): 447 MS
EKG R AXIS: -37 DEGREES
EKG T AXIS: 232 DEGREES
EKG VENTRICULAR RATE: 65 BPM
EOSINOPHILS ABSOLUTE: 0 K/CU MM
EOSINOPHILS RELATIVE PERCENT: 0 % (ref 0–3)
ESTIMATED AVERAGE GLUCOSE: 128 MG/DL
GFR AFRICAN AMERICAN: 57 ML/MIN/1.73M2
GFR NON-AFRICAN AMERICAN: 47 ML/MIN/1.73M2
GLUCOSE BLD-MCNC: 112 MG/DL (ref 70–99)
GLUCOSE BLD-MCNC: 115 MG/DL (ref 70–99)
GLUCOSE BLD-MCNC: 130 MG/DL (ref 70–99)
GLUCOSE BLD-MCNC: 131 MG/DL (ref 70–99)
GLUCOSE BLD-MCNC: 145 MG/DL (ref 70–99)
HBA1C MFR BLD: 6.1 % (ref 4.2–6.3)
HCT VFR BLD CALC: 33.7 % (ref 37–47)
HEMOGLOBIN: 10.5 GM/DL (ref 12.5–16)
HIGH SENSITIVE C-REACTIVE PROTEIN: 11.1 MG/L
IMMATURE NEUTROPHIL %: 1 % (ref 0–0.43)
LEGIONELLA URINARY AG: NEGATIVE
LYMPHOCYTES ABSOLUTE: 1.2 K/CU MM
LYMPHOCYTES RELATIVE PERCENT: 11.5 % (ref 24–44)
MAGNESIUM: 2.2 MG/DL (ref 1.8–2.4)
MCH RBC QN AUTO: 30.4 PG (ref 27–31)
MCHC RBC AUTO-ENTMCNC: 31.2 % (ref 32–36)
MCV RBC AUTO: 97.7 FL (ref 78–100)
MONOCYTES ABSOLUTE: 0.4 K/CU MM
MONOCYTES RELATIVE PERCENT: 4.3 % (ref 0–4)
NUCLEATED RBC %: 0 %
PDW BLD-RTO: 13.2 % (ref 11.7–14.9)
PLATELET # BLD: 268 K/CU MM (ref 140–440)
PMV BLD AUTO: 9.9 FL (ref 7.5–11.1)
POTASSIUM SERPL-SCNC: 4.4 MMOL/L (ref 3.5–5.1)
PRO-BNP: ABNORMAL PG/ML
PROCALCITONIN: 0.21
RBC # BLD: 3.45 M/CU MM (ref 4.2–5.4)
SEGMENTED NEUTROPHILS ABSOLUTE COUNT: 8.5 K/CU MM
SEGMENTED NEUTROPHILS RELATIVE PERCENT: 83.2 % (ref 36–66)
SODIUM BLD-SCNC: 142 MMOL/L (ref 135–145)
STREP PNEUMONIAE ANTIGEN: NORMAL
TOTAL IMMATURE NEUTOROPHIL: 0.1 K/CU MM
TOTAL NUCLEATED RBC: 0 K/CU MM
TROPONIN T: 0.23 NG/ML
TROPONIN T: 0.26 NG/ML
TROPONIN T: 0.31 NG/ML
WBC # BLD: 10.2 K/CU MM (ref 4–10.5)

## 2021-06-19 PROCEDURE — 83735 ASSAY OF MAGNESIUM: CPT

## 2021-06-19 PROCEDURE — 99233 SBSQ HOSP IP/OBS HIGH 50: CPT | Performed by: INTERNAL MEDICINE

## 2021-06-19 PROCEDURE — 6370000000 HC RX 637 (ALT 250 FOR IP): Performed by: INTERNAL MEDICINE

## 2021-06-19 PROCEDURE — 2580000003 HC RX 258: Performed by: INTERNAL MEDICINE

## 2021-06-19 PROCEDURE — 36415 COLL VENOUS BLD VENIPUNCTURE: CPT

## 2021-06-19 PROCEDURE — 84145 PROCALCITONIN (PCT): CPT

## 2021-06-19 PROCEDURE — 6370000000 HC RX 637 (ALT 250 FOR IP): Performed by: NURSE PRACTITIONER

## 2021-06-19 PROCEDURE — 94660 CPAP INITIATION&MGMT: CPT

## 2021-06-19 PROCEDURE — 94761 N-INVAS EAR/PLS OXIMETRY MLT: CPT

## 2021-06-19 PROCEDURE — 93010 ELECTROCARDIOGRAM REPORT: CPT | Performed by: INTERNAL MEDICINE

## 2021-06-19 PROCEDURE — APPSS45 APP SPLIT SHARED TIME 31-45 MINUTES: Performed by: NURSE PRACTITIONER

## 2021-06-19 PROCEDURE — 2700000000 HC OXYGEN THERAPY PER DAY

## 2021-06-19 PROCEDURE — 86141 C-REACTIVE PROTEIN HS: CPT

## 2021-06-19 PROCEDURE — 83036 HEMOGLOBIN GLYCOSYLATED A1C: CPT

## 2021-06-19 PROCEDURE — 85025 COMPLETE CBC W/AUTO DIFF WBC: CPT

## 2021-06-19 PROCEDURE — 6360000002 HC RX W HCPCS: Performed by: INTERNAL MEDICINE

## 2021-06-19 PROCEDURE — 84484 ASSAY OF TROPONIN QUANT: CPT

## 2021-06-19 PROCEDURE — 94640 AIRWAY INHALATION TREATMENT: CPT

## 2021-06-19 PROCEDURE — 82962 GLUCOSE BLOOD TEST: CPT

## 2021-06-19 PROCEDURE — 83880 ASSAY OF NATRIURETIC PEPTIDE: CPT

## 2021-06-19 PROCEDURE — 80048 BASIC METABOLIC PNL TOTAL CA: CPT

## 2021-06-19 PROCEDURE — 93005 ELECTROCARDIOGRAM TRACING: CPT | Performed by: INTERNAL MEDICINE

## 2021-06-19 PROCEDURE — 2060000000 HC ICU INTERMEDIATE R&B

## 2021-06-19 RX ORDER — CARVEDILOL 12.5 MG/1
12.5 TABLET ORAL 2 TIMES DAILY WITH MEALS
Status: DISCONTINUED | OUTPATIENT
Start: 2021-06-19 | End: 2021-06-20

## 2021-06-19 RX ORDER — DILTIAZEM HYDROCHLORIDE 120 MG/1
120 CAPSULE, COATED, EXTENDED RELEASE ORAL DAILY
Status: DISCONTINUED | OUTPATIENT
Start: 2021-06-19 | End: 2021-06-20 | Stop reason: HOSPADM

## 2021-06-19 RX ORDER — ISOSORBIDE MONONITRATE 30 MG/1
30 TABLET, EXTENDED RELEASE ORAL DAILY
Status: DISCONTINUED | OUTPATIENT
Start: 2021-06-19 | End: 2021-06-20 | Stop reason: HOSPADM

## 2021-06-19 RX ORDER — RANOLAZINE 500 MG/1
500 TABLET, EXTENDED RELEASE ORAL 2 TIMES DAILY
Status: DISCONTINUED | OUTPATIENT
Start: 2021-06-19 | End: 2021-06-20 | Stop reason: HOSPADM

## 2021-06-19 RX ADMIN — METHYLPREDNISOLONE SODIUM SUCCINATE 40 MG: 40 INJECTION, POWDER, FOR SOLUTION INTRAMUSCULAR; INTRAVENOUS at 21:33

## 2021-06-19 RX ADMIN — METHYLPREDNISOLONE SODIUM SUCCINATE 40 MG: 40 INJECTION, POWDER, FOR SOLUTION INTRAMUSCULAR; INTRAVENOUS at 07:04

## 2021-06-19 RX ADMIN — METHYLPREDNISOLONE SODIUM SUCCINATE 40 MG: 40 INJECTION, POWDER, FOR SOLUTION INTRAMUSCULAR; INTRAVENOUS at 13:32

## 2021-06-19 RX ADMIN — ASPIRIN 81 MG CHEWABLE TABLET 81 MG: 81 TABLET CHEWABLE at 08:17

## 2021-06-19 RX ADMIN — CEFTRIAXONE 1000 MG: 1 INJECTION, POWDER, FOR SOLUTION INTRAMUSCULAR; INTRAVENOUS at 23:17

## 2021-06-19 RX ADMIN — ISOSORBIDE MONONITRATE 30 MG: 30 TABLET, EXTENDED RELEASE ORAL at 08:17

## 2021-06-19 RX ADMIN — GUAIFENESIN 600 MG: 600 TABLET, EXTENDED RELEASE ORAL at 08:17

## 2021-06-19 RX ADMIN — RANOLAZINE 500 MG: 500 TABLET, EXTENDED RELEASE ORAL at 08:17

## 2021-06-19 RX ADMIN — CARVEDILOL 12.5 MG: 12.5 TABLET, FILM COATED ORAL at 08:17

## 2021-06-19 RX ADMIN — BUDESONIDE AND FORMOTEROL FUMARATE DIHYDRATE 2 PUFF: 160; 4.5 AEROSOL RESPIRATORY (INHALATION) at 07:27

## 2021-06-19 RX ADMIN — PANTOPRAZOLE SODIUM 40 MG: 40 TABLET, DELAYED RELEASE ORAL at 07:04

## 2021-06-19 RX ADMIN — RANOLAZINE 500 MG: 500 TABLET, EXTENDED RELEASE ORAL at 21:33

## 2021-06-19 RX ADMIN — APIXABAN 5 MG: 5 TABLET, FILM COATED ORAL at 08:17

## 2021-06-19 RX ADMIN — ATORVASTATIN CALCIUM 10 MG: 10 TABLET, FILM COATED ORAL at 08:17

## 2021-06-19 RX ADMIN — IPRATROPIUM BROMIDE AND ALBUTEROL SULFATE 1 AMPULE: .5; 3 SOLUTION RESPIRATORY (INHALATION) at 19:39

## 2021-06-19 RX ADMIN — IPRATROPIUM BROMIDE AND ALBUTEROL SULFATE 1 AMPULE: .5; 3 SOLUTION RESPIRATORY (INHALATION) at 07:26

## 2021-06-19 RX ADMIN — IPRATROPIUM BROMIDE AND ALBUTEROL SULFATE 1 AMPULE: .5; 3 SOLUTION RESPIRATORY (INHALATION) at 15:07

## 2021-06-19 RX ADMIN — FUROSEMIDE 40 MG: 10 INJECTION, SOLUTION INTRAVENOUS at 18:19

## 2021-06-19 RX ADMIN — BUDESONIDE AND FORMOTEROL FUMARATE DIHYDRATE 2 PUFF: 160; 4.5 AEROSOL RESPIRATORY (INHALATION) at 19:40

## 2021-06-19 RX ADMIN — LEVOTHYROXINE SODIUM 25 MCG: 25 TABLET ORAL at 07:04

## 2021-06-19 RX ADMIN — SODIUM CHLORIDE, PRESERVATIVE FREE 10 ML: 5 INJECTION INTRAVENOUS at 21:33

## 2021-06-19 RX ADMIN — POLYETHYLENE GLYCOL (3350) 17 G: 17 POWDER, FOR SOLUTION ORAL at 09:28

## 2021-06-19 RX ADMIN — APIXABAN 5 MG: 5 TABLET, FILM COATED ORAL at 21:33

## 2021-06-19 RX ADMIN — DILTIAZEM HYDROCHLORIDE 120 MG: 120 CAPSULE, COATED, EXTENDED RELEASE ORAL at 08:17

## 2021-06-19 RX ADMIN — AZITHROMYCIN 500 MG: 500 INJECTION, POWDER, LYOPHILIZED, FOR SOLUTION INTRAVENOUS at 23:17

## 2021-06-19 RX ADMIN — CARVEDILOL 12.5 MG: 12.5 TABLET, FILM COATED ORAL at 18:00

## 2021-06-19 RX ADMIN — FUROSEMIDE 40 MG: 10 INJECTION, SOLUTION INTRAVENOUS at 08:18

## 2021-06-19 RX ADMIN — SODIUM CHLORIDE, PRESERVATIVE FREE 10 ML: 5 INJECTION INTRAVENOUS at 08:18

## 2021-06-19 RX ADMIN — GUAIFENESIN 600 MG: 600 TABLET, EXTENDED RELEASE ORAL at 21:33

## 2021-06-19 ASSESSMENT — PAIN SCALES - GENERAL
PAINLEVEL_OUTOF10: 0

## 2021-06-19 ASSESSMENT — ENCOUNTER SYMPTOMS: SHORTNESS OF BREATH: 1

## 2021-06-19 NOTE — PROGRESS NOTES
Hospitalist Progress Note      Name:  Sarah Eldridge /Age/Sex: 1933  (80 y.o. female)   MRN & CSN:  [id number] & [id number] Admission Date/Time: 2021  7:12 PM   Location:  -A PCP: Junito Polk MD       Hospital Day: 4      Assessment and Plan:       #. Acute on chronic respiratory failure with hypoxia and hypercapnia. -As per information patient was saturating 60% EMS  -Patient was placed on BiPAP in ER  -VBG-pH 7.30, PCO2 63, PO2 83, HCO3 31 upon arrival  -Rapid Covid negative. She is fully vaccinated  -She was initially started on BiPAP, now on nasal cannula. #.  Acute on chronic diastolic congestive heart failure:  -proBNP 6760 upon admission  -Bilateral lower extremity edema noted upon arrival  -Patient is compliant with diuretics as per daughter.  -Continue Lasix 40 mg IV BID  -2D echo-EF 50%-2021.  -Cardiology consult appreciated    #. Acute bronchospasm. She apparently has COPD. -Continue IV methylprednisolone  -She is wheezing tightly  -We will check a respiratory viral panel on   -CT chest on  shows a rapid clearing of parenchymal opacities compared to 2 days ago most consistent with resolving pulmonary edema. There is mild residual basilar pleural-parenchymal disease  -She had 1 dose Levaquin in ED  -On  we will recheck procalcitonin in the morning. We will check a CRP level as well.  - PCT is 0.2 - will start Rocephin and Zithromax    #. Atrial fibrillation with rapid ventricular response  -On  she had chest pain in the morning with A. fib with RVR. Heart rate went up to 150  -She was started on Cardizem drip  -On  about 3:30 PM she also had increasing heart rate up to 170 on Cardizem drip 5 mg/h. I communicated with Dr. Naz Waterman, we will increase the rate of the Cardizem drip, and give her digoxin 0.5 mg IV x1. I informed the nurse of the plan.  - off Cardizem drip    #.   Chest pain with elevated admitting hospitalist discussed with patient's daughter at bedside who is her POA. Subjective:     June 19: She was breathing better today. She was still wheezing on exam.    June 18: She had chest pain with rapid A. fib as documented above. She was wheezing slightly on exam today. June 17:    Spoke with dad daughter at the bedside today. She had just gotten back from the cafeteria and gotten her mom something to eat in the morning. Daughter stated that they have 24-hour caregivers at home. Spoke with . The caregivers are paid for his privately. Patient was on hospice at home and revoked upon admission. The plan is to reenroll with hospice upon discharge. She needs a new hospice order upon discharge. She states that she was breathing better today. But she still had fine crackles in her lung assisted up. She was also wheezing on exam.    She is with [facility] hospice      PCT was negative yesterday      Chief Complaint   Patient presents with    Respiratory Distress       Per ED triage note:       Pt S&E. Pt presents to ED from home per EMS for respiratory distress. When EMS arrived pt was on 2L NC with O2 sat at 79%. Pt arrived on 4L with albuterul breathing tx applied. Pt has labored RR and is 89% O2 saturation. Santana Nesbitt is at bedside.  Respiratory is at bedside              May 13 to May 16 admission noted, discharge summary below:     Discharge Diagnoses and Hospital Course:   Serena Najjar is a 80 y.o.  female  who presents with SOB     Acute hypoxic and hypercapnic respiratory failure possibly d/t Community-acquired pneumonia versus fluid overload, versus flash pulmonary edema  Possible PNA  NSTEMI  Also has lower extremity edema and crackles, possibly fluid overload, however responded to BiPAP  CXR with bilateral infiltrates, consistent with pulmonary edema  Ceftriaxone and azithromycin>>Continue Cefdinir at discharge  Respiratory viral panel negative  Pulmonary recommendations appreciated  Cardiology recommendations appreciated-Plan for heart cath as troponin's trending up, family debating at this time. Patient refused  heart cath. Continue Aspirin  Coreg dose adjusted  Taper prednisone  Echocardiogram with EF 50% with Grade I DD     Lactic acidosis-Resolved  Likely with Hypoxia     Acquired hypothyroidism  Continue Synthroid     Paroxysmal atrial fibrillation  Continue Eliquis and Beta blocker     Coronary artery disease involving coronary bypass graft of native heart without angina pectoris  Aspirin, Lipitor     Chronic obstructive pulmonary disease, likely not in acute exacerbation      Prior cardiac cath as below:    Coronary anatomy:   The left main coronary artery has no significant disease. Left anterior descending artery has 60% mid stenosis. D1 has 99% mid stenosis     Circumflex artery has 90% instent stenosis going into a medium size OM. A large PL occluded filling from collaterls. .     The right coronary artery is a dominant vessel with  Patent stents. Left ventriculogram shows ejection fraction of 55%. Normal wall motion. LIMA patent     Impression:   SEVERE NATIVE CAD  DILATED AORTA  PAF          Patient Active Problem List   Diagnosis Code    CAD (coronary artery disease) I25.10    History of PTCA Z98.61    Hyperlipidemia E78.5    Hypertension I10    Obesity E66.9    VHD (valvular heart disease) I39    H/O cardiovascular stress test Z92.89    Chest pain R07.9    Paroxysmal atrial fibrillation (HCC) I48.0    UTI (urinary tract infection) N39.0      Recommendations:  CABG and MAZE  Findings are reviewed and discussed  with patient and family. Questions are answered. Post procedure activity restrictions and continued risk modification and follow up recommended. Bevin Councilman, MD, 1/17/2016 8:31 AM       Objective:        Intake/Output Summary (Last 24 hours) at 6/19/2021 0721  Last data filed at 6/18/2021 2330  Gross per 24 hour Intake 702.75 ml   Output 750 ml   Net -47.25 ml      Vitals:   Vitals:    06/19/21 0712   BP:    Pulse: 59   Resp:    Temp:    SpO2:          Labs:   CBC:   Lab Results   Component Value Date    WBC 10.2 06/19/2021    HGB 10.5 06/19/2021    HCT 33.7 06/19/2021    MCV 97.7 06/19/2021     06/19/2021     BMP:   Lab Results   Component Value Date     06/19/2021    K 4.4 06/19/2021    CL 97 06/19/2021    CO2 34 06/19/2021    PHOS 4.2 07/16/2016    BUN 41 06/19/2021    CREATININE 1.1 06/19/2021    CALCIUM 9.3 06/19/2021       Physical Exam:      Physical Exam  Cardiovascular:      Rate and Rhythm: Tachycardia present. Rhythm irregular. Pulmonary:      Effort: No respiratory distress. Breath sounds: Wheezing and rales present. Abdominal:      General: Abdomen is flat. Skin:     Coloration: Skin is not jaundiced. Neurological:      Mental Status: She is alert. Cranial Nerves: No cranial nerve deficit.            Medications:   Medications:    isosorbide mononitrate  30 mg Oral Daily    dilTIAZem  120 mg Oral Daily    carvedilol  12.5 mg Oral BID WC    ranolazine  500 mg Oral BID    insulin lispro  0-6 Units Subcutaneous TID WC    insulin lispro  0-3 Units Subcutaneous Nightly    guaiFENesin  600 mg Oral BID    sodium chloride flush  5-40 mL Intravenous 2 times per day    furosemide  40 mg Intravenous BID    apixaban  5 mg Oral BID    aspirin  81 mg Oral Daily    atorvastatin  10 mg Oral Daily    budesonide-formoterol  2 puff Inhalation BID    levothyroxine  25 mcg Oral Daily    methylPREDNISolone  40 mg Intravenous Q8H    pantoprazole  40 mg Oral QAM AC    piperacillin-tazobactam  3,375 mg Intravenous Once    ipratropium-albuterol  1 ampule Inhalation Q4H WA      Infusions:    dextrose      sodium chloride       PRN Meds: nitroGLYCERIN, 0.4 mg, Q5 Min PRN  glucose, 15 g, PRN  dextrose, 12.5 g, PRN  glucagon (rDNA), 1 mg, PRN  dextrose, 100 mL/hr, PRN  sodium chloride flush, 5-40 mL, PRN  sodium chloride, 25 mL, PRN  polyethylene glycol, 17 g, Daily PRN  acetaminophen, 650 mg, Q6H PRN   Or  acetaminophen, 650 mg, Q6H PRN  ondansetron, 4 mg, Q6H PRN          Electronically signed by Arvin Adair MD on 6/19/2021 at 7:21 AM

## 2021-06-19 NOTE — PROGRESS NOTES
Cardiology Progress Note     Today's Plan: start antianginal medications and switch to PO cardizem    Admit Date:  6/16/2021    Consult reason/ Seen today for: Shortness of breath    Subjective and  Overnight Events: patient states that she is feeling fairly well. Denies chest pain or shortness of breath. She and her daughter are agreeable to have hospice to see her while she is here too. Assessment / Plan / Recommendation:     1. Acute hypoxic/hypercapnic respiratory failure likely secondary to ? Pneumonia with elevated lactic acid    2. Acute decompensated diastolic congestive heart failure: EF 50% 5/14/2021. likely secondary to afib RVR and Pneumonia. Continue IV Lasix. BNP significantly more elevated today, ? inflammatory response with infectious process. Net I/O -1.7 L continue with Coreg. Strict intake output daily weights 2 gm sodium diet and 1500 ml fluid restriction. 3. History of coronary artery disease s/p CABG. Refused left heart cath last admission. Conservative management. Continue with aspirin beta-blocker and statins. She has been having chest pain. Will start antianginals, coreg, ranexa, imdur  4. Paroxysmal atrial fibrillation: Switch to PO cardizem this morning. mag 2.2, potassium 4.4 today  Recommend to keep potassium between 4-4.5 and magnesium between 2 and 2.2. Continue with beta-blocker/Eliquis  5. Moderate aortic regurgitation: Continue with diuretics.  IV Lasix 40 daily. 6. Hyperlipidemia: Continue with Lipitor  7. History of stroke: Continue aspirin statin  8. NAEEM?: patient refusing to wear BiPAP. 9. DVT prophylaxis if not contraindicated. 10. Patient is hospice at home and limited code, medications only, here in hospital. As she does not want aggressive treatment recommend palliative care/ hospice evaluation for her here as well. History of Presenting Illness:    Chief complain on admission : 80 y. o.year old who is admitted for  Chief Complaint   Patient presents with    Respiratory Distress        Past medical history:    has a past medical history of A-fib ([facility] [address] Bronchospasm, CHF (congestive heart failure) ([facility] [address] COPD (chronic obstructive pulmonary disease) ([facility] [address] Essential hypertension, Family history of coronary artery disease, GERD (gastroesophageal reflux disease), H/O cardiovascular stress test, H/O echocardiogram, Headache, History of PTCA, Hx of cardiovascular stress test, Hx of echocardiogram, Hyperlipidemia, Obesity, Paroxysmal atrial fibrillation ([facility] [address] Postmenopausal state, Restrictive lung disease, and VHD (valvular heart disease). Past surgical history:   has a past surgical history that includes Cholecystectomy; Coronary angioplasty (2005, 1995); Thoracentesis (Left, 08/15/2016); Cataract removal with implant (Bilateral, 2006); and Coronary artery bypass graft (07/2016). Social History:   reports that she has never smoked. She has never used smokeless tobacco. She reports that she does not drink alcohol and does not use drugs. Family history:  family history includes Colon Cancer in an other family member; Heart Disease in her father, sister, and sister; Heart Failure in her mother; Heart Surgery in her sister; Rheum Arthritis in her sister. Allergies   Allergen Reactions    Sulfa Antibiotics Other (See Comments)     Cannot remember; rash per paper chart    Zoloft [Sertraline Hcl] Other (See Comments)     Sherrell          Review of Systems:  Review of Systems   Respiratory: Positive for shortness of breath. Cardiovascular: Positive for chest pain. Negative for palpitations and leg swelling. Musculoskeletal: Negative. Skin: Negative. Neurological: Negative for dizziness and weakness. All other systems reviewed and are negative.        BP (!) 144/66   Pulse 75   Temp 97.9 °F (36.6 °C) (Oral)   Resp 22   Ht 4' 11\" (1.499 m)   Wt 169 lb 8.5 oz (76.9 kg)   SpO2 98%   BMI 34.24 kg/m²       Intake/Output Summary (Last 24 hours) at 6/19/2021 1409  Last data filed at 6/19/2021 0825  Gross per 24 hour   Intake 342.75 ml   Output 1400 ml   Net -1057.25 ml       Physical Exam  Vitals reviewed. Constitutional:       General: She is not in acute distress. Appearance: Normal appearance. She is obese. She is not ill-appearing. HENT:      Head: Atraumatic. Neck:      Vascular: No carotid bruit. Cardiovascular:      Rate and Rhythm: Normal rate and regular rhythm. Pulses: Normal pulses. Heart sounds: Normal heart sounds. No murmur heard. Pulmonary:      Effort: Pulmonary effort is normal. No respiratory distress. Breath sounds: Normal breath sounds. Musculoskeletal:         General: No swelling or deformity. Cervical back: Neck supple. No muscular tenderness. Neurological:      Mental Status: She is alert.            Medications:    isosorbide mononitrate  30 mg Oral Daily    dilTIAZem  120 mg Oral Daily    carvedilol  12.5 mg Oral BID WC    ranolazine  500 mg Oral BID    insulin lispro  0-6 Units Subcutaneous TID WC    insulin lispro  0-3 Units Subcutaneous Nightly    guaiFENesin  600 mg Oral BID    sodium chloride flush  5-40 mL Intravenous 2 times per day    furosemide  40 mg Intravenous BID    apixaban  5 mg Oral BID    aspirin  81 mg Oral Daily    atorvastatin  10 mg Oral Daily    budesonide-formoterol  2 puff Inhalation BID    levothyroxine  25 mcg Oral Daily    methylPREDNISolone  40 mg Intravenous Q8H    pantoprazole  40 mg Oral QAM AC    piperacillin-tazobactam  3,375 mg Intravenous Once    ipratropium-albuterol  1 ampule Inhalation Q4H WA      dextrose      sodium chloride       nitroGLYCERIN, glucose, dextrose, glucagon (rDNA), dextrose, sodium chloride flush, sodium chloride, polyethylene glycol, acetaminophen **OR** acetaminophen, ondansetron    Lab Data:  CBC:   Recent Labs     06/16/21  1930 06/18/21  0617 06/19/21  0354   WBC 8.3 11.8* 10.2   HGB 12.1* 10.4* 10.5*   HCT 40.1 33.1* 33.7*   .0* 99.1 97.7    264 268     BMP:   Recent Labs     06/16/21 1930 06/18/21  0617 06/19/21  0354   * 139 142   K 4.6 4.3 4.4   CL 93* 96* 97*   CO2 28 35* 34*   BUN 17 31* 41*   CREATININE 0.9 1.2* 1.1     PT/INR: No results for input(s): PROTIME, INR in the last 72 hours. BNP:    Recent Labs     06/16/21 1930 06/19/21  0354   PROBNP 6,760* 19,793*     TROPONIN:   Recent Labs     06/18/21  1218 06/19/21  0354 06/19/21  1001   TROPONINT 0.188* 0.257* 0.230*        ECHO : 5/14/21  Left ventricular systolic function is normal.   Ejection fraction is visually estimated at 50%. Moderate left ventricular hypertrophy. Grade I diastolic dysfunction. Dilated ascending aorta measuring 4.1cm. Moderate aortic regurgitation; PHT: 463 msec. No evidence of any pericardial effusion. Impression:  Active Problems:    Paroxysmal atrial fibrillation (HCC)    History of PTCA    Hyperlipidemia    VHD (valvular heart disease)    Diastolic CHF, acute on chronic (HCC)  Resolved Problems:    * No resolved hospital problems. *       All labs, medications and tests reviewed by myself, continue all other medications of all above medical condition listed as is except for changes mentioned above. Thank you   Please call with questions. Electronically signed by Shubham Cordova MD on 6/19/2021 at 2:09 PM   [facility]    I have seen ,spoken to  and examined this patient personally, independently of the nurse practitioner. I have reviewed the hospital care given to date and reviewed all pertinent labs and imaging. The plan was developed mutually at the time of the visit with the patient,  NP   and myself. I have spoken with patient, nursing staff and provided written and verbal instructions . The above note has been reviewed and I agree with the assessment, diagnosis, and treatment plan with changes made by me as follows     HPI:  I have reviewed the above HPI  And agree with above   Jun Khan is a 80 y. o.year old who and presents with had concerns including Respiratory Distress.   Chief Complaint   Patient presents with    Respiratory Distress     Please review addendum/changes made to note above   Interval history: she has no chest pain     Physical Exam:  General:  Awake, alert, NAD  Head:normal  Eye:normal  Neck:  No JVD   Chest:  Clear to auscultation, respiration easy  Cardiovascular:  S1 and S2 audible, No added heart sounds, No signs of ankle edema, or volume overload, No evidence of JVD, No crackles  Abdomen:   nontender  Extremities:  No  edema  Pulses; palpable  Neuro: grossly normal      MEDICAL DECISION MAKING;    I agree with the above plan, which was planned by myself and discussed with NP.  NSTEMI :She does not want cath or procedure  Afib: continue eliquis   CHF : lasix   Add ranexa and imdur for chest pain       Omega Wayne MD [facility] 06/19/21

## 2021-06-19 NOTE — PLAN OF CARE
Please schedule pt for fasting labs   Problem: Falls - Risk of:  Goal: Will remain free from falls  Description: Will remain free from falls  6/19/2021 0801 by Guy Kaur RN  Outcome: Ongoing  6/18/2021 1947 by Nathaniel Aceves RN  Outcome: Ongoing  Goal: Absence of physical injury  Description: Absence of physical injury  6/19/2021 0801 by Guy Kaur RN  Outcome: Ongoing  6/18/2021 1947 by Nathaniel Aceves RN  Outcome: Ongoing     Problem: Skin Integrity:  Goal: Will show no infection signs and symptoms  Description: Will show no infection signs and symptoms  6/19/2021 0801 by Guy Kaur RN  Outcome: Ongoing  6/18/2021 1947 by Nathaniel Aceves RN  Outcome: Ongoing  Goal: Absence of new skin breakdown  Description: Absence of new skin breakdown  6/19/2021 0801 by Guy Kaur RN  Outcome: Ongoing  6/18/2021 1947 by Nathaniel Aceves RN  Outcome: Ongoing     Problem: Breathing Pattern - Ineffective:  Goal: Ability to achieve and maintain a regular respiratory rate will improve  Description: Ability to achieve and maintain a regular respiratory rate will improve  6/19/2021 0801 by Guy Kaur RN  Outcome: Ongoing  6/18/2021 1947 by Nathaniel Aceves RN  Outcome: Ongoing     Problem: Pain:  Goal: Pain level will decrease  Description: Pain level will decrease  6/19/2021 0801 by Guy Kaur RN  Outcome: Ongoing  6/18/2021 1947 by Nathaniel Aceves RN  Outcome: Ongoing  Goal: Control of acute pain  Description: Control of acute pain  6/19/2021 0801 by Guy Kaur RN  Outcome: Ongoing  6/18/2021 1947 by Nathaniel Aceves RN  Outcome: Ongoing  Goal: Control of chronic pain  Description: Control of chronic pain  6/19/2021 0801 by Guy Kaur RN  Outcome: Ongoing  6/18/2021 1947 by Nathaniel Aceves RN  Outcome: Ongoing

## 2021-06-20 VITALS
DIASTOLIC BLOOD PRESSURE: 61 MMHG | SYSTOLIC BLOOD PRESSURE: 142 MMHG | WEIGHT: 174.16 LBS | HEIGHT: 59 IN | RESPIRATION RATE: 26 BRPM | OXYGEN SATURATION: 100 % | HEART RATE: 71 BPM | TEMPERATURE: 97.6 F | BODY MASS INDEX: 35.11 KG/M2

## 2021-06-20 LAB
ANION GAP SERPL CALCULATED.3IONS-SCNC: 9 MMOL/L (ref 4–16)
BASOPHILS ABSOLUTE: 0 K/CU MM
BASOPHILS RELATIVE PERCENT: 0 % (ref 0–1)
BUN BLDV-MCNC: 48 MG/DL (ref 6–23)
CALCIUM SERPL-MCNC: 9 MG/DL (ref 8.3–10.6)
CHLORIDE BLD-SCNC: 91 MMOL/L (ref 99–110)
CO2: 37 MMOL/L (ref 21–32)
CREAT SERPL-MCNC: 1.1 MG/DL (ref 0.6–1.1)
DIFFERENTIAL TYPE: ABNORMAL
EOSINOPHILS ABSOLUTE: 0 K/CU MM
EOSINOPHILS RELATIVE PERCENT: 0 % (ref 0–3)
GFR AFRICAN AMERICAN: 57 ML/MIN/1.73M2
GFR NON-AFRICAN AMERICAN: 47 ML/MIN/1.73M2
GLUCOSE BLD-MCNC: 111 MG/DL (ref 70–99)
GLUCOSE BLD-MCNC: 133 MG/DL (ref 70–99)
HCT VFR BLD CALC: 32.9 % (ref 37–47)
HEMOGLOBIN: 10.5 GM/DL (ref 12.5–16)
IMMATURE NEUTROPHIL %: 0.9 % (ref 0–0.43)
LYMPHOCYTES ABSOLUTE: 1.1 K/CU MM
LYMPHOCYTES RELATIVE PERCENT: 12.9 % (ref 24–44)
MAGNESIUM: 1.9 MG/DL (ref 1.8–2.4)
MCH RBC QN AUTO: 31.3 PG (ref 27–31)
MCHC RBC AUTO-ENTMCNC: 31.9 % (ref 32–36)
MCV RBC AUTO: 98.2 FL (ref 78–100)
MONOCYTES ABSOLUTE: 0.5 K/CU MM
MONOCYTES RELATIVE PERCENT: 5.5 % (ref 0–4)
NUCLEATED RBC %: 0 %
PDW BLD-RTO: 12.7 % (ref 11.7–14.9)
PLATELET # BLD: 257 K/CU MM (ref 140–440)
PMV BLD AUTO: 10.1 FL (ref 7.5–11.1)
POTASSIUM SERPL-SCNC: 4.1 MMOL/L (ref 3.5–5.1)
RBC # BLD: 3.35 M/CU MM (ref 4.2–5.4)
SEGMENTED NEUTROPHILS ABSOLUTE COUNT: 6.8 K/CU MM
SEGMENTED NEUTROPHILS RELATIVE PERCENT: 80.7 % (ref 36–66)
SODIUM BLD-SCNC: 137 MMOL/L (ref 135–145)
TOTAL IMMATURE NEUTOROPHIL: 0.08 K/CU MM
TOTAL NUCLEATED RBC: 0 K/CU MM
TROPONIN T: 0.29 NG/ML
WBC # BLD: 8.5 K/CU MM (ref 4–10.5)

## 2021-06-20 PROCEDURE — 6370000000 HC RX 637 (ALT 250 FOR IP): Performed by: INTERNAL MEDICINE

## 2021-06-20 PROCEDURE — 6370000000 HC RX 637 (ALT 250 FOR IP): Performed by: NURSE PRACTITIONER

## 2021-06-20 PROCEDURE — 94640 AIRWAY INHALATION TREATMENT: CPT

## 2021-06-20 PROCEDURE — 2580000003 HC RX 258: Performed by: INTERNAL MEDICINE

## 2021-06-20 PROCEDURE — 6360000002 HC RX W HCPCS: Performed by: INTERNAL MEDICINE

## 2021-06-20 PROCEDURE — 84484 ASSAY OF TROPONIN QUANT: CPT

## 2021-06-20 PROCEDURE — 82962 GLUCOSE BLOOD TEST: CPT

## 2021-06-20 PROCEDURE — 2700000000 HC OXYGEN THERAPY PER DAY

## 2021-06-20 PROCEDURE — 99233 SBSQ HOSP IP/OBS HIGH 50: CPT | Performed by: INTERNAL MEDICINE

## 2021-06-20 PROCEDURE — 36415 COLL VENOUS BLD VENIPUNCTURE: CPT

## 2021-06-20 PROCEDURE — 85025 COMPLETE CBC W/AUTO DIFF WBC: CPT

## 2021-06-20 PROCEDURE — 94660 CPAP INITIATION&MGMT: CPT

## 2021-06-20 PROCEDURE — APPSS45 APP SPLIT SHARED TIME 31-45 MINUTES: Performed by: NURSE PRACTITIONER

## 2021-06-20 PROCEDURE — 94761 N-INVAS EAR/PLS OXIMETRY MLT: CPT

## 2021-06-20 PROCEDURE — 80048 BASIC METABOLIC PNL TOTAL CA: CPT

## 2021-06-20 PROCEDURE — 83735 ASSAY OF MAGNESIUM: CPT

## 2021-06-20 RX ORDER — MAGNESIUM OXIDE 400 MG/1
200 TABLET ORAL DAILY
Status: DISCONTINUED | OUTPATIENT
Start: 2021-06-20 | End: 2021-06-20 | Stop reason: HOSPADM

## 2021-06-20 RX ORDER — CARVEDILOL 25 MG/1
25 TABLET ORAL 2 TIMES DAILY
Qty: 60 TABLET | Refills: 1 | Status: SHIPPED | OUTPATIENT
Start: 2021-06-20

## 2021-06-20 RX ORDER — CARVEDILOL 25 MG/1
25 TABLET ORAL 2 TIMES DAILY WITH MEALS
Status: DISCONTINUED | OUTPATIENT
Start: 2021-06-20 | End: 2021-06-20 | Stop reason: HOSPADM

## 2021-06-20 RX ORDER — MAGNESIUM OXIDE 400 MG/1
200 TABLET ORAL DAILY
Qty: 30 TABLET | Refills: 0 | Status: SHIPPED | OUTPATIENT
Start: 2021-06-20

## 2021-06-20 RX ORDER — ATORVASTATIN CALCIUM 10 MG/1
TABLET, FILM COATED ORAL
Qty: 90 TABLET | Refills: 1 | Status: SHIPPED | OUTPATIENT
Start: 2021-06-20

## 2021-06-20 RX ORDER — RANOLAZINE 500 MG/1
500 TABLET, EXTENDED RELEASE ORAL 2 TIMES DAILY
Qty: 60 TABLET | Refills: 1 | Status: SHIPPED | OUTPATIENT
Start: 2021-06-20

## 2021-06-20 RX ORDER — PREDNISONE 10 MG/1
TABLET ORAL
Qty: 20 TABLET | Refills: 0
Start: 2021-06-20

## 2021-06-20 RX ORDER — DILTIAZEM HYDROCHLORIDE 120 MG/1
120 CAPSULE, COATED, EXTENDED RELEASE ORAL DAILY
Qty: 30 CAPSULE | Refills: 1 | Status: SHIPPED | OUTPATIENT
Start: 2021-06-21

## 2021-06-20 RX ORDER — PREDNISONE 10 MG/1
10 TABLET ORAL DAILY
Qty: 10 TABLET | Refills: 0 | Status: SHIPPED | OUTPATIENT
Start: 2021-06-20 | End: 2021-06-20 | Stop reason: SDUPTHER

## 2021-06-20 RX ORDER — ISOSORBIDE MONONITRATE 30 MG/1
30 TABLET, EXTENDED RELEASE ORAL DAILY
Qty: 30 TABLET | Refills: 0 | Status: SHIPPED | OUTPATIENT
Start: 2021-06-21

## 2021-06-20 RX ORDER — GUAIFENESIN 600 MG/1
600 TABLET, EXTENDED RELEASE ORAL 2 TIMES DAILY
Qty: 20 TABLET | Refills: 0 | Status: SHIPPED | OUTPATIENT
Start: 2021-06-20

## 2021-06-20 RX ORDER — AZITHROMYCIN 500 MG/1
500 TABLET, FILM COATED ORAL DAILY
Qty: 1 PACKET | Refills: 0 | Status: SHIPPED | OUTPATIENT
Start: 2021-06-20 | End: 2021-06-22

## 2021-06-20 RX ORDER — FUROSEMIDE 20 MG/1
40 TABLET ORAL DAILY
Qty: 60 TABLET | Refills: 1 | Status: SHIPPED | OUTPATIENT
Start: 2021-06-20

## 2021-06-20 RX ORDER — BISACODYL 10 MG
10 SUPPOSITORY, RECTAL RECTAL ONCE
Status: COMPLETED | OUTPATIENT
Start: 2021-06-20 | End: 2021-06-20

## 2021-06-20 RX ADMIN — MAGNESIUM OXIDE 400 MG (241.3 MG MAGNESIUM) TABLET 200 MG: TABLET at 13:18

## 2021-06-20 RX ADMIN — IPRATROPIUM BROMIDE AND ALBUTEROL SULFATE 1 AMPULE: .5; 3 SOLUTION RESPIRATORY (INHALATION) at 08:00

## 2021-06-20 RX ADMIN — RANOLAZINE 500 MG: 500 TABLET, EXTENDED RELEASE ORAL at 08:28

## 2021-06-20 RX ADMIN — FUROSEMIDE 40 MG: 10 INJECTION, SOLUTION INTRAVENOUS at 08:31

## 2021-06-20 RX ADMIN — PANTOPRAZOLE SODIUM 40 MG: 40 TABLET, DELAYED RELEASE ORAL at 05:22

## 2021-06-20 RX ADMIN — ISOSORBIDE MONONITRATE 30 MG: 30 TABLET, EXTENDED RELEASE ORAL at 08:28

## 2021-06-20 RX ADMIN — ASPIRIN 81 MG CHEWABLE TABLET 81 MG: 81 TABLET CHEWABLE at 08:28

## 2021-06-20 RX ADMIN — IPRATROPIUM BROMIDE AND ALBUTEROL SULFATE 1 AMPULE: .5; 3 SOLUTION RESPIRATORY (INHALATION) at 11:51

## 2021-06-20 RX ADMIN — GUAIFENESIN 600 MG: 600 TABLET, EXTENDED RELEASE ORAL at 08:28

## 2021-06-20 RX ADMIN — SODIUM CHLORIDE, PRESERVATIVE FREE 10 ML: 5 INJECTION INTRAVENOUS at 08:31

## 2021-06-20 RX ADMIN — LEVOTHYROXINE SODIUM 25 MCG: 25 TABLET ORAL at 05:22

## 2021-06-20 RX ADMIN — BISACODYL 10 MG: 10 SUPPOSITORY RECTAL at 11:00

## 2021-06-20 RX ADMIN — DILTIAZEM HYDROCHLORIDE 120 MG: 120 CAPSULE, COATED, EXTENDED RELEASE ORAL at 08:28

## 2021-06-20 RX ADMIN — APIXABAN 5 MG: 5 TABLET, FILM COATED ORAL at 08:28

## 2021-06-20 RX ADMIN — BUDESONIDE AND FORMOTEROL FUMARATE DIHYDRATE 2 PUFF: 160; 4.5 AEROSOL RESPIRATORY (INHALATION) at 08:00

## 2021-06-20 RX ADMIN — ATORVASTATIN CALCIUM 10 MG: 10 TABLET, FILM COATED ORAL at 08:28

## 2021-06-20 RX ADMIN — METHYLPREDNISOLONE SODIUM SUCCINATE 40 MG: 40 INJECTION, POWDER, FOR SOLUTION INTRAMUSCULAR; INTRAVENOUS at 05:22

## 2021-06-20 RX ADMIN — CARVEDILOL 25 MG: 25 TABLET, FILM COATED ORAL at 08:28

## 2021-06-20 ASSESSMENT — ENCOUNTER SYMPTOMS: SHORTNESS OF BREATH: 1

## 2021-06-20 NOTE — PROGRESS NOTES
Discharge papers reviewed with pt and daughter at bedside and understanding was verbalized. Pt alert and at baseline mentation per daughter and discharged at this time.

## 2021-06-20 NOTE — DISCHARGE SUMMARY
Discharge Summary    Name:  Karyn Thomas /Age/Sex: 1933  (80 y.o. female)   MRN & CSN:  [id number] & [id number] Admission Date/Time: 2021  7:12 PM   Attending:  No att. providers found Discharging Physician: Sedalia Osler, MD     HPI:     Per H&P:  Karyn Thomas is a 80 y.o. female with coronary artery disease s/p CABG (7/15), chronic diastolic congestive heart failure, paroxysmal atrial fibrillation, on anticoagulation, CVA 4 years ago post cardiac cath (), ascending aortic aneurysm, chronic respiratory failure on home oxygen at 2 L/min hypertension, hyperlipidemia,, GERD, hypothyroidism, moderate obesity, recent admission to the hospital -21 for acute hypoxic and hypercapnic respiratory failure, currently under hospice as per patient's daughter at bedside presented to ED with complaints of shortness of breath since yesterday. Patient feeling short of breath, very tachypneic. Most of the information was obtained from patient's daughter at bedside. She denied patient having any fever, chills, but has chronic cough. Patient denied any abdominal pain, denied any urinary complaints, denied any constipation or diarrhea. Daughter reported compliance to medications. Patient has 24/7 care givers besides her daughter. As per EMS patient was saturating 60% on room air. Patient was placed on nonrebreather and transferred to ER. Patient was placed on BiPAP in ER. Vitals in ED-/94, , RR 29, temp 98.8, saturating 90% on BiPAP. Lab work significant for sodium 132,Lactic acid 2.8, glucose 228, WBC 8.3, hemoglobin 12.1, platelets 942. Rapid Covid negative. UA-not suggestive of infection. Chest x-ray-bilateral pulmonary opacities which are significantly worse than on the prior study. Consolidative density in the right lung base-this could represent edema or infiltrates. Pulmonary masses are difficult to entirely exclude.   Patient received Lasix 80 mg in ED, levofloxacin, Zosyn. On my evaluation patient was nauseated. Taken off of BiPAP. Was in respiratory distress, having abdominal breathing. Requiring 6 L of oxygen to maintain 94% oxygen saturation. Wheezing noted on examination. Stat VBG ordered. Zofran, IV methylprednisolone, DuoNeb ordered. On reevaluation nausea resolved, patient was resting but still appeared to be tachypneic, respiratory distress. Discussed with RT and place the patient back on BiPAP. Hospital Course:     Krystina Valderrama is a pleasant 80-year-old who presented to ED from home via EMS for respiratory distress. She was already under the care of [facility] hospice at home. She was admitted. She signed out of hospice. She has a known history of CHF. While here she was treated for acute CHF. She also has known COPD and despite resolution of her acute pulmonary edema with diuresis while here she remained in bronchospasm. She was treated for a COPD exacerbation while here as well. She was here in the hospital for 4 days. Today she was still wheezing on exam but was able to get up and walk to the bathroom with the assistance of a nurse and a walker. The nurse indicated that she did not have much dyspnea with ambulation. Her daughter wanted her to go back home and she was cleared from the cardiology standpoint, so she was discharged home in stable condition today. Her daughter wanted her to be reenrolled with hospice at the time of discharge, so in order for that was placed upon discharge.     Problem list    Acute on chronic respiratory failure with hypoxia and hypercapnia  -She needed BiPAP for a short while upon admission  -Upon discharge she was back to her baseline 2 L oxygen nasal cannula which she has at home  -BiPAP was ordered for her at night, however but she did not want to wear it    Acute on chronic diastolic heart failure  -Her home Lasix was increased from 20 to 40 mg daily    COPD exacerbation with chronic the patient spends a lot of time in bed per daughter and requires a lot of assistance with her activities of daily living    Hypertension  Hyperlipidemia  Obesity with BMI 34    Recent admission from May 13 May 16  -Per discharge summary she had acute hypoxic and hypercapnic respiratory failure possibly due to community-acquired pneumonia versus fluid overload versus flash pulmonary edema. She was also diagnosed with an NSTEMI at that time and declined to have a cardiac catheterization. She now presented about 31 days after the last discharge    The patient expressed appropriate understanding of and agreement with the discharge recommendations, medications, and plan.      Consults this admission:  IP CONSULT TO HOSPITALIST  IP CONSULT TO CARDIOLOGY  IP CONSULT TO IV TEAM  IP CONSULT TO [address] [place] [address]    Discharge Instruction:   Follow up appointments: Reenroll with hospice  Primary care physician:  within 2 weeks    Diet: Cardiac  Activity: As tolerated  Disposition: Discharged to:   Reenroll with home hospice  Condition on discharge: Stable    Discharge Medications:      [address] Medication Instructions FBQ:[id number]    Printed on:06/23/21 9139   Medication Information                      acetaminophen (TYLENOL) 500 MG tablet  Take 500 mg by mouth every 6 hours as needed for Pain             albuterol (PROVENTIL) (5 MG/ML) 0.5% nebulizer solution  Take 1 mL by nebulization 4 times daily as needed for Wheezing             albuterol (PROVENTIL;VENTOLIN) 90 MCG/ACT inhaler  Inhale 2 puffs into the lungs 4 times daily as needed              apixaban (ELIQUIS) 5 MG TABS tablet  TAKE 1 TABLET BY MOUTH TWICE A DAY             aspirin 81 MG tablet  Take 81 mg by mouth daily             atorvastatin (LIPITOR) 10 MG tablet  TAKE 1 TABLET BY MOUTH EVERY DAY             carvedilol (COREG) 25 MG tablet  Take 1 tablet by mouth 2 times daily TAKE 1 TABLET BY MOUTH TWICE A DAY             [employer] ESOMEPRAZOLE MAGNESIUM PO  Take 20 mg by mouth daily             dilTIAZem (CARDIZEM CD) 120 MG extended release capsule  Take 1 capsule by mouth daily             fluticasone-salmeterol (WIXELA INHUB) 250-50 MCG/DOSE AEPB  INHALE 1 PUFF INTO THE LUNGS EVERY 12 HOURS             furosemide (LASIX) 20 MG tablet  Take 2 tablets by mouth daily             guaiFENesin (MUCINEX) 600 MG extended release tablet  Take 1 tablet by mouth 2 times daily             isosorbide mononitrate (IMDUR) 30 MG extended release tablet  Take 1 tablet by mouth daily             levothyroxine (SYNTHROID) 25 MCG tablet  TAKE 1 TABLET BY MOUTH EVERY DAY             magnesium oxide (MAG-OX) 400 MG tablet  Take 0.5 tablets by mouth daily             multivitamin (THERAGRAN) per tablet  Take 1 tablet by mouth daily. nitroGLYCERIN (NITROSTAT) 0.4 MG SL tablet  Place 1 tablet under the tongue every 5 minutes as needed for Chest pain             predniSONE (DELTASONE) 10 MG tablet  Take 4 a day for 2 days, then  Take 3 a day for 2 days, then  Take 2 a day for 2 days, then  Take 1 a day for 2 days, then you have completed the prednisone course             ranolazine (RANEXA) 500 MG extended release tablet  Take 1 tablet by mouth 2 times daily                 Objective Findings at Discharge:   BP (!) 142/61   Pulse 71   Temp 97.6 °F (36.4 °C) (Oral)   Resp 26   Ht 4' 11\" (1.499 m)   Wt 174 lb 2.6 oz (79 kg)   SpO2 100%   BMI 35.18 kg/m²            PHYSICAL EXAM   GEN Awake female, sitting upright in bed in no apparent distress. Appears given age.     She had some wheezing on chest exam      LABS:    CBC:   Lab Results   Component Value Date    WBC 8.5 06/20/2021    HGB 10.5 06/20/2021    HCT 32.9 06/20/2021    MCV 98.2 06/20/2021     06/20/2021     BMP:   Lab Results   Component Value Date     06/20/2021    K 4.1 06/20/2021    CL 91 06/20/2021    CO2 37 06/20/2021    PHOS 4.2 07/16/2016    BUN 48 06/20/2021    CREATININE 1.1 06/20/2021 CALCIUM 9.0 06/20/2021        IMAGING:     CT CHEST WO CONTRAST [[id number]] Collected: 06/18/21 1715     Order Status: Completed Updated: 06/18/21 1727     Narrative:       EXAMINATION:   CT OF THE CHEST WITHOUT CONTRAST 6/18/2021 4:18 pm     TECHNIQUE:   CT of the chest was performed without the administration of intravenous   contrast. Multiplanar reformatted images are provided for review. Dose   modulation, iterative reconstruction, and/or weight based adjustment of the   mA/kV was utilized to reduce the radiation dose to as low as reasonably   achievable. COMPARISON:   CT chest 05/13/2021 05/15/2020 and recent chest x-rays of 06/16/2021 and   06/18/2021. HISTORY:   ORDERING SYSTEM PROVIDED HISTORY: Follow up for abnornaml CXR upon admission,   as suggested by the radiologist   TECHNOLOGIST PROVIDED HISTORY:   Reason for exam:->Follow up for abnornaml CXR upon admission, as suggested by   the radiologist   Reason for Exam: f/u for abnormal cxr upon admission as suggested by   radiologist   Acuity: Unknown   Type of Exam: Unknown     FINDINGS:   Mediastinum: Unchanged aneurysm of the ascending aorta measures approximately   5.1 cm in diameter. No mediastinal adenopathy. Moderate cardiomegaly,   unchanged. Three-vessel calcific coronary artery disease status post sternal   splitting procedure. Lungs/pleura: The parenchymal opacities evident on the chest x-ray 06/16/2021   have nearly completely resolved. Mild right basilar pleural and parenchymal   disease remains. No pneumothorax. Upper Abdomen: Images obtained through the upper abdomen are remarkable for   prior cholecystectomy. The pancreas is atrophic. Soft Tissues/Bones: T10 vertebral plana unchanged from 05/13/2021. Impression:       Rapid clearing of parenchymal opacities from the chest x-ray of 06/16/2021   most consistent with resolving pulmonary edema. Mild residual right basilar pleuroparenchymal disease.       XR exclude. CT could better evaluate lung parenchyma if indicated.           Discharge Time of 50 minutes    Electronically signed by Deric Huang MD on 6/23/2021 at 9:01 PM

## 2021-06-20 NOTE — PROGRESS NOTES
Blood Glucose 115. Jamila Wallace RN notified.     Electronically signed by Suly Rios on 6/19/21 at 8:51 PM EDT

## 2021-06-20 NOTE — PROGRESS NOTES
palliative care/ hospice evaluation for her here as well. History of Presenting Illness:    Chief complain on admission : 80 y. o.year old who is admitted for  Chief Complaint   Patient presents with    Respiratory Distress        Past medical history:    has a past medical history of A-fib ([facility] [address] Bronchospasm, CHF (congestive heart failure) ([facility] [address] COPD (chronic obstructive pulmonary disease) ([facility] [address] Essential hypertension, Family history of coronary artery disease, GERD (gastroesophageal reflux disease), H/O cardiovascular stress test, H/O echocardiogram, Headache, History of PTCA, Hx of cardiovascular stress test, Hx of echocardiogram, Hyperlipidemia, Obesity, Paroxysmal atrial fibrillation ([facility] [address] Postmenopausal state, Restrictive lung disease, and VHD (valvular heart disease). Past surgical history:   has a past surgical history that includes Cholecystectomy; Coronary angioplasty (2005, 1995); Thoracentesis (Left, 08/15/2016); Cataract removal with implant (Bilateral, 2006); and Coronary artery bypass graft (07/2016). Social History:   reports that she has never smoked. She has never used smokeless tobacco. She reports that she does not drink alcohol and does not use drugs. Family history:  family history includes Colon Cancer in an other family member; Heart Disease in her father, sister, and sister; Heart Failure in her mother; Heart Surgery in her sister; Rheum Arthritis in her sister. Allergies   Allergen Reactions    Sulfa Antibiotics Other (See Comments)     Cannot remember; rash per paper chart    Zoloft [Sertraline Hcl] Other (See Comments)     Sherrell          Review of Systems:  Review of Systems   Respiratory: Positive for shortness of breath. Cardiovascular: Positive for chest pain. Negative for palpitations and leg swelling. Musculoskeletal: Negative. Skin: Negative. Neurological: Negative for dizziness and weakness. All other systems reviewed and are negative.        BP (!) 150/64   Pulse 61   Temp 97.6 °F (36.4 °C) (Oral)   Resp 17   Ht 4' 11\" (1.499 m)   Wt 174 lb 2.6 oz (79 kg)   SpO2 (!) 86%   BMI 35.18 kg/m²       Intake/Output Summary (Last 24 hours) at 6/20/2021 1643  Last data filed at 6/20/2021 0327  Gross per 24 hour   Intake 490 ml   Output 2275 ml   Net -1785 ml       Physical Exam  Vitals reviewed. Constitutional:       General: She is not in acute distress. Appearance: Normal appearance. She is obese. She is not ill-appearing. HENT:      Head: Atraumatic. Neck:      Vascular: No carotid bruit. Cardiovascular:      Rate and Rhythm: Normal rate and regular rhythm. Pulses: Normal pulses. Heart sounds: Murmur heard. Pulmonary:      Effort: Pulmonary effort is normal. No respiratory distress. Breath sounds: Normal breath sounds. Comments: On 2 L nasal cannula  Musculoskeletal:         General: No deformity. Cervical back: Neck supple. No muscular tenderness. Right lower leg: Edema ( Trace) present. Left lower leg: Edema ( Trace) present. Neurological:      Mental Status: She is alert.            Medications:    isosorbide mononitrate  30 mg Oral Daily    dilTIAZem  120 mg Oral Daily    carvedilol  12.5 mg Oral BID WC    ranolazine  500 mg Oral BID    cefTRIAXone (ROCEPHIN) IV  1,000 mg Intravenous Q24H    azithromycin  500 mg Intravenous Q24H    insulin lispro  0-6 Units Subcutaneous TID WC    insulin lispro  0-3 Units Subcutaneous Nightly    guaiFENesin  600 mg Oral BID    sodium chloride flush  5-40 mL Intravenous 2 times per day    furosemide  40 mg Intravenous BID    apixaban  5 mg Oral BID    aspirin  81 mg Oral Daily    atorvastatin  10 mg Oral Daily    budesonide-formoterol  2 puff Inhalation BID    levothyroxine  25 mcg Oral Daily    methylPREDNISolone  40 mg Intravenous Q8H    pantoprazole  40 mg Oral QAM AC    piperacillin-tazobactam  3,375 mg Intravenous Once    ipratropium-albuterol  1 ampule Inhalation Q4H WA      dextrose      sodium chloride       nitroGLYCERIN, glucose, dextrose, glucagon (rDNA), dextrose, sodium chloride flush, sodium chloride, polyethylene glycol, acetaminophen **OR** acetaminophen, ondansetron    Lab Data:  CBC:   Recent Labs     06/18/21  0617 06/19/21  0354 06/20/21  0322   WBC 11.8* 10.2 8.5   HGB 10.4* 10.5* 10.5*   HCT 33.1* 33.7* 32.9*   MCV 99.1 97.7 98.2    268 257     BMP:   Recent Labs     06/18/21  0617 06/19/21  0354 06/20/21  0322    142 137   K 4.3 4.4 4.1   CL 96* 97* 91*   CO2 35* 34* 37*   BUN 31* 41* 48*   CREATININE 1.2* 1.1 1.1     PT/INR: No results for input(s): PROTIME, INR in the last 72 hours. BNP:    Recent Labs     06/19/21  0354   PROBNP 19,793*     TROPONIN:   Recent Labs     06/19/21  1001 06/19/21  2124 06/20/21  0322   TROPONINT 0.230* 0.307* 0.295*        ECHO : 5/14/21  Left ventricular systolic function is normal.   Ejection fraction is visually estimated at 50%. Moderate left ventricular hypertrophy. Grade I diastolic dysfunction. Dilated ascending aorta measuring 4.1cm. Moderate aortic regurgitation; PHT: 463 msec. No evidence of any pericardial effusion. Impression:  Active Problems:    Paroxysmal atrial fibrillation (HCC)    History of PTCA    Hyperlipidemia    VHD (valvular heart disease)    Hx of non-ST elevation myocardial infarction (NSTEMI)    Acute on chronic diastolic congestive heart failure (HCC)    Diastolic CHF, acute on chronic (HCC)  Resolved Problems:    * No resolved hospital problems. *       All labs, medications and tests reviewed by myself, continue all other medications of all above medical condition listed as is except for changes mentioned above. Thank you   Please call with questions.     Electronically signed by OSIRIS Stafford CNP on 6/20/2021 at 6:27 AM   CARDIOLOGY ATTENDING ADDENDUM    I have seen ,spoken to  and examined this patient personally, independently of the nurse practitioner. I have reviewed the hospital care given to date and reviewed all pertinent labs and imaging. The plan was developed mutually at the time of the visit with the patient,  NP   and myself. I have spoken with patient, nursing staff and provided written and verbal instructions . The above note has been reviewed and I agree with the assessment, diagnosis, and treatment plan with changes made by me as follows     HPI:  I have reviewed the above HPI  And agree with above   Giovanni Billingsley is a 80 y. o.year old who and presents with had concerns including Respiratory Distress. Chief Complaint   Patient presents with    Respiratory Distress     Please review addendum/changes made to note above   Interval history:     Physical Exam:  General:  Awake, alert, NAD  Head:normal  Eye:normal  Neck:  No JVD   Chest:  Clear to auscultation, respiration easy  Cardiovascular:  S1 and S2 audible, No added heart sounds, No signs of ankle edema, or volume overload, No evidence of JVD, No crackles  Abdomen:   nontender  Extremities:  No  edema  Pulses; palpable  Neuro: grossly normal      MEDICAL DECISION MAKING;    I agree with the above plan, which was planned by myself and discussed with NP.   Will sign off call with questions agree with hospice care at home       Marcelo Shankar MD [facility] 06/20/21

## 2021-06-20 NOTE — CARE COORDINATION
CM called [facility] and notified Earl Andrews of dc orders. TAMANNA faxed hospice order and PHI to [phone].   Electronically signed by ROSALIA Tomlinson on 6/20/2021 at 1:47 PM

## 2021-06-21 ENCOUNTER — CARE COORDINATION (OUTPATIENT)
Dept: CASE MANAGEMENT | Age: 86
End: 2021-06-21

## 2021-06-22 ENCOUNTER — CARE COORDINATION (OUTPATIENT)
Dept: CASE MANAGEMENT | Age: 86
End: 2021-06-22

## 2021-06-22 NOTE — CARE COORDINATION
[facility] Transitions Initial Follow Up Call    Call within 2 business days of discharge: Yes    Patient: Cassi Anguiano Patient : 1933   MRN: [id number]  Reason for Admission: A/C CHF, A/C Resp Failure  Discharge Date: 21 RARS: Readmission Risk Score: 22  Facility: [address] [place] [address]       Complaint Diagnosis Description Type Department Provider    21 Respiratory Distress Pneumonia of both lungs due to infectious organism, unspecified part of lung . .. ED to Hosp-Admission (Discharged) (ADMITTED) [id number] Ava Clark MD; Pantera Sweeney..      . BPCI MONITORING 21-8/15/21  DR Sepsis    T/C [facility]. Confirmed agency aware of 21 discharge and has home visit yesterday reopening case.        Donal Prince RN

## 2021-06-23 ENCOUNTER — TELEPHONE (OUTPATIENT)
Dept: FAMILY MEDICINE CLINIC | Age: 86
End: 2021-06-23

## 2021-07-23 ENCOUNTER — CARE COORDINATION (OUTPATIENT)
Dept: CASE MANAGEMENT | Age: 86
End: 2021-07-23

## 2021-07-23 NOTE — CARE COORDINATION
[facility] Transitions Follow Up Call    2021    Patient: Eslie Kaplan  Patient : 1933   MRN: <[id number]>  Reason for Admission:   Discharge Date: 21 RARS: Readmission Risk Score: 22    Attempted to reach pt for BPCI-A follow up call. Left message requesting call back. Care Transitions Subsequent and Final Call    Subsequent and Final Calls  Care Transitions Interventions  Other Interventions:            Follow Up  Future Appointments   Date Time Provider Pj Acosta   12/15/2021  3:30 PM Kobe Galvan, [address] AWV LPN [address] [place] [facility] GISELE Bertrand

## 2021-07-30 ENCOUNTER — CARE COORDINATION (OUTPATIENT)
Dept: CASE MANAGEMENT | Age: 86
End: 2021-07-30

## 2021-07-30 NOTE — CARE COORDINATION
[facility] Transitions Follow Up Call    2021    Patient: Maranda Gardner  Patient : 1933   MRN: <[id number]>  Reason for Admission:   Discharge Date: 21 RARS: Readmission Risk Score: 22       Sp[facility] with Gustavo at [facility]. [facility] reports patient  on 2021.         Judy Dickson RN

## 2024-05-08 NOTE — TELEPHONE ENCOUNTER
Left detailed message.  Gave verbal approval ESRD 2/2 to [facility] on iHD  C/b HTN emergency 2/2 missed iHD  Dialysis plan as below

## 2025-04-30 NOTE — PROGRESS NOTES
Appt canceled and sent to the ER
Detail Level: Detailed
Quality 226: Preventive Care And Screening: Tobacco Use: Screening And Cessation Intervention: Patient screened for tobacco use and is an ex/non-smoker